# Patient Record
Sex: MALE | Race: WHITE | NOT HISPANIC OR LATINO | Employment: OTHER | URBAN - METROPOLITAN AREA
[De-identification: names, ages, dates, MRNs, and addresses within clinical notes are randomized per-mention and may not be internally consistent; named-entity substitution may affect disease eponyms.]

---

## 2018-05-17 ENCOUNTER — PATIENT OUTREACH (OUTPATIENT)
Dept: ADMINISTRATIVE | Facility: HOSPITAL | Age: 73
End: 2018-05-17

## 2018-05-17 NOTE — LETTER
May 17, 2018    Jose Charles  [address]  [place] [address]             [facility]  [address]  [facility] [address]  Phone: [phone] May 17, 2018      Dear Ms. Soto,    I see you have an upcoming appointment with Angela Tejada MD .      Your Primary Care Provider will review the need for any lab testing and obtain or schedule during your appointment.  If you have completed any labs prior to this appointment, please bring a copy with you or let me know where I may request the records.    Your chart is indicating you may be due for the following:   Health Maintenance Due   Topic    Hepatitis C Screening     Lipid Panel     Colonoscopy     Zoster Vaccine      Were any of these test/procedures performed outside of [facility]?  If so, please let me know when and where so I may request those records and update your chart.      If you would like me to coordinate scheduling any of the recommended test or procedures around the time of your appointment, please let me know.     Thank you for choosing [facility] for your healthcare needs,  Pily MARIE LPN, Care Coordination Department  [facility]

## 2018-05-31 ENCOUNTER — OFFICE VISIT (OUTPATIENT)
Dept: FAMILY MEDICINE | Facility: CLINIC | Age: 73
End: 2018-05-31
Payer: MEDICARE

## 2018-05-31 VITALS
HEIGHT: 70 IN | RESPIRATION RATE: 18 BRPM | DIASTOLIC BLOOD PRESSURE: 70 MMHG | OXYGEN SATURATION: 95 % | WEIGHT: 159.63 LBS | SYSTOLIC BLOOD PRESSURE: 124 MMHG | BODY MASS INDEX: 22.85 KG/M2 | TEMPERATURE: 98 F | HEART RATE: 54 BPM

## 2018-05-31 DIAGNOSIS — R32 URINARY INCONTINENCE, UNSPECIFIED TYPE: ICD-10-CM

## 2018-05-31 DIAGNOSIS — I77.9 CAROTID ARTERY DISEASE, UNSPECIFIED LATERALITY: ICD-10-CM

## 2018-05-31 DIAGNOSIS — G31.84 MILD COGNITIVE IMPAIRMENT: ICD-10-CM

## 2018-05-31 DIAGNOSIS — E78.5 HYPERLIPIDEMIA, UNSPECIFIED HYPERLIPIDEMIA TYPE: ICD-10-CM

## 2018-05-31 PROCEDURE — 99204 OFFICE O/P NEW MOD 45 MIN: CPT | Mod: S$GLB,,, | Performed by: FAMILY MEDICINE

## 2018-05-31 PROCEDURE — 99999 PR PBB SHADOW E&M-EST. PATIENT-LVL III: CPT | Mod: PBBFAC,,, | Performed by: FAMILY MEDICINE

## 2018-05-31 RX ORDER — ROSUVASTATIN CALCIUM 5 MG/1
TABLET, COATED ORAL DAILY
COMMUNITY
Start: 2018-04-13 | End: 2018-10-09 | Stop reason: SDUPTHER

## 2018-05-31 RX ORDER — CEFDINIR 300 MG/1
CAPSULE ORAL
COMMUNITY
Start: 2018-05-30 | End: 2019-06-06 | Stop reason: ALTCHOICE

## 2018-05-31 RX ORDER — EZETIMIBE 10 MG/1
TABLET ORAL DAILY
COMMUNITY
Start: 2018-04-13 | End: 2019-06-06 | Stop reason: ALTCHOICE

## 2018-05-31 NOTE — PROGRESS NOTES
CHIEF COMPLAINT: This is a 72-year-old male here for first visit to establish care and for follow-up lab work.    SUBJECTIVE: The patient's PCP retired and he is now seeking a new primary care doctor.  He has a history of hyperlipidemia for which he takes Zetia 10 mg daily.  Crestor was added in February 2018.  Patient is being followed by urologist for urinary leakage after urination and ED.  He is to have urological procedure in the next 1-2 weeks.  Patient reports mild memory loss.  He has tinnitus of both ears, especially at night and a past history of shingles.  The patient exercises regularly by running every 2-3 days.  He has participated in 2 half marathons this year.  Patient is a vegetarian but still eats animal products such as eggs and cheese.  Patient drives, cooks and does his own finances.  He lives with his ailing girlfriend whom he cares for.      Medical records indicate a history of arrhythmia and mild carotid artery disease.    Eye exam May 2018.  Colonoscopy September 2012.  Tdap December 2017.  Patient refuses immunizations.      Past Medical History:   Diagnosis Date    Carotid artery disease     Mild    Dyshidrotic eczema     ED (erectile dysfunction)     History of cardiac arrhythmia     Hyperlipidemia     Mild cognitive impairment     Shingles     Tinnitus of both ears     Urinary incontinence        Past Surgical History:   Procedure Laterality Date    CATARACT EXTRACTION W/  INTRAOCULAR LENS IMPLANT Bilateral 08/2017    EYE SURGERY Bilateral 05/2018    Laser treatment     INGUINAL HERNIA REPAIR      TONSILLECTOMY         Social History     Social History    Marital status: Single     Spouse name: N/A    Number of children: N/A    Years of education: N/A     Social History Main Topics    Smoking status: Never Smoker    Smokeless tobacco: Never Used    Alcohol use 3.6 - 4.8 oz/week     6 - 8 Glasses of wine per week    Drug use: No    Sexual activity: Not Asked      Other Topics Concern    None     Social History Narrative    The patient is  and is now living with his girlfriend who is in ill health.  He has 3 adult children.  He is a retired CPA.        Family History   Problem Relation Age of Onset    Colon cancer Mother     Heart disease Father     No Known Problems Sister     No Known Problems Sister          ROS:  GENERAL: Patient denies fever, chills, night sweats.  Patient denies weight gain or loss. Patient denies anorexia, fatigue, weakness or swollen glands.  SKIN: Patient denies rash or hair loss.  HEENT: Patient denies sore throat, ear pain, hearing loss, nasal congestion, or runny nose. Patient denies visual disturbance, eye irritation or discharge.  Positive tinnitus.  LUNGS: Patient denies cough, wheeze or hemoptysis.  CARDIOVASCULAR: Patient denies chest pain, shortness of breath, palpitations, syncope or lower extremity edema.  GI: Patient denies abdominal pain, nausea, vomiting, diarrhea, constipation, blood in stool or melena.  GENITOURINARY: Patient denies irregular vaginal bleeding.  Patient denies dysuria, frequency, hematuria, nocturia, or urgency.  Positive for incontinence and erectile dysfunction.  MUSCULOSKELETAL: Patient denies joint pain, swelling, redness or warmth.  NEUROLOGIC: Patient denies headache, vertigo, paresthesias, weakness in limb, dysarthria, dysphagia or abnormality of gait.  PSYCHIATRIC: Patient denies anxiety, depression, or memory loss.    OBJECTIVE:   GENERAL:  Well-developed well-nourished pleasant light male alert and oriented x3, in no acute distress.  Mild cognitive impairment.   SKIN: Clear without rash.  Normal color and tone.  HEENT: Eyes: Clear conjunctivae.  No scleral icterus.  Pupils equal reactive to light and accommodation.  Ears: Clear canals. Clear TMs.  Nose: Without congestion.  Pharynx: Without injection or exudates.  NECK: Supple, normal range of motion.  No masses, nodes or enlarged thyroid.   No JVD.  Carotids 2+ and equal.  No bruits.  LUNGS: Clear to auscultation.  Normal respiratory effort.  CARDIOVASCULAR: Regular rhythm, normal S1, S2 without murmur, gallop or rub.  BACK: No CVA or spinal tenderness.  ABDOMEN: Normal appearance.  Active bowel sounds.  Soft, nontender without mass or organomegaly.  No rebound or guarding.  EXTREMITIES: Without cyanosis, clubbing or edema.  Distal pulses 2+ and equal.  Normal range of motion in all extremities.  No joint effusion, erythema or warmth.  NEUROLOGIC:   Cranial nerves II through XII without deficit.  Motor strength equal bilaterally.  Sensation normal to touch.  Deep tendon reflexes 2+ and equal.  Gait without abnormality.  No tremor.  Negative cerebellar signs.      ASSESSMENT:  1. Hyperlipidemia, unspecified hyperlipidemia type    2. Urinary incontinence, unspecified type    3. Mild cognitive impairment    4. Carotid artery disease, unspecified laterality      PLAN:   1.  Exercise regularly.  2.  Age-appropriate counseling.  3.  Check lipid panel, SGOT and SGPT.  4.  Refill medications after results reviewed.  5.  Return to clinic for preventive health exam in February 2019.  6.  Patient refuses immunizations.    This visit was 45 minutes, greater than 50% of which involved counseling.

## 2018-06-01 ENCOUNTER — LAB VISIT (OUTPATIENT)
Dept: LAB | Facility: HOSPITAL | Age: 73
End: 2018-06-01
Attending: FAMILY MEDICINE
Payer: MEDICARE

## 2018-06-01 DIAGNOSIS — E78.5 HYPERLIPIDEMIA, UNSPECIFIED HYPERLIPIDEMIA TYPE: ICD-10-CM

## 2018-06-01 LAB
ALT SERPL W/O P-5'-P-CCNC: 30 U/L
AST SERPL-CCNC: 28 U/L
CHOLEST SERPL-MCNC: 164 MG/DL
CHOLEST/HDLC SERPL: 2 {RATIO}
HDLC SERPL-MCNC: 83 MG/DL
HDLC SERPL: 50.6 %
LDLC SERPL CALC-MCNC: 73.4 MG/DL
NONHDLC SERPL-MCNC: 81 MG/DL
TRIGL SERPL-MCNC: 38 MG/DL

## 2018-06-01 PROCEDURE — 80061 LIPID PANEL: CPT

## 2018-06-01 PROCEDURE — 36415 COLL VENOUS BLD VENIPUNCTURE: CPT | Mod: PO

## 2018-06-01 PROCEDURE — 84460 ALANINE AMINO (ALT) (SGPT): CPT

## 2018-06-01 PROCEDURE — 84450 TRANSFERASE (AST) (SGOT): CPT

## 2018-06-21 RX ORDER — ACETAMINOPHEN 500 MG
1 TABLET ORAL DAILY
COMMUNITY

## 2018-06-22 RX ORDER — LANOLIN ALCOHOL/MO/W.PET/CERES
100 CREAM (GRAM) TOPICAL DAILY
COMMUNITY

## 2018-06-22 RX ORDER — MAGNESIUM 250 MG
1 TABLET ORAL DAILY
COMMUNITY
End: 2019-06-06 | Stop reason: ALTCHOICE

## 2018-06-22 RX ORDER — GUAIFENESIN AND PHENYLEPHRINE HCL 400; 10 MG/1; MG/1
1 TABLET ORAL DAILY
COMMUNITY
End: 2019-06-06 | Stop reason: ALTCHOICE

## 2018-10-09 RX ORDER — ROSUVASTATIN CALCIUM 5 MG/1
5 TABLET, COATED ORAL DAILY
Qty: 90 TABLET | Refills: 1 | Status: SHIPPED | OUTPATIENT
Start: 2018-10-09 | End: 2019-06-13 | Stop reason: SDUPTHER

## 2019-06-06 ENCOUNTER — OFFICE VISIT (OUTPATIENT)
Dept: FAMILY MEDICINE | Facility: CLINIC | Age: 74
End: 2019-06-06
Payer: MEDICARE

## 2019-06-06 VITALS
WEIGHT: 174.63 LBS | BODY MASS INDEX: 25 KG/M2 | HEIGHT: 70 IN | TEMPERATURE: 99 F | SYSTOLIC BLOOD PRESSURE: 118 MMHG | OXYGEN SATURATION: 98 % | DIASTOLIC BLOOD PRESSURE: 86 MMHG | HEART RATE: 56 BPM

## 2019-06-06 DIAGNOSIS — E78.5 HYPERLIPIDEMIA, UNSPECIFIED HYPERLIPIDEMIA TYPE: ICD-10-CM

## 2019-06-06 DIAGNOSIS — Z12.5 PROSTATE CANCER SCREENING: ICD-10-CM

## 2019-06-06 DIAGNOSIS — Z00.00 PREVENTATIVE HEALTH CARE: Primary | ICD-10-CM

## 2019-06-06 DIAGNOSIS — Z23 NEED FOR PNEUMOCOCCAL VACCINATION: ICD-10-CM

## 2019-06-06 DIAGNOSIS — I77.9 CAROTID ARTERY DISEASE, UNSPECIFIED LATERALITY, UNSPECIFIED TYPE: ICD-10-CM

## 2019-06-06 PROCEDURE — 99397 PER PM REEVAL EST PAT 65+ YR: CPT | Mod: 25,S$GLB,, | Performed by: FAMILY MEDICINE

## 2019-06-06 PROCEDURE — 99397 PR PREVENTIVE VISIT,EST,65 & OVER: ICD-10-PCS | Mod: 25,S$GLB,, | Performed by: FAMILY MEDICINE

## 2019-06-06 PROCEDURE — 90670 PCV13 VACCINE IM: CPT | Mod: S$GLB,,, | Performed by: FAMILY MEDICINE

## 2019-06-06 PROCEDURE — 99999 PR PBB SHADOW E&M-EST. PATIENT-LVL III: CPT | Mod: PBBFAC,,, | Performed by: FAMILY MEDICINE

## 2019-06-06 PROCEDURE — 99999 PR PBB SHADOW E&M-EST. PATIENT-LVL III: ICD-10-PCS | Mod: PBBFAC,,, | Performed by: FAMILY MEDICINE

## 2019-06-06 PROCEDURE — G0009 ADMIN PNEUMOCOCCAL VACCINE: HCPCS | Mod: S$GLB,,, | Performed by: FAMILY MEDICINE

## 2019-06-06 PROCEDURE — G0009 PNEUMOCOCCAL CONJUGATE VACCINE 13-VALENT LESS THAN 5YO & GREATER THAN: ICD-10-PCS | Mod: S$GLB,,, | Performed by: FAMILY MEDICINE

## 2019-06-06 PROCEDURE — 90670 PNEUMOCOCCAL CONJUGATE VACCINE 13-VALENT LESS THAN 5YO & GREATER THAN: ICD-10-PCS | Mod: S$GLB,,, | Performed by: FAMILY MEDICINE

## 2019-06-06 RX ORDER — ASCORBIC ACID 500 MG
500 TABLET ORAL DAILY
COMMUNITY

## 2019-06-06 NOTE — PROGRESS NOTES
CHIEF COMPLAINT:  This is a 73-year-old male here for preventive health exam.      SUBJECTIVE:  Patient is doing well without complaints except for fractured 3rd metatarsal on right foot.  He is being followed by Orthopedics.  He has a history of hyperlipidemia for which he was taking Crestor 5 mg daily up until 1 month ago.  Patient is concerned about side effects from statins.  He has a history of mild carotid artery disease and arrhythmia.  Patient has a history of mild memory loss.  He has tinnitus of both ears, especially at night and a past history of shingles.  The patient exercises by walking since he fractured his foot.  He usually runs for exercise.  Patient is a vegetarian but still eats animal products such as eggs and cheese.  Patient drives, cooks and does his own finances.  He lives with his ailing girlfriend whom he cares for.      ROS:  GENERAL: Patient denies fever, chills, night sweats.  Patient denies weight gain or loss. Patient denies anorexia, fatigue, weakness or swollen glands.  SKIN: Patient denies rash or hair loss.  HEENT: Patient denies sore throat, ear pain, hearing loss, nasal congestion, or runny nose. Patient denies visual disturbance, eye irritation or discharge.  Positive tinnitus.  LUNGS: Patient denies cough, wheeze or hemoptysis.  CARDIOVASCULAR: Patient denies chest pain, shortness of breath, palpitations, syncope or lower extremity edema.  GI: Patient denies abdominal pain, nausea, vomiting, diarrhea, constipation, blood in stool or melena.  GENITOURINARY: Patient denies irregular vaginal bleeding.  Patient denies dysuria, frequency, hematuria, nocturia, or urgency.  Positive for incontinence and erectile dysfunction.  MUSCULOSKELETAL: Patient denies joint pain, swelling, redness or warmth.  NEUROLOGIC: Patient denies headache, vertigo, paresthesias, weakness in limb, dysarthria, dysphagia or abnormality of gait.  PSYCHIATRIC: Patient denies anxiety, depression, or memory  loss.     OBJECTIVE:   GENERAL:  Well-developed well-nourished pleasant white male alert and oriented x3, in no acute distress.  Mild cognitive impairment.  Weight gain of 15 lb in the last year.  SKIN: Clear without rash.  Normal color and tone.  HEENT: Eyes: Clear conjunctivae.  No scleral icterus.  Pupils equal reactive to light and accommodation.  Ears: Clear canals. Clear TMs.  Nose: Without congestion.  Pharynx: Without injection or exudates.  NECK: Supple, normal range of motion.  No masses, nodes or enlarged thyroid.  No JVD.  Carotids 2+ and equal.  No bruits.  LUNGS: Clear to auscultation.  Normal respiratory effort.  CARDIOVASCULAR: Regular rhythm, normal S1, S2 without murmur, gallop or rub.  BACK: No CVA or spinal tenderness.  ABDOMEN: Normal appearance.  Active bowel sounds.  Soft, nontender without mass or organomegaly.  No rebound or guarding.  EXTREMITIES: Without cyanosis, clubbing or edema.  Distal pulses 2+ and equal.  Normal range of motion in all extremities.  No joint effusion, erythema or warmth.  NEUROLOGIC:   Cranial nerves II through XII without deficit.  Motor strength equal bilaterally.  Sensation normal to touch.  Deep tendon reflexes 2+ and equal.  Gait without abnormality.  No tremor.  Negative cerebellar signs.  tejal masses, tenderness or swelling.  Negative hernia.  TAMERA: Normal prostate, smooth, nontender.  No masses.  Anorectal exam: No masses, nontender. Heme negative stool x2.    ASSESSMENT:  1. Preventative health care    2. Hyperlipidemia, unspecified hyperlipidemia type    3. Carotid artery disease, unspecified laterality, unspecified type    4. Need for pneumococcal vaccination    5. Prostate cancer screening      PLAN:   1.  Exercise regularly.  2.  Age-appropriate counseling.  3.  Fasting lab.  4.  Consider restarting Crestor after results reviewed.  5.  Return to clinic annually.  6.  Pneumovax.    This note is generated with speech recognition software and is subject to  transcription error and sound alike phrases that may be missed by proofreading.

## 2019-06-06 NOTE — PROGRESS NOTES
Patient tolerated injection well. Waited in the clinic for 15 minutes. No reactions reported./VLW   communication

## 2019-06-07 ENCOUNTER — LAB VISIT (OUTPATIENT)
Dept: LAB | Facility: HOSPITAL | Age: 74
End: 2019-06-07
Attending: FAMILY MEDICINE
Payer: MEDICARE

## 2019-06-07 DIAGNOSIS — Z00.00 PREVENTATIVE HEALTH CARE: ICD-10-CM

## 2019-06-07 DIAGNOSIS — E78.5 HYPERLIPIDEMIA, UNSPECIFIED HYPERLIPIDEMIA TYPE: ICD-10-CM

## 2019-06-07 DIAGNOSIS — Z12.5 PROSTATE CANCER SCREENING: ICD-10-CM

## 2019-06-07 LAB
BASOPHILS # BLD AUTO: 0.03 K/UL (ref 0–0.2)
BASOPHILS NFR BLD: 0.5 % (ref 0–1.9)
DIFFERENTIAL METHOD: ABNORMAL
EOSINOPHIL # BLD AUTO: 0.5 K/UL (ref 0–0.5)
EOSINOPHIL NFR BLD: 7.7 % (ref 0–8)
ERYTHROCYTE [DISTWIDTH] IN BLOOD BY AUTOMATED COUNT: 14.7 % (ref 11.5–14.5)
HCT VFR BLD AUTO: 50.2 % (ref 40–54)
HGB BLD-MCNC: 15.8 G/DL (ref 14–18)
IMM GRANULOCYTES # BLD AUTO: 0.03 K/UL (ref 0–0.04)
IMM GRANULOCYTES NFR BLD AUTO: 0.5 % (ref 0–0.5)
LYMPHOCYTES # BLD AUTO: 1.6 K/UL (ref 1–4.8)
LYMPHOCYTES NFR BLD: 25.3 % (ref 18–48)
MCH RBC QN AUTO: 28.7 PG (ref 27–31)
MCHC RBC AUTO-ENTMCNC: 31.5 G/DL (ref 32–36)
MCV RBC AUTO: 91 FL (ref 82–98)
MONOCYTES # BLD AUTO: 0.8 K/UL (ref 0.3–1)
MONOCYTES NFR BLD: 11.9 % (ref 4–15)
NEUTROPHILS # BLD AUTO: 3.5 K/UL (ref 1.8–7.7)
NEUTROPHILS NFR BLD: 54.1 % (ref 38–73)
NRBC BLD-RTO: 0 /100 WBC
PLATELET # BLD AUTO: 219 K/UL (ref 150–350)
PMV BLD AUTO: 10.5 FL (ref 9.2–12.9)
RBC # BLD AUTO: 5.51 M/UL (ref 4.6–6.2)
WBC # BLD AUTO: 6.37 K/UL (ref 3.9–12.7)

## 2019-06-07 PROCEDURE — 84153 ASSAY OF PSA TOTAL: CPT

## 2019-06-07 PROCEDURE — 85025 COMPLETE CBC W/AUTO DIFF WBC: CPT

## 2019-06-07 PROCEDURE — 36415 COLL VENOUS BLD VENIPUNCTURE: CPT | Mod: PO

## 2019-06-07 PROCEDURE — 80053 COMPREHEN METABOLIC PANEL: CPT

## 2019-06-07 PROCEDURE — 80061 LIPID PANEL: CPT

## 2019-06-07 PROCEDURE — 84443 ASSAY THYROID STIM HORMONE: CPT

## 2019-06-07 PROCEDURE — 86803 HEPATITIS C AB TEST: CPT

## 2019-06-08 LAB
ALBUMIN SERPL BCP-MCNC: 4 G/DL (ref 3.5–5.2)
ALP SERPL-CCNC: 62 U/L (ref 55–135)
ALT SERPL W/O P-5'-P-CCNC: 20 U/L (ref 10–44)
ANION GAP SERPL CALC-SCNC: 8 MMOL/L (ref 8–16)
AST SERPL-CCNC: 24 U/L (ref 10–40)
BILIRUB SERPL-MCNC: 1 MG/DL (ref 0.1–1)
BUN SERPL-MCNC: 9 MG/DL (ref 8–23)
CALCIUM SERPL-MCNC: 10.2 MG/DL (ref 8.7–10.5)
CHLORIDE SERPL-SCNC: 104 MMOL/L (ref 95–110)
CHOLEST SERPL-MCNC: 275 MG/DL (ref 120–199)
CHOLEST/HDLC SERPL: 3.6 {RATIO} (ref 2–5)
CO2 SERPL-SCNC: 28 MMOL/L (ref 23–29)
COMPLEXED PSA SERPL-MCNC: 2 NG/ML (ref 0–4)
CREAT SERPL-MCNC: 1 MG/DL (ref 0.5–1.4)
EST. GFR  (AFRICAN AMERICAN): >60 ML/MIN/1.73 M^2
EST. GFR  (NON AFRICAN AMERICAN): >60 ML/MIN/1.73 M^2
GLUCOSE SERPL-MCNC: 85 MG/DL (ref 70–110)
HDLC SERPL-MCNC: 77 MG/DL (ref 40–75)
HDLC SERPL: 28 % (ref 20–50)
LDLC SERPL CALC-MCNC: 182 MG/DL (ref 63–159)
NONHDLC SERPL-MCNC: 198 MG/DL
POTASSIUM SERPL-SCNC: 4.4 MMOL/L (ref 3.5–5.1)
PROT SERPL-MCNC: 7.1 G/DL (ref 6–8.4)
SODIUM SERPL-SCNC: 140 MMOL/L (ref 136–145)
TRIGL SERPL-MCNC: 80 MG/DL (ref 30–150)
TSH SERPL DL<=0.005 MIU/L-ACNC: 1.58 UIU/ML (ref 0.4–4)

## 2019-06-10 LAB — HCV AB SERPL QL IA: NEGATIVE

## 2019-06-13 RX ORDER — ROSUVASTATIN CALCIUM 5 MG/1
TABLET, COATED ORAL
Qty: 90 TABLET | Refills: 3 | Status: SHIPPED | OUTPATIENT
Start: 2019-06-13 | End: 2020-04-19

## 2019-06-14 ENCOUNTER — TELEPHONE (OUTPATIENT)
Dept: FAMILY MEDICINE | Facility: CLINIC | Age: 74
End: 2019-06-14

## 2019-06-14 NOTE — TELEPHONE ENCOUNTER
----- Message from Surekha Richey sent at 6/14/2019 11:59 AM CDT -----  Contact: self  needs call back regarding status of crestor script, [facility] was supposed to have sent...[phone] (home)

## 2020-04-19 RX ORDER — ROSUVASTATIN CALCIUM 5 MG/1
TABLET, COATED ORAL
Qty: 90 TABLET | Refills: 0 | Status: SHIPPED | OUTPATIENT
Start: 2020-04-19 | End: 2020-08-04 | Stop reason: SDUPTHER

## 2020-08-04 ENCOUNTER — OFFICE VISIT (OUTPATIENT)
Dept: FAMILY MEDICINE | Facility: CLINIC | Age: 75
End: 2020-08-04
Payer: MEDICARE

## 2020-08-04 VITALS
WEIGHT: 173.06 LBS | HEART RATE: 63 BPM | DIASTOLIC BLOOD PRESSURE: 74 MMHG | OXYGEN SATURATION: 96 % | HEIGHT: 70 IN | BODY MASS INDEX: 24.78 KG/M2 | SYSTOLIC BLOOD PRESSURE: 118 MMHG | TEMPERATURE: 99 F

## 2020-08-04 DIAGNOSIS — E78.5 HYPERLIPIDEMIA, UNSPECIFIED HYPERLIPIDEMIA TYPE: ICD-10-CM

## 2020-08-04 DIAGNOSIS — N52.9 ERECTILE DYSFUNCTION, UNSPECIFIED ERECTILE DYSFUNCTION TYPE: ICD-10-CM

## 2020-08-04 DIAGNOSIS — Z23 NEED FOR VACCINATION: ICD-10-CM

## 2020-08-04 DIAGNOSIS — Z00.00 PREVENTATIVE HEALTH CARE: Primary | ICD-10-CM

## 2020-08-04 DIAGNOSIS — I77.9 CAROTID ARTERY DISEASE, UNSPECIFIED LATERALITY, UNSPECIFIED TYPE: ICD-10-CM

## 2020-08-04 PROCEDURE — 99397 PR PREVENTIVE VISIT,EST,65 & OVER: ICD-10-PCS | Mod: 25,S$GLB,, | Performed by: FAMILY MEDICINE

## 2020-08-04 PROCEDURE — 99999 PR PBB SHADOW E&M-EST. PATIENT-LVL IV: ICD-10-PCS | Mod: PBBFAC,,, | Performed by: FAMILY MEDICINE

## 2020-08-04 PROCEDURE — 90732 PNEUMOCOCCAL POLYSACCHARIDE VACCINE 23-VALENT =>2YO SQ IM: ICD-10-PCS | Mod: S$GLB,,, | Performed by: FAMILY MEDICINE

## 2020-08-04 PROCEDURE — 90732 PPSV23 VACC 2 YRS+ SUBQ/IM: CPT | Mod: S$GLB,,, | Performed by: FAMILY MEDICINE

## 2020-08-04 PROCEDURE — G0009 PNEUMOCOCCAL POLYSACCHARIDE VACCINE 23-VALENT =>2YO SQ IM: ICD-10-PCS | Mod: S$GLB,,, | Performed by: FAMILY MEDICINE

## 2020-08-04 PROCEDURE — 99397 PER PM REEVAL EST PAT 65+ YR: CPT | Mod: 25,S$GLB,, | Performed by: FAMILY MEDICINE

## 2020-08-04 PROCEDURE — G0009 ADMIN PNEUMOCOCCAL VACCINE: HCPCS | Mod: S$GLB,,, | Performed by: FAMILY MEDICINE

## 2020-08-04 PROCEDURE — 99999 PR PBB SHADOW E&M-EST. PATIENT-LVL IV: CPT | Mod: PBBFAC,,, | Performed by: FAMILY MEDICINE

## 2020-08-04 RX ORDER — ROSUVASTATIN CALCIUM 5 MG/1
5 TABLET, COATED ORAL DAILY
Qty: 90 TABLET | Refills: 3 | Status: SHIPPED | OUTPATIENT
Start: 2020-08-04 | End: 2021-08-18

## 2020-08-04 RX ORDER — TADALAFIL 5 MG/1
5 TABLET ORAL DAILY PRN
COMMUNITY
End: 2022-04-14

## 2020-08-04 RX ORDER — ASPIRIN 81 MG/1
81 TABLET ORAL
COMMUNITY

## 2020-08-04 NOTE — PROGRESS NOTES
CHIEF COMPLAINT:  This is a 74-year-old male here for preventive health exam.       SUBJECTIVE:  Patient is doing well without complaints.  He has a history of hyperlipidemia for which he was taking Crestor 5 mg daily up until 1 month ago.  Patient is concerned about side effects from statins.  He has a history of mild carotid artery disease and arrhythmia. The patient sees his urologist twice yearly and recently received a prescription for Cialis 5 mg daily for ED.  He has tinnitus of both ears, especially at night and a past history of shingles.  The patient exercises by running every 2-3 days and doing yoga.  Patient is a vegetarian but still eats animal products such as eggs and cheese.  Patient drives, cooks and does his own finances.  He lives with his ailing girlfriend whom he cares for.      Eye exam 2020.  Colonoscopy September 2012 due in September 2022.  Tdap December 2017.  Prevnar June 2019.     ROS:  GENERAL: Patient denies fever, chills, night sweats.  Patient denies weight gain or loss. Patient denies anorexia, fatigue, weakness or swollen glands.  SKIN: Patient denies rash or hair loss.  HEENT: Patient denies sore throat, ear pain, hearing loss, nasal congestion, or runny nose. Patient denies visual disturbance, eye irritation or discharge.  Positive tinnitus.  LUNGS: Patient denies cough, wheeze or hemoptysis.  CARDIOVASCULAR: Patient denies chest pain, shortness of breath, palpitations, syncope or lower extremity edema.  GI: Patient denies abdominal pain, nausea, vomiting, diarrhea, constipation, blood in stool or melena.  GENITOURINARY: Patient denies irregular vaginal bleeding.  Patient denies dysuria, frequency, hematuria, nocturia, or urgency.  Positive for incontinence and erectile dysfunction.  MUSCULOSKELETAL: Patient denies joint pain, swelling, redness or warmth.  NEUROLOGIC: Patient denies headache, vertigo, paresthesias, weakness in limb, dysarthria, dysphagia or abnormality of  gait.  PSYCHIATRIC: Patient denies anxiety, depression, or memory loss.     OBJECTIVE:   GENERAL:  Well-developed well-nourished pleasant white male alert and oriented x3, in no acute distress.  Mild cognitive impairment.  Weight gain of 15 lb in the last year.  SKIN: Clear without rash.  Normal color and tone.  HEENT: Eyes: Clear conjunctivae.  No scleral icterus.  Pupils equal reactive to light and accommodation.  Ears: Clear canals. Clear TMs.  Nose: Without congestion.  Pharynx: Without injection or exudates.  NECK: Supple, normal range of motion.  No masses, nodes or enlarged thyroid.  No JVD.  Carotids 2+ and equal.  No bruits.  LUNGS: Clear to auscultation.  Normal respiratory effort.  CARDIOVASCULAR: Regular rhythm, normal S1, S2 without murmur, gallop or rub.  BACK: No CVA or spinal tenderness.  ABDOMEN: Normal appearance.  Active bowel sounds.  Soft, nontender without mass or organomegaly.  No rebound or guarding.  EXTREMITIES: Without cyanosis, clubbing or edema.  Distal pulses 2+ and equal.  Normal range of motion in all extremities.  No joint effusion, erythema or warmth.  NEUROLOGIC:   Cranial nerves II through XII without deficit.  Motor strength equal bilaterally.  Sensation normal to touch.  Deep tendon reflexes 2+ and equal.  Gait without abnormality.  No tremor.  Negative cerebellar signs.  tejal masses, tenderness or swelling.  Negative hernia.  TAMERA:  Deferred to urologist.     ASSESSMENT:  1. Preventative health care    2. Hyperlipidemia, unspecified hyperlipidemia type    3. Carotid artery disease, unspecified laterality, unspecified type    4. Erectile dysfunction, unspecified erectile dysfunction type    5. Need for vaccination        PLAN:  1.  Weight reduction. Exercise regularly.  2.  Age-appropriate counseling.  3.  Fasting lab.  4.  Refill Crestor 5 mg daily.  5.  Pneumovax.  6.  Follow up annually.    This note is generated with speech recognition software and is subject to transcription  error and sound alike phrases that may be missed by proofreading.

## 2020-08-05 ENCOUNTER — LAB VISIT (OUTPATIENT)
Dept: LAB | Facility: HOSPITAL | Age: 75
End: 2020-08-05
Attending: FAMILY MEDICINE
Payer: MEDICARE

## 2020-08-05 DIAGNOSIS — E78.5 HYPERLIPIDEMIA, UNSPECIFIED HYPERLIPIDEMIA TYPE: ICD-10-CM

## 2020-08-05 LAB
ALBUMIN SERPL BCP-MCNC: 3.9 G/DL (ref 3.5–5.2)
ALP SERPL-CCNC: 65 U/L (ref 55–135)
ALT SERPL W/O P-5'-P-CCNC: 27 U/L (ref 10–44)
ANION GAP SERPL CALC-SCNC: 8 MMOL/L (ref 8–16)
AST SERPL-CCNC: 30 U/L (ref 10–40)
BILIRUB SERPL-MCNC: 0.8 MG/DL (ref 0.1–1)
BUN SERPL-MCNC: 9 MG/DL (ref 8–23)
CALCIUM SERPL-MCNC: 9.4 MG/DL (ref 8.7–10.5)
CHLORIDE SERPL-SCNC: 104 MMOL/L (ref 95–110)
CHOLEST SERPL-MCNC: 182 MG/DL (ref 120–199)
CHOLEST/HDLC SERPL: 2.6 {RATIO} (ref 2–5)
CO2 SERPL-SCNC: 28 MMOL/L (ref 23–29)
CREAT SERPL-MCNC: 1 MG/DL (ref 0.5–1.4)
EST. GFR  (AFRICAN AMERICAN): >60 ML/MIN/1.73 M^2
EST. GFR  (NON AFRICAN AMERICAN): >60 ML/MIN/1.73 M^2
GLUCOSE SERPL-MCNC: 78 MG/DL (ref 70–110)
HDLC SERPL-MCNC: 69 MG/DL (ref 40–75)
HDLC SERPL: 37.9 % (ref 20–50)
LDLC SERPL CALC-MCNC: 99 MG/DL (ref 63–159)
NONHDLC SERPL-MCNC: 113 MG/DL
POTASSIUM SERPL-SCNC: 4.8 MMOL/L (ref 3.5–5.1)
PROT SERPL-MCNC: 7.3 G/DL (ref 6–8.4)
SODIUM SERPL-SCNC: 140 MMOL/L (ref 136–145)
TRIGL SERPL-MCNC: 70 MG/DL (ref 30–150)
TSH SERPL DL<=0.005 MIU/L-ACNC: 1.1 UIU/ML (ref 0.4–4)

## 2020-08-05 PROCEDURE — 85025 COMPLETE CBC W/AUTO DIFF WBC: CPT

## 2020-08-05 PROCEDURE — 80053 COMPREHEN METABOLIC PANEL: CPT

## 2020-08-05 PROCEDURE — 36415 COLL VENOUS BLD VENIPUNCTURE: CPT | Mod: PO

## 2020-08-05 PROCEDURE — 80061 LIPID PANEL: CPT

## 2020-08-05 PROCEDURE — 84443 ASSAY THYROID STIM HORMONE: CPT

## 2020-08-06 LAB
BASOPHILS # BLD AUTO: 0.03 K/UL (ref 0–0.2)
BASOPHILS NFR BLD: 0.4 % (ref 0–1.9)
DIFFERENTIAL METHOD: ABNORMAL
EOSINOPHIL # BLD AUTO: 0.3 K/UL (ref 0–0.5)
EOSINOPHIL NFR BLD: 3.8 % (ref 0–8)
ERYTHROCYTE [DISTWIDTH] IN BLOOD BY AUTOMATED COUNT: 14.3 % (ref 11.5–14.5)
HCT VFR BLD AUTO: 48.6 % (ref 40–54)
HGB BLD-MCNC: 15.2 G/DL (ref 14–18)
IMM GRANULOCYTES # BLD AUTO: 0.02 K/UL (ref 0–0.04)
IMM GRANULOCYTES NFR BLD AUTO: 0.2 % (ref 0–0.5)
LYMPHOCYTES # BLD AUTO: 2 K/UL (ref 1–4.8)
LYMPHOCYTES NFR BLD: 24.3 % (ref 18–48)
MCH RBC QN AUTO: 28.8 PG (ref 27–31)
MCHC RBC AUTO-ENTMCNC: 31.3 G/DL (ref 32–36)
MCV RBC AUTO: 92 FL (ref 82–98)
MONOCYTES # BLD AUTO: 0.8 K/UL (ref 0.3–1)
MONOCYTES NFR BLD: 10.4 % (ref 4–15)
NEUTROPHILS # BLD AUTO: 4.9 K/UL (ref 1.8–7.7)
NEUTROPHILS NFR BLD: 60.9 % (ref 38–73)
NRBC BLD-RTO: 0 /100 WBC
PLATELET # BLD AUTO: 250 K/UL (ref 150–350)
PMV BLD AUTO: 10.6 FL (ref 9.2–12.9)
RBC # BLD AUTO: 5.28 M/UL (ref 4.6–6.2)
WBC # BLD AUTO: 8.08 K/UL (ref 3.9–12.7)

## 2020-08-07 ENCOUNTER — TELEPHONE (OUTPATIENT)
Dept: FAMILY MEDICINE | Facility: CLINIC | Age: 75
End: 2020-08-07

## 2020-08-07 NOTE — TELEPHONE ENCOUNTER
----- Message from Sohail Fabian sent at 8/7/2020  1:38 PM CDT -----  Regarding: self  Type: Patient Call Back    Who called: Self    What is the request in detail: please contact the patient about the clinic staff    Can the clinic reply by [facility]? no    Would the patient rather a call back or a response via [facility]? call    Best call back number: [phone]

## 2020-08-07 NOTE — TELEPHONE ENCOUNTER
Spoke with pt, let him know the staff members name as requested. Pt wanting to send a thank you letter for them.

## 2021-03-05 ENCOUNTER — TELEPHONE (OUTPATIENT)
Dept: FAMILY MEDICINE | Facility: CLINIC | Age: 76
End: 2021-03-05

## 2021-05-26 RX ORDER — TRAZODONE HYDROCHLORIDE 100 MG/1
100 TABLET ORAL NIGHTLY
Qty: 30 TABLET | Refills: 2 | Status: SHIPPED | OUTPATIENT
Start: 2021-05-26 | End: 2021-07-02

## 2021-08-18 RX ORDER — ROSUVASTATIN CALCIUM 5 MG/1
5 TABLET, COATED ORAL DAILY
Qty: 90 TABLET | Refills: 0 | Status: SHIPPED | OUTPATIENT
Start: 2021-08-18 | End: 2021-10-25

## 2021-08-27 RX ORDER — TRAZODONE HYDROCHLORIDE 100 MG/1
100 TABLET ORAL NIGHTLY
Qty: 90 TABLET | Refills: 0 | Status: SHIPPED | OUTPATIENT
Start: 2021-08-27 | End: 2021-11-05

## 2021-09-09 ENCOUNTER — OFFICE VISIT (OUTPATIENT)
Dept: FAMILY MEDICINE | Facility: CLINIC | Age: 76
End: 2021-09-09
Payer: MEDICARE

## 2021-09-09 ENCOUNTER — LAB VISIT (OUTPATIENT)
Dept: LAB | Facility: HOSPITAL | Age: 76
End: 2021-09-09
Payer: MEDICARE

## 2021-09-09 VITALS
HEIGHT: 70 IN | DIASTOLIC BLOOD PRESSURE: 76 MMHG | TEMPERATURE: 98 F | BODY MASS INDEX: 23.68 KG/M2 | RESPIRATION RATE: 18 BRPM | SYSTOLIC BLOOD PRESSURE: 139 MMHG | HEART RATE: 60 BPM | OXYGEN SATURATION: 98 % | WEIGHT: 165.38 LBS

## 2021-09-09 DIAGNOSIS — Z00.00 PREVENTATIVE HEALTH CARE: ICD-10-CM

## 2021-09-09 DIAGNOSIS — E78.5 HYPERLIPIDEMIA, UNSPECIFIED HYPERLIPIDEMIA TYPE: ICD-10-CM

## 2021-09-09 DIAGNOSIS — I77.9 CAROTID ARTERY DISEASE, UNSPECIFIED LATERALITY, UNSPECIFIED TYPE: ICD-10-CM

## 2021-09-09 DIAGNOSIS — R32 URINARY INCONTINENCE, UNSPECIFIED TYPE: ICD-10-CM

## 2021-09-09 LAB
ALBUMIN SERPL BCP-MCNC: 4 G/DL (ref 3.5–5.2)
ALP SERPL-CCNC: 67 U/L (ref 55–135)
ALT SERPL W/O P-5'-P-CCNC: 32 U/L (ref 10–44)
ANION GAP SERPL CALC-SCNC: 11 MMOL/L (ref 8–16)
AST SERPL-CCNC: 29 U/L (ref 10–40)
BASOPHILS # BLD AUTO: 0.02 K/UL (ref 0–0.2)
BASOPHILS NFR BLD: 0.3 % (ref 0–1.9)
BILIRUB SERPL-MCNC: 1.2 MG/DL (ref 0.1–1)
BUN SERPL-MCNC: 13 MG/DL (ref 8–23)
CALCIUM SERPL-MCNC: 9.7 MG/DL (ref 8.7–10.5)
CHLORIDE SERPL-SCNC: 102 MMOL/L (ref 95–110)
CHOLEST SERPL-MCNC: 198 MG/DL (ref 120–199)
CHOLEST/HDLC SERPL: 2.5 {RATIO} (ref 2–5)
CO2 SERPL-SCNC: 26 MMOL/L (ref 23–29)
CREAT SERPL-MCNC: 0.9 MG/DL (ref 0.5–1.4)
DIFFERENTIAL METHOD: ABNORMAL
EOSINOPHIL # BLD AUTO: 0.3 K/UL (ref 0–0.5)
EOSINOPHIL NFR BLD: 4.6 % (ref 0–8)
ERYTHROCYTE [DISTWIDTH] IN BLOOD BY AUTOMATED COUNT: 15.9 % (ref 11.5–14.5)
EST. GFR  (AFRICAN AMERICAN): >60 ML/MIN/1.73 M^2
EST. GFR  (NON AFRICAN AMERICAN): >60 ML/MIN/1.73 M^2
GLUCOSE SERPL-MCNC: 84 MG/DL (ref 70–110)
HCT VFR BLD AUTO: 43 % (ref 40–54)
HDLC SERPL-MCNC: 80 MG/DL (ref 40–75)
HDLC SERPL: 40.4 % (ref 20–50)
HGB BLD-MCNC: 13.4 G/DL (ref 14–18)
IMM GRANULOCYTES # BLD AUTO: 0.01 K/UL (ref 0–0.04)
IMM GRANULOCYTES NFR BLD AUTO: 0.1 % (ref 0–0.5)
LDLC SERPL CALC-MCNC: 106.6 MG/DL (ref 63–159)
LYMPHOCYTES # BLD AUTO: 2.3 K/UL (ref 1–4.8)
LYMPHOCYTES NFR BLD: 34.3 % (ref 18–48)
MCH RBC QN AUTO: 26.2 PG (ref 27–31)
MCHC RBC AUTO-ENTMCNC: 31.2 G/DL (ref 32–36)
MCV RBC AUTO: 84 FL (ref 82–98)
MONOCYTES # BLD AUTO: 0.7 K/UL (ref 0.3–1)
MONOCYTES NFR BLD: 9.9 % (ref 4–15)
NEUTROPHILS # BLD AUTO: 3.4 K/UL (ref 1.8–7.7)
NEUTROPHILS NFR BLD: 50.8 % (ref 38–73)
NONHDLC SERPL-MCNC: 118 MG/DL
NRBC BLD-RTO: 0 /100 WBC
PLATELET # BLD AUTO: 239 K/UL (ref 150–450)
PMV BLD AUTO: 10.7 FL (ref 9.2–12.9)
POTASSIUM SERPL-SCNC: 4 MMOL/L (ref 3.5–5.1)
PROT SERPL-MCNC: 7.1 G/DL (ref 6–8.4)
RBC # BLD AUTO: 5.11 M/UL (ref 4.6–6.2)
SODIUM SERPL-SCNC: 139 MMOL/L (ref 136–145)
TRIGL SERPL-MCNC: 57 MG/DL (ref 30–150)
WBC # BLD AUTO: 6.68 K/UL (ref 3.9–12.7)

## 2021-09-09 PROCEDURE — 1160F RVW MEDS BY RX/DR IN RCRD: CPT | Mod: CPTII,S$GLB,, | Performed by: FAMILY MEDICINE

## 2021-09-09 PROCEDURE — 1159F PR MEDICATION LIST DOCUMENTED IN MEDICAL RECORD: ICD-10-PCS | Mod: CPTII,S$GLB,, | Performed by: FAMILY MEDICINE

## 2021-09-09 PROCEDURE — 3288F PR FALLS RISK ASSESSMENT DOCUMENTED: ICD-10-PCS | Mod: CPTII,S$GLB,, | Performed by: FAMILY MEDICINE

## 2021-09-09 PROCEDURE — 3075F SYST BP GE 130 - 139MM HG: CPT | Mod: CPTII,S$GLB,, | Performed by: FAMILY MEDICINE

## 2021-09-09 PROCEDURE — 1126F PR PAIN SEVERITY QUANTIFIED, NO PAIN PRESENT: ICD-10-PCS | Mod: CPTII,S$GLB,, | Performed by: FAMILY MEDICINE

## 2021-09-09 PROCEDURE — 1101F PR PT FALLS ASSESS DOC 0-1 FALLS W/OUT INJ PAST YR: ICD-10-PCS | Mod: CPTII,S$GLB,, | Performed by: FAMILY MEDICINE

## 2021-09-09 PROCEDURE — 1101F PT FALLS ASSESS-DOCD LE1/YR: CPT | Mod: CPTII,S$GLB,, | Performed by: FAMILY MEDICINE

## 2021-09-09 PROCEDURE — 3078F DIAST BP <80 MM HG: CPT | Mod: CPTII,S$GLB,, | Performed by: FAMILY MEDICINE

## 2021-09-09 PROCEDURE — 85025 COMPLETE CBC W/AUTO DIFF WBC: CPT | Performed by: FAMILY MEDICINE

## 2021-09-09 PROCEDURE — 99397 PR PREVENTIVE VISIT,EST,65 & OVER: ICD-10-PCS | Mod: S$GLB,,, | Performed by: FAMILY MEDICINE

## 2021-09-09 PROCEDURE — 80053 COMPREHEN METABOLIC PANEL: CPT | Performed by: FAMILY MEDICINE

## 2021-09-09 PROCEDURE — 1126F AMNT PAIN NOTED NONE PRSNT: CPT | Mod: CPTII,S$GLB,, | Performed by: FAMILY MEDICINE

## 2021-09-09 PROCEDURE — 36415 COLL VENOUS BLD VENIPUNCTURE: CPT | Mod: PO | Performed by: FAMILY MEDICINE

## 2021-09-09 PROCEDURE — 3008F BODY MASS INDEX DOCD: CPT | Mod: CPTII,S$GLB,, | Performed by: FAMILY MEDICINE

## 2021-09-09 PROCEDURE — 99397 PER PM REEVAL EST PAT 65+ YR: CPT | Mod: S$GLB,,, | Performed by: FAMILY MEDICINE

## 2021-09-09 PROCEDURE — 3075F PR MOST RECENT SYSTOLIC BLOOD PRESS GE 130-139MM HG: ICD-10-PCS | Mod: CPTII,S$GLB,, | Performed by: FAMILY MEDICINE

## 2021-09-09 PROCEDURE — 80061 LIPID PANEL: CPT | Performed by: FAMILY MEDICINE

## 2021-09-09 PROCEDURE — 1159F MED LIST DOCD IN RCRD: CPT | Mod: CPTII,S$GLB,, | Performed by: FAMILY MEDICINE

## 2021-09-09 PROCEDURE — 99999 PR PBB SHADOW E&M-EST. PATIENT-LVL III: ICD-10-PCS | Mod: PBBFAC,,, | Performed by: FAMILY MEDICINE

## 2021-09-09 PROCEDURE — 3288F FALL RISK ASSESSMENT DOCD: CPT | Mod: CPTII,S$GLB,, | Performed by: FAMILY MEDICINE

## 2021-09-09 PROCEDURE — 99999 PR PBB SHADOW E&M-EST. PATIENT-LVL III: CPT | Mod: PBBFAC,,, | Performed by: FAMILY MEDICINE

## 2021-09-09 PROCEDURE — 3078F PR MOST RECENT DIASTOLIC BLOOD PRESSURE < 80 MM HG: ICD-10-PCS | Mod: CPTII,S$GLB,, | Performed by: FAMILY MEDICINE

## 2021-09-09 PROCEDURE — 1160F PR REVIEW ALL MEDS BY PRESCRIBER/CLIN PHARMACIST DOCUMENTED: ICD-10-PCS | Mod: CPTII,S$GLB,, | Performed by: FAMILY MEDICINE

## 2021-09-09 PROCEDURE — 84443 ASSAY THYROID STIM HORMONE: CPT | Performed by: FAMILY MEDICINE

## 2021-09-09 PROCEDURE — 3008F PR BODY MASS INDEX (BMI) DOCUMENTED: ICD-10-PCS | Mod: CPTII,S$GLB,, | Performed by: FAMILY MEDICINE

## 2021-09-10 ENCOUNTER — TELEPHONE (OUTPATIENT)
Dept: FAMILY MEDICINE | Facility: CLINIC | Age: 76
End: 2021-09-10

## 2021-09-10 LAB — TSH SERPL DL<=0.005 MIU/L-ACNC: 1.18 UIU/ML (ref 0.4–4)

## 2021-10-25 RX ORDER — ROSUVASTATIN CALCIUM 5 MG/1
TABLET, COATED ORAL
Qty: 90 TABLET | Refills: 3 | Status: SHIPPED | OUTPATIENT
Start: 2021-10-25 | End: 2022-11-15

## 2021-11-05 RX ORDER — TRAZODONE HYDROCHLORIDE 100 MG/1
100 TABLET ORAL NIGHTLY
Qty: 90 TABLET | Refills: 3 | Status: SHIPPED | OUTPATIENT
Start: 2021-11-05 | End: 2022-09-01

## 2022-03-14 ENCOUNTER — NURSE TRIAGE (OUTPATIENT)
Dept: ADMINISTRATIVE | Facility: CLINIC | Age: 77
End: 2022-03-14
Payer: MEDICARE

## 2022-03-14 NOTE — TELEPHONE ENCOUNTER
Reason for Disposition   Neck pain persists > 2 weeks    Additional Information   Negative: Shock suspected (e.g., cold/pale/clammy skin, too weak to stand, low BP, rapid pulse)   Negative: Similar pain previously and it was from 'heart attack'   Negative: Similar pain previously from 'angina' and not relieved by nitroglycerin   Negative: Difficult to awaken or acting confused (e.g., disoriented, slurred speech)   Negative: Sounds like a life-threatening emergency to the triager   Negative: Followed an injury to neck (e.g., MVA, sports, impact or collision)   Negative: Chest pain   Negative: Lymph node in the neck is swollen or painful to the touch   Negative: Sore throat is main symptom   Negative: Difficulty breathing or unusual sweating (e.g., sweating without exertion)   Negative: Chest pain lasting longer than 5 minutes   Negative: Stiff neck (can't touch chin to chest) and has headache   Negative: Stiff neck (can't touch chin to chest) and fever   Negative: Weakness of an arm or hand   Negative: Problems with bowel or bladder control   Negative: Patient sounds very sick or weak to the triager   Negative: SEVERE pain (e.g., excruciating, unable to do any normal activities)   Negative: Head is twisting to one side (or ask 'is it turning against your will?')   Negative: Fever > 103 F (39.4 C)   Negative: Fever > 100.0 F (37.8 C) and Intravenous Drug Use (IVDU)   Negative: Fever > 100.0 F (37.8 C) and has diabetes mellitus or a weak immune system (e.g., HIV positive, cancer chemotherapy, organ transplant, splenectomy, chronic steroids)   Negative: Numbness in an arm or hand (i.e., loss of sensation)   Negative: Painful rash with multiple small blisters grouped together (i.e., dermatomal distribution or 'band' or 'stripe')   Negative: High-risk adult (e.g., history of cancer, HIV, or IV Drug Use)   Negative: Patient wants to be seen   Negative: Tenderness in front of neck over  windpipe   Negative: MODERATE neck pain (e.g., interferes with normal activities like work or school)   Negative: Pain shoots (radiates) into arm or hand    Protocols used: NECK PAIN OR [id number]    Pt stated his BP is 136/72. Stated he's been having discomfort in his left neck for a few weeks. Denies any other symptoms and stated he runs for an hour three times a week.  Advised to see a MD within 3 days. Unable to schedule with PCP in triage timeframe. Warm transferred to [place] in scheduling. Advised to call [facility] back for worsening symptoms. Pt verbalized understanding.

## 2022-03-15 ENCOUNTER — OFFICE VISIT (OUTPATIENT)
Dept: INTERNAL MEDICINE | Facility: CLINIC | Age: 77
End: 2022-03-15
Payer: MEDICARE

## 2022-03-15 VITALS
HEIGHT: 70 IN | SYSTOLIC BLOOD PRESSURE: 130 MMHG | DIASTOLIC BLOOD PRESSURE: 78 MMHG | WEIGHT: 170.19 LBS | OXYGEN SATURATION: 98 % | HEART RATE: 60 BPM | RESPIRATION RATE: 18 BRPM | TEMPERATURE: 98 F | BODY MASS INDEX: 24.37 KG/M2

## 2022-03-15 DIAGNOSIS — I77.9 CAROTID ARTERY DISEASE, UNSPECIFIED LATERALITY, UNSPECIFIED TYPE: ICD-10-CM

## 2022-03-15 DIAGNOSIS — M54.2 ANTERIOR NECK PAIN: Primary | ICD-10-CM

## 2022-03-15 PROBLEM — R41.3 MEMORY CHANGES: Status: ACTIVE | Noted: 2021-04-12

## 2022-03-15 PROCEDURE — 1160F PR REVIEW ALL MEDS BY PRESCRIBER/CLIN PHARMACIST DOCUMENTED: ICD-10-PCS | Mod: CPTII,S$GLB,, | Performed by: PHYSICIAN ASSISTANT

## 2022-03-15 PROCEDURE — 3288F FALL RISK ASSESSMENT DOCD: CPT | Mod: CPTII,S$GLB,, | Performed by: PHYSICIAN ASSISTANT

## 2022-03-15 PROCEDURE — 99999 PR PBB SHADOW E&M-EST. PATIENT-LVL V: CPT | Mod: PBBFAC,,, | Performed by: PHYSICIAN ASSISTANT

## 2022-03-15 PROCEDURE — 1126F PR PAIN SEVERITY QUANTIFIED, NO PAIN PRESENT: ICD-10-PCS | Mod: CPTII,S$GLB,, | Performed by: PHYSICIAN ASSISTANT

## 2022-03-15 PROCEDURE — 3075F PR MOST RECENT SYSTOLIC BLOOD PRESS GE 130-139MM HG: ICD-10-PCS | Mod: CPTII,S$GLB,, | Performed by: PHYSICIAN ASSISTANT

## 2022-03-15 PROCEDURE — 1160F RVW MEDS BY RX/DR IN RCRD: CPT | Mod: CPTII,S$GLB,, | Performed by: PHYSICIAN ASSISTANT

## 2022-03-15 PROCEDURE — 3288F PR FALLS RISK ASSESSMENT DOCUMENTED: ICD-10-PCS | Mod: CPTII,S$GLB,, | Performed by: PHYSICIAN ASSISTANT

## 2022-03-15 PROCEDURE — 99999 PR PBB SHADOW E&M-EST. PATIENT-LVL V: ICD-10-PCS | Mod: PBBFAC,,, | Performed by: PHYSICIAN ASSISTANT

## 2022-03-15 PROCEDURE — 3075F SYST BP GE 130 - 139MM HG: CPT | Mod: CPTII,S$GLB,, | Performed by: PHYSICIAN ASSISTANT

## 2022-03-15 PROCEDURE — 1159F PR MEDICATION LIST DOCUMENTED IN MEDICAL RECORD: ICD-10-PCS | Mod: CPTII,S$GLB,, | Performed by: PHYSICIAN ASSISTANT

## 2022-03-15 PROCEDURE — 1101F PT FALLS ASSESS-DOCD LE1/YR: CPT | Mod: CPTII,S$GLB,, | Performed by: PHYSICIAN ASSISTANT

## 2022-03-15 PROCEDURE — 3078F DIAST BP <80 MM HG: CPT | Mod: CPTII,S$GLB,, | Performed by: PHYSICIAN ASSISTANT

## 2022-03-15 PROCEDURE — 99499 UNLISTED E&M SERVICE: CPT | Mod: S$GLB,,, | Performed by: PHYSICIAN ASSISTANT

## 2022-03-15 PROCEDURE — 1126F AMNT PAIN NOTED NONE PRSNT: CPT | Mod: CPTII,S$GLB,, | Performed by: PHYSICIAN ASSISTANT

## 2022-03-15 PROCEDURE — 99499 RISK ADDL DX/OHS AUDIT: ICD-10-PCS | Mod: S$GLB,,, | Performed by: PHYSICIAN ASSISTANT

## 2022-03-15 PROCEDURE — 1101F PR PT FALLS ASSESS DOC 0-1 FALLS W/OUT INJ PAST YR: ICD-10-PCS | Mod: CPTII,S$GLB,, | Performed by: PHYSICIAN ASSISTANT

## 2022-03-15 PROCEDURE — 99213 OFFICE O/P EST LOW 20 MIN: CPT | Mod: S$GLB,,, | Performed by: PHYSICIAN ASSISTANT

## 2022-03-15 PROCEDURE — 99213 PR OFFICE/OUTPT VISIT, EST, LEVL III, 20-29 MIN: ICD-10-PCS | Mod: S$GLB,,, | Performed by: PHYSICIAN ASSISTANT

## 2022-03-15 PROCEDURE — 3078F PR MOST RECENT DIASTOLIC BLOOD PRESSURE < 80 MM HG: ICD-10-PCS | Mod: CPTII,S$GLB,, | Performed by: PHYSICIAN ASSISTANT

## 2022-03-15 PROCEDURE — 1159F MED LIST DOCD IN RCRD: CPT | Mod: CPTII,S$GLB,, | Performed by: PHYSICIAN ASSISTANT

## 2022-03-15 NOTE — PROGRESS NOTES
Subjective:       Patient ID: Jose Soto Jr. is a 76 y.o. male.    Chief Complaint: Follow-up    Patient Care Team:  Angela Tejada MD as PCP - General (Family Medicine)  Vikram Cerda MD as Consulting Physician (Urology)  Bryan Valdez MD as Consulting Physician (Ophthalmology)  Allan Sanchez MD as Consulting Physician (Neurology)  Callum Ewing LPN as Care Coordinator (Internal Medicine)    PT new to me  Jose Soto Jr. is a 76 y.o. male who presents today with complaints of left anterolateral neck discomfort that he feels most when exercising for over the last year. He denies any swelling to area, CP, difficulty breathing or swallowing. Does report history of grinding teeth while sleep and TMJ. He denies popping of his jaw or decreased ROM. He denies any posterior neck pain or radiation of pain to arm or paresthesias.     Review of Systems   Constitutional: Negative for chills, fatigue, fever and unexpected weight change.   Respiratory: Negative for shortness of breath.    Cardiovascular: Negative for chest pain.   Musculoskeletal: Positive for myalgias. Negative for neck stiffness.   Neurological: Negative for weakness, numbness and headaches.       Objective:      Physical Exam  Vitals and nursing note reviewed.   Constitutional:       General: He is not in acute distress.     Appearance: He is well-developed.   HENT:      Head: Normocephalic and atraumatic.      Jaw: No trismus, tenderness, swelling or pain on movement.      Comments: Left TMJ popping with movements, no evidence of dislocation  Eyes:      General: Lids are normal. No scleral icterus.     Extraocular Movements: Extraocular movements intact.      Conjunctiva/sclera: Conjunctivae normal.   Neck:      Vascular: Normal carotid pulses. No carotid bruit or JVD.     Cardiovascular:      Rate and Rhythm: Normal rate and regular rhythm.      Heart sounds: No murmur heard.    No friction rub. No gallop.   Pulmonary:      Effort:  Pulmonary effort is normal.      Breath sounds: Normal breath sounds. No decreased breath sounds, wheezing, rhonchi or rales.   Neurological:      Mental Status: He is alert.      Cranial Nerves: No cranial nerve deficit.      Gait: Gait normal.   Psychiatric:         Mood and Affect: Mood and affect normal.         Assessment:       1. Anterior neck pain    2. Carotid artery disease, unspecified laterality, unspecified type        Plan:     Problem List Items Addressed This Visit        Cardiac/Vascular    Carotid artery disease    Overview     Mild             Other Visit Diagnoses     Anterior neck pain    -  Primary          Recommended wearing  during sleep for 2 weeks, TMJ precautions, Tylenol as needed and care with upper body exercises, I.e. relaxing neck with lifting

## 2022-04-14 ENCOUNTER — OFFICE VISIT (OUTPATIENT)
Dept: FAMILY MEDICINE | Facility: CLINIC | Age: 77
End: 2022-04-14
Payer: MEDICARE

## 2022-04-14 VITALS
OXYGEN SATURATION: 99 % | HEIGHT: 70 IN | WEIGHT: 174.38 LBS | TEMPERATURE: 98 F | BODY MASS INDEX: 24.97 KG/M2 | SYSTOLIC BLOOD PRESSURE: 122 MMHG | DIASTOLIC BLOOD PRESSURE: 77 MMHG | HEART RATE: 57 BPM

## 2022-04-14 DIAGNOSIS — M72.2 PLANTAR FASCIITIS OF LEFT FOOT: ICD-10-CM

## 2022-04-14 DIAGNOSIS — M77.01 MEDIAL EPICONDYLITIS OF RIGHT ELBOW: Primary | ICD-10-CM

## 2022-04-14 DIAGNOSIS — I77.9 CAROTID ARTERY DISEASE, UNSPECIFIED LATERALITY, UNSPECIFIED TYPE: ICD-10-CM

## 2022-04-14 DIAGNOSIS — E78.5 HYPERLIPIDEMIA, UNSPECIFIED HYPERLIPIDEMIA TYPE: ICD-10-CM

## 2022-04-14 PROCEDURE — 1160F PR REVIEW ALL MEDS BY PRESCRIBER/CLIN PHARMACIST DOCUMENTED: ICD-10-PCS | Mod: CPTII,S$GLB,, | Performed by: FAMILY MEDICINE

## 2022-04-14 PROCEDURE — 99214 OFFICE O/P EST MOD 30 MIN: CPT | Mod: S$GLB,,, | Performed by: FAMILY MEDICINE

## 2022-04-14 PROCEDURE — 3078F PR MOST RECENT DIASTOLIC BLOOD PRESSURE < 80 MM HG: ICD-10-PCS | Mod: CPTII,S$GLB,, | Performed by: FAMILY MEDICINE

## 2022-04-14 PROCEDURE — 1101F PR PT FALLS ASSESS DOC 0-1 FALLS W/OUT INJ PAST YR: ICD-10-PCS | Mod: CPTII,S$GLB,, | Performed by: FAMILY MEDICINE

## 2022-04-14 PROCEDURE — 3078F DIAST BP <80 MM HG: CPT | Mod: CPTII,S$GLB,, | Performed by: FAMILY MEDICINE

## 2022-04-14 PROCEDURE — 3288F PR FALLS RISK ASSESSMENT DOCUMENTED: ICD-10-PCS | Mod: CPTII,S$GLB,, | Performed by: FAMILY MEDICINE

## 2022-04-14 PROCEDURE — 99999 PR PBB SHADOW E&M-EST. PATIENT-LVL III: CPT | Mod: PBBFAC,,, | Performed by: FAMILY MEDICINE

## 2022-04-14 PROCEDURE — 1126F AMNT PAIN NOTED NONE PRSNT: CPT | Mod: CPTII,S$GLB,, | Performed by: FAMILY MEDICINE

## 2022-04-14 PROCEDURE — 3288F FALL RISK ASSESSMENT DOCD: CPT | Mod: CPTII,S$GLB,, | Performed by: FAMILY MEDICINE

## 2022-04-14 PROCEDURE — 1101F PT FALLS ASSESS-DOCD LE1/YR: CPT | Mod: CPTII,S$GLB,, | Performed by: FAMILY MEDICINE

## 2022-04-14 PROCEDURE — 3074F SYST BP LT 130 MM HG: CPT | Mod: CPTII,S$GLB,, | Performed by: FAMILY MEDICINE

## 2022-04-14 PROCEDURE — 1159F MED LIST DOCD IN RCRD: CPT | Mod: CPTII,S$GLB,, | Performed by: FAMILY MEDICINE

## 2022-04-14 PROCEDURE — 99214 PR OFFICE/OUTPT VISIT, EST, LEVL IV, 30-39 MIN: ICD-10-PCS | Mod: S$GLB,,, | Performed by: FAMILY MEDICINE

## 2022-04-14 PROCEDURE — 1160F RVW MEDS BY RX/DR IN RCRD: CPT | Mod: CPTII,S$GLB,, | Performed by: FAMILY MEDICINE

## 2022-04-14 PROCEDURE — 1159F PR MEDICATION LIST DOCUMENTED IN MEDICAL RECORD: ICD-10-PCS | Mod: CPTII,S$GLB,, | Performed by: FAMILY MEDICINE

## 2022-04-14 PROCEDURE — 99999 PR PBB SHADOW E&M-EST. PATIENT-LVL III: ICD-10-PCS | Mod: PBBFAC,,, | Performed by: FAMILY MEDICINE

## 2022-04-14 PROCEDURE — 3074F PR MOST RECENT SYSTOLIC BLOOD PRESSURE < 130 MM HG: ICD-10-PCS | Mod: CPTII,S$GLB,, | Performed by: FAMILY MEDICINE

## 2022-04-14 PROCEDURE — 1126F PR PAIN SEVERITY QUANTIFIED, NO PAIN PRESENT: ICD-10-PCS | Mod: CPTII,S$GLB,, | Performed by: FAMILY MEDICINE

## 2022-04-14 RX ORDER — MELOXICAM 15 MG/1
15 TABLET ORAL DAILY
Qty: 30 TABLET | Refills: 2 | Status: SHIPPED | OUTPATIENT
Start: 2022-04-14 | End: 2023-04-27

## 2022-04-14 NOTE — PROGRESS NOTES
CHIEF COMPLAINT:  This is a 76-year-old male complaining of right upper extremity and left lower extremity pain.    SUBJECTIVE:  Patient complains of onset of pain in right elbow and left foot for several weeks.  He works out regularly in the gym.  He does arm curls with weights.  He has been having pain in his right medial elbow which is worse with certain movements.  He has had some improvement with rest.  He denies swelling, redness, warmth, numbness, tingling or weakness in limb.  He has not taken medication or applied ice or heat.  Patient complains of pain of plantar surface of left mid foot radiating to the heel.  Patient particularly has pain when he lifts the heel of his foot off the ground.  He runs for exercise and reports wearing good supportive shoes.  He denies history of injury or change in exercise routine.    The patient has a history of hyperlipidemia for which he takes takes Crestor 5 mg daily.  He has a history of mild carotid artery disease and arrhythmia. The patient sees his urologist twice yearly and takes Cialis 5 mg daily for ED infrequently. He takes trazodone 50 mg nightly for insomnia.  Patient has normal kidney function.    ROS:  GENERAL: Patient denies fever, chills, night sweats.  Patient denies weight gain or loss. Patient denies anorexia, fatigue, weakness or swollen glands.  SKIN: Patient denies rash.  HEENT: Patient denies sore throat, ear pain, hearing loss, nasal congestion, or runny nose. Patient denies visual disturbance, eye irritation or discharge.  LUNGS: Patient denies cough, wheeze or hemoptysis.  CARDIOVASCULAR: Patient denies chest pain, shortness of breath, palpitations, syncope or lower extremity edema.  GI: Patient denies abdominal pain, nausea, vomiting, diarrhea, constipation, blood in stool or melena.  GENITOURINARY: Patient denies dysuria, frequency, hematuria, nocturia, urgency or incontinence.  MUSCULOSKELETAL: Patient denies joint swelling, redness or warmth.   Positive for joint pain.  NEUROLOGIC: Patient denies headache, vertigo, paresthesias, weakness in limb, or abnormality of gait.    OBJECTIVE:   GENERAL:  Well-developed well-nourished pleasant white male alert and oriented x3, in no acute distress.    SKIN: Clear without rash.  Normal color and tone.  HEENT: Eyes: Clear conjunctivae.  No scleral icterus.    NECK: Supple, normal range of motion.    LUNGS: Clear to auscultation.  Normal respiratory effort.  CARDIOVASCULAR: Regular rhythm, normal S1, S2 without murmur, gallop or rub.  EXTREMITIES: Without cyanosis, clubbing or edema.  Distal pulses 2+ and equal.  Normal range of motion in all extremities.  No joint effusion, erythema or warmth.  Tenderness medial humeral epicondyle.  Tenderness insertion of left plantar fascia on medial heel.  NEUROLOGIC:  Motor strength equal bilaterally.  Sensation normal to touch.  Deep tendon reflexes 2+ and equal.  Gait without abnormality.  No tremor.      ASSESSMENT:  1. Medial epicondylitis of right elbow    2. Plantar fasciitis of left foot    3. Hyperlipidemia, unspecified hyperlipidemia type    4. Carotid artery disease, unspecified laterality, unspecified type      PLAN:   1. Apply ice or cold compresses q.i.d. for 15-20 minutes to affected areas.  2. Meloxicam 15 mg daily with food.  3. Exercises for golfer's elbow and plantar fasciitis given.  4. Limit activity according to pain greater than 3/10 on the pain scale.  5. Follow-up if no improvement or worsening symptoms.      30 minutes of total time spent on the encounter, which includes face to face time and non-face to face time preparing to see the patient (eg, review of tests), Obtaining and/or reviewing separately obtained history, Documenting clinical information in the electronic or other health record, Independently interpreting results (not separately reported) and communicating results to the patient/family/caregiver, or Care coordination (not separately  reported).     This note is generated with speech recognition software and is subject to transcription error and sound alike phrases that may be missed by proofreading.

## 2022-11-29 ENCOUNTER — PATIENT MESSAGE (OUTPATIENT)
Dept: RESEARCH | Facility: HOSPITAL | Age: 77
End: 2022-11-29
Payer: MEDICARE

## 2023-02-07 DIAGNOSIS — Z00.00 ENCOUNTER FOR MEDICARE ANNUAL WELLNESS EXAM: ICD-10-CM

## 2023-02-09 DIAGNOSIS — Z00.00 ENCOUNTER FOR MEDICARE ANNUAL WELLNESS EXAM: ICD-10-CM

## 2023-02-28 ENCOUNTER — LAB VISIT (OUTPATIENT)
Dept: LAB | Facility: HOSPITAL | Age: 78
End: 2023-02-28
Attending: INTERNAL MEDICINE
Payer: MEDICARE

## 2023-02-28 ENCOUNTER — OFFICE VISIT (OUTPATIENT)
Dept: FAMILY MEDICINE | Facility: CLINIC | Age: 78
End: 2023-02-28
Payer: MEDICARE

## 2023-02-28 VITALS
RESPIRATION RATE: 16 BRPM | DIASTOLIC BLOOD PRESSURE: 80 MMHG | OXYGEN SATURATION: 98 % | SYSTOLIC BLOOD PRESSURE: 130 MMHG | TEMPERATURE: 98 F | BODY MASS INDEX: 25.69 KG/M2 | WEIGHT: 179 LBS | HEART RATE: 51 BPM

## 2023-02-28 DIAGNOSIS — E78.5 HYPERLIPIDEMIA, UNSPECIFIED HYPERLIPIDEMIA TYPE: ICD-10-CM

## 2023-02-28 DIAGNOSIS — Z12.5 ENCOUNTER FOR PROSTATE CANCER SCREENING: ICD-10-CM

## 2023-02-28 DIAGNOSIS — Z12.11 COLON CANCER SCREENING: ICD-10-CM

## 2023-02-28 DIAGNOSIS — E78.5 HYPERLIPIDEMIA, UNSPECIFIED HYPERLIPIDEMIA TYPE: Primary | ICD-10-CM

## 2023-02-28 DIAGNOSIS — K40.90 RIGHT GROIN HERNIA: ICD-10-CM

## 2023-02-28 DIAGNOSIS — L98.9 SKIN LESIONS: ICD-10-CM

## 2023-02-28 LAB
ALBUMIN SERPL BCP-MCNC: 4 G/DL (ref 3.5–5.2)
ALP SERPL-CCNC: 56 U/L (ref 55–135)
ALT SERPL W/O P-5'-P-CCNC: 16 U/L (ref 10–44)
ANION GAP SERPL CALC-SCNC: 7 MMOL/L (ref 8–16)
AST SERPL-CCNC: 26 U/L (ref 10–40)
BASOPHILS # BLD AUTO: 0.03 K/UL (ref 0–0.2)
BASOPHILS NFR BLD: 0.5 % (ref 0–1.9)
BILIRUB SERPL-MCNC: 0.7 MG/DL (ref 0.1–1)
BUN SERPL-MCNC: 10 MG/DL (ref 8–23)
CALCIUM SERPL-MCNC: 9.7 MG/DL (ref 8.7–10.5)
CHLORIDE SERPL-SCNC: 103 MMOL/L (ref 95–110)
CHOLEST SERPL-MCNC: 185 MG/DL (ref 120–199)
CHOLEST/HDLC SERPL: 2.3 {RATIO} (ref 2–5)
CO2 SERPL-SCNC: 28 MMOL/L (ref 23–29)
CREAT SERPL-MCNC: 1 MG/DL (ref 0.5–1.4)
DIFFERENTIAL METHOD: ABNORMAL
EOSINOPHIL # BLD AUTO: 0.2 K/UL (ref 0–0.5)
EOSINOPHIL NFR BLD: 3.6 % (ref 0–8)
ERYTHROCYTE [DISTWIDTH] IN BLOOD BY AUTOMATED COUNT: 15.8 % (ref 11.5–14.5)
EST. GFR  (NO RACE VARIABLE): >60 ML/MIN/1.73 M^2
GLUCOSE SERPL-MCNC: 88 MG/DL (ref 70–110)
HCT VFR BLD AUTO: 48.2 % (ref 40–54)
HDLC SERPL-MCNC: 80 MG/DL (ref 40–75)
HDLC SERPL: 43.2 % (ref 20–50)
HGB BLD-MCNC: 14.9 G/DL (ref 14–18)
IMM GRANULOCYTES # BLD AUTO: 0.05 K/UL (ref 0–0.04)
IMM GRANULOCYTES NFR BLD AUTO: 0.8 % (ref 0–0.5)
LDLC SERPL CALC-MCNC: 92.6 MG/DL (ref 63–159)
LYMPHOCYTES # BLD AUTO: 1.7 K/UL (ref 1–4.8)
LYMPHOCYTES NFR BLD: 26.5 % (ref 18–48)
MCH RBC QN AUTO: 28.2 PG (ref 27–31)
MCHC RBC AUTO-ENTMCNC: 30.9 G/DL (ref 32–36)
MCV RBC AUTO: 91 FL (ref 82–98)
MONOCYTES # BLD AUTO: 0.7 K/UL (ref 0.3–1)
MONOCYTES NFR BLD: 11.4 % (ref 4–15)
NEUTROPHILS # BLD AUTO: 3.7 K/UL (ref 1.8–7.7)
NEUTROPHILS NFR BLD: 57.2 % (ref 38–73)
NONHDLC SERPL-MCNC: 105 MG/DL
NRBC BLD-RTO: 0 /100 WBC
PLATELET # BLD AUTO: 237 K/UL (ref 150–450)
PMV BLD AUTO: 10.4 FL (ref 9.2–12.9)
POTASSIUM SERPL-SCNC: 4.6 MMOL/L (ref 3.5–5.1)
PROT SERPL-MCNC: 6.8 G/DL (ref 6–8.4)
RBC # BLD AUTO: 5.28 M/UL (ref 4.6–6.2)
SODIUM SERPL-SCNC: 138 MMOL/L (ref 136–145)
TRIGL SERPL-MCNC: 62 MG/DL (ref 30–150)
WBC # BLD AUTO: 6.41 K/UL (ref 3.9–12.7)

## 2023-02-28 PROCEDURE — 85025 COMPLETE CBC W/AUTO DIFF WBC: CPT | Mod: HCNC | Performed by: INTERNAL MEDICINE

## 2023-02-28 PROCEDURE — 1101F PR PT FALLS ASSESS DOC 0-1 FALLS W/OUT INJ PAST YR: ICD-10-PCS | Mod: HCNC,CPTII,S$GLB, | Performed by: INTERNAL MEDICINE

## 2023-02-28 PROCEDURE — 99999 PR PBB SHADOW E&M-EST. PATIENT-LVL V: ICD-10-PCS | Mod: PBBFAC,HCNC,, | Performed by: INTERNAL MEDICINE

## 2023-02-28 PROCEDURE — 3288F FALL RISK ASSESSMENT DOCD: CPT | Mod: HCNC,CPTII,S$GLB, | Performed by: INTERNAL MEDICINE

## 2023-02-28 PROCEDURE — 80061 LIPID PANEL: CPT | Mod: HCNC | Performed by: INTERNAL MEDICINE

## 2023-02-28 PROCEDURE — 3075F SYST BP GE 130 - 139MM HG: CPT | Mod: HCNC,CPTII,S$GLB, | Performed by: INTERNAL MEDICINE

## 2023-02-28 PROCEDURE — 3288F PR FALLS RISK ASSESSMENT DOCUMENTED: ICD-10-PCS | Mod: HCNC,CPTII,S$GLB, | Performed by: INTERNAL MEDICINE

## 2023-02-28 PROCEDURE — 3079F DIAST BP 80-89 MM HG: CPT | Mod: HCNC,CPTII,S$GLB, | Performed by: INTERNAL MEDICINE

## 2023-02-28 PROCEDURE — 1126F PR PAIN SEVERITY QUANTIFIED, NO PAIN PRESENT: ICD-10-PCS | Mod: HCNC,CPTII,S$GLB, | Performed by: INTERNAL MEDICINE

## 2023-02-28 PROCEDURE — 99214 PR OFFICE/OUTPT VISIT, EST, LEVL IV, 30-39 MIN: ICD-10-PCS | Mod: HCNC,S$GLB,, | Performed by: INTERNAL MEDICINE

## 2023-02-28 PROCEDURE — 1159F PR MEDICATION LIST DOCUMENTED IN MEDICAL RECORD: ICD-10-PCS | Mod: HCNC,CPTII,S$GLB, | Performed by: INTERNAL MEDICINE

## 2023-02-28 PROCEDURE — 1101F PT FALLS ASSESS-DOCD LE1/YR: CPT | Mod: HCNC,CPTII,S$GLB, | Performed by: INTERNAL MEDICINE

## 2023-02-28 PROCEDURE — 84443 ASSAY THYROID STIM HORMONE: CPT | Mod: HCNC | Performed by: INTERNAL MEDICINE

## 2023-02-28 PROCEDURE — 99214 OFFICE O/P EST MOD 30 MIN: CPT | Mod: HCNC,S$GLB,, | Performed by: INTERNAL MEDICINE

## 2023-02-28 PROCEDURE — 36415 COLL VENOUS BLD VENIPUNCTURE: CPT | Mod: HCNC,PO | Performed by: INTERNAL MEDICINE

## 2023-02-28 PROCEDURE — 99999 PR PBB SHADOW E&M-EST. PATIENT-LVL V: CPT | Mod: PBBFAC,HCNC,, | Performed by: INTERNAL MEDICINE

## 2023-02-28 PROCEDURE — 84153 ASSAY OF PSA TOTAL: CPT | Mod: HCNC | Performed by: INTERNAL MEDICINE

## 2023-02-28 PROCEDURE — 1159F MED LIST DOCD IN RCRD: CPT | Mod: HCNC,CPTII,S$GLB, | Performed by: INTERNAL MEDICINE

## 2023-02-28 PROCEDURE — 3079F PR MOST RECENT DIASTOLIC BLOOD PRESSURE 80-89 MM HG: ICD-10-PCS | Mod: HCNC,CPTII,S$GLB, | Performed by: INTERNAL MEDICINE

## 2023-02-28 PROCEDURE — 80053 COMPREHEN METABOLIC PANEL: CPT | Mod: HCNC | Performed by: INTERNAL MEDICINE

## 2023-02-28 PROCEDURE — 1126F AMNT PAIN NOTED NONE PRSNT: CPT | Mod: HCNC,CPTII,S$GLB, | Performed by: INTERNAL MEDICINE

## 2023-02-28 PROCEDURE — 3075F PR MOST RECENT SYSTOLIC BLOOD PRESS GE 130-139MM HG: ICD-10-PCS | Mod: HCNC,CPTII,S$GLB, | Performed by: INTERNAL MEDICINE

## 2023-02-28 NOTE — PROGRESS NOTES
Subjective:       Patient ID: Jose Soto Jr. is a 77 y.o. male.    Chief Complaint: Annual Exam and Hyperlipidemia    Physical-------    Hyperlipidemia  Pertinent negatives include no chest pain, myalgias or shortness of breath.   Past Medical History:   Diagnosis Date    Carotid artery disease     Mild    Dyshidrotic eczema     ED (erectile dysfunction)     History of cardiac arrhythmia     Hyperlipidemia     Mild cognitive impairment     Pancreatic cyst     Shingles     Tinnitus of both ears     Urinary incontinence      Past Surgical History:   Procedure Laterality Date    CATARACT EXTRACTION W/  INTRAOCULAR LENS IMPLANT Bilateral 08/2017    EYE SURGERY Bilateral 05/2018    Laser treatment     INGUINAL HERNIA REPAIR      TONSILLECTOMY       Family History   Problem Relation Age of Onset    Colon cancer Mother     Heart disease Father     No Known Problems Sister     No Known Problems Sister      Social History     Socioeconomic History    Marital status: Single   Tobacco Use    Smoking status: Never    Smokeless tobacco: Never   Substance and Sexual Activity    Alcohol use: Yes     Alcohol/week: 6.0 - 8.0 standard drinks     Types: 6 - 8 Glasses of wine per week    Drug use: No   Social History Narrative    The patient is  and is now living with his girlfriend who is in ill health.  He has 3 adult children.  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     Review of Systems   Constitutional:  Negative for activity change, appetite change, chills, diaphoresis, fatigue, fever and unexpected weight change.   HENT:  Negative for drooling, ear discharge, ear pain, facial swelling, hearing loss, mouth sores, nosebleeds, postnasal drip, rhinorrhea, sinus pressure, sneezing, sore throat, tinnitus, trouble swallowing and voice change.    Eyes:  Negative for photophobia, redness and visual disturbance.   Respiratory:  Negative for apnea, cough, choking, chest tightness, shortness of breath and  wheezing.    Cardiovascular:  Negative for chest pain, palpitations and leg swelling.   Gastrointestinal:  Negative for abdominal distention, abdominal pain, anal bleeding, blood in stool, constipation, diarrhea, nausea and vomiting.   Endocrine: Negative for cold intolerance, heat intolerance, polydipsia, polyphagia and polyuria.   Genitourinary:  Negative for difficulty urinating, dysuria, enuresis, flank pain, frequency, genital sores, hematuria and urgency.        Protrusion right groin   Musculoskeletal:  Negative for arthralgias, back pain, gait problem, joint swelling, myalgias, neck pain and neck stiffness.   Skin:  Negative for color change, pallor, rash and wound.        Skin lesions   Allergic/Immunologic: Negative for food allergies and immunocompromised state.   Neurological:  Negative for dizziness, tremors, seizures, syncope, facial asymmetry, speech difficulty, weakness, light-headedness, numbness and headaches.   Hematological:  Negative for adenopathy. Does not bruise/bleed easily.   Psychiatric/Behavioral:  Negative for agitation, behavioral problems, confusion, decreased concentration, dysphoric mood, hallucinations, self-injury, sleep disturbance and suicidal ideas. The patient is not nervous/anxious and is not hyperactive.      Objective:      Physical Exam  Vitals and nursing note reviewed.   Constitutional:       General: He is not in acute distress.     Appearance: Normal appearance. He is well-developed. He is not diaphoretic.   HENT:      Head: Normocephalic and atraumatic.      Mouth/Throat:      Pharynx: No oropharyngeal exudate.   Eyes:      General: No scleral icterus.     Pupils: Pupils are equal, round, and reactive to light.   Neck:      Thyroid: No thyromegaly.      Vascular: No carotid bruit or JVD.      Trachea: No tracheal deviation.   Cardiovascular:      Rate and Rhythm: Normal rate and regular rhythm.      Heart sounds: Normal heart sounds.   Pulmonary:      Effort: Pulmonary  effort is normal. No respiratory distress.      Breath sounds: Normal breath sounds. No wheezing or rales.   Chest:      Chest wall: No tenderness.   Abdominal:      General: Bowel sounds are normal. There is no distension.      Palpations: Abdomen is soft.      Tenderness: There is no abdominal tenderness. There is no guarding or rebound.      Comments: Reducible hernia right groin   Musculoskeletal:         General: No tenderness. Normal range of motion.      Cervical back: Normal range of motion and neck supple.   Lymphadenopathy:      Cervical: No cervical adenopathy.   Skin:     General: Skin is warm and dry.      Coloration: Skin is not pale.      Findings: No erythema or rash.   Neurological:      Mental Status: He is alert and oriented to person, place, and time.   Psychiatric:         Behavior: Behavior normal.         Thought Content: Thought content normal.         Judgment: Judgment normal.       CMP  Sodium   Date Value Ref Range Status   09/09/2021 139 136 - 145 mmol/L Final     Potassium   Date Value Ref Range Status   09/09/2021 4.0 3.5 - 5.1 mmol/L Final     Chloride   Date Value Ref Range Status   09/09/2021 102 95 - 110 mmol/L Final     CO2   Date Value Ref Range Status   09/09/2021 26 23 - 29 mmol/L Final     Glucose   Date Value Ref Range Status   09/09/2021 84 70 - 110 mg/dL Final     BUN   Date Value Ref Range Status   09/09/2021 13 8 - 23 mg/dL Final     Creatinine   Date Value Ref Range Status   09/09/2021 0.9 0.5 - 1.4 mg/dL Final     Calcium   Date Value Ref Range Status   09/09/2021 9.7 8.7 - 10.5 mg/dL Final     Total Protein   Date Value Ref Range Status   09/09/2021 7.1 6.0 - 8.4 g/dL Final     Albumin   Date Value Ref Range Status   09/09/2021 4.0 3.5 - 5.2 g/dL Final     Total Bilirubin   Date Value Ref Range Status   09/09/2021 1.2 (H) 0.1 - 1.0 mg/dL Final     Comment:     For infants and newborns, interpretation of results should be based  on gestational age, weight and in  agreement with clinical  observations.    Premature Infant recommended reference ranges:  Up to 24 hours.............<8.0 mg/dL  Up to 48 hours............<12.0 mg/dL  3-5 days..................<15.0 mg/dL  6-29 days.................<15.0 mg/dL       Alkaline Phosphatase   Date Value Ref Range Status   09/09/2021 67 55 - 135 U/L Final     AST   Date Value Ref Range Status   09/09/2021 29 10 - 40 U/L Final     ALT   Date Value Ref Range Status   09/09/2021 32 10 - 44 U/L Final     Anion Gap   Date Value Ref Range Status   09/09/2021 11 8 - 16 mmol/L Final     eGFR if    Date Value Ref Range Status   09/09/2021 >60.0 >60 mL/min/1.73 m^2 Final     eGFR if non    Date Value Ref Range Status   09/09/2021 >60.0 >60 mL/min/1.73 m^2 Final     Comment:     Calculation used to obtain the estimated glomerular filtration  rate (eGFR) is the CKD-EPI equation.        Lab Results   Component Value Date    WBC 6.68 09/09/2021    HGB 13.4 (L) 09/09/2021    HCT 43.0 09/09/2021    MCV 84 09/09/2021     09/09/2021     Lab Results   Component Value Date    CHOL 198 09/09/2021     Lab Results   Component Value Date    HDL 80 (H) 09/09/2021     Lab Results   Component Value Date    LDLCALC 106.6 09/09/2021     Lab Results   Component Value Date    TRIG 57 09/09/2021     Lab Results   Component Value Date    CHOLHDL 40.4 09/09/2021     Lab Results   Component Value Date    TSH 1.180 09/09/2021     No results found for: LABA1C, HGBA1C  Assessment:       1. Hyperlipidemia, unspecified hyperlipidemia type    2. Right groin hernia    3. Skin lesions    4. Encounter for prostate cancer screening    5. Colon cancer screening          Plan:   Hyperlipidemia, unspecified hyperlipidemia type  -     Comprehensive Metabolic Panel; Future; Expected date: 02/28/2023  -     CBC Auto Differential; Future; Expected date: 02/28/2023  -     Lipid Panel; Future; Expected date: 02/28/2023  -     TSH; Future; Expected  date: 02/28/2023    Right groin hernia  -     Ambulatory referral/consult to General Surgery; Future; Expected date: 03/07/2023    Skin lesions  -     Ambulatory referral/consult to Dermatology; Future; Expected date: 03/07/2023    Encounter for prostate cancer screening-------sees urology dr wynn-  -     PSA, Screening; Future; Expected date: 02/28/2023    Colon cancer screening  -     Ambulatory referral/consult to Endo Procedure ; Future; Expected date: 03/01/2023    Stable--------------------------continue meds--------------------------as above---------------------f/u PCP------------

## 2023-03-01 ENCOUNTER — TELEPHONE (OUTPATIENT)
Dept: FAMILY MEDICINE | Facility: CLINIC | Age: 78
End: 2023-03-01
Payer: MEDICARE

## 2023-03-01 LAB
COMPLEXED PSA SERPL-MCNC: 2.1 NG/ML (ref 0–4)
TSH SERPL DL<=0.005 MIU/L-ACNC: 1.44 UIU/ML (ref 0.4–4)

## 2023-03-01 NOTE — TELEPHONE ENCOUNTER
----- Message from Musa Murillo sent at 3/1/2023  8:41 AM CST -----  Who Called: patient          Name of Test (Lab/Mammo/Etc): cholesterol lab          Date of Test: 2/28/23         Ordering Provider: Cassandra did blood test with selena pt states         Where the test was performed: [facility] lab         Best Call Back Number: [phone]            Additional Information:

## 2023-03-07 ENCOUNTER — OFFICE VISIT (OUTPATIENT)
Dept: SURGERY | Facility: CLINIC | Age: 78
End: 2023-03-07
Payer: MEDICARE

## 2023-03-07 VITALS
WEIGHT: 178.13 LBS | HEART RATE: 57 BPM | SYSTOLIC BLOOD PRESSURE: 127 MMHG | DIASTOLIC BLOOD PRESSURE: 77 MMHG | BODY MASS INDEX: 25.56 KG/M2

## 2023-03-07 DIAGNOSIS — Z01.818 PREOPERATIVE TESTING: ICD-10-CM

## 2023-03-07 DIAGNOSIS — K40.90 INGUINAL HERNIA OF RIGHT SIDE WITHOUT OBSTRUCTION OR GANGRENE: Primary | ICD-10-CM

## 2023-03-07 DIAGNOSIS — K40.90 RIGHT GROIN HERNIA: ICD-10-CM

## 2023-03-07 PROCEDURE — 1159F MED LIST DOCD IN RCRD: CPT | Mod: HCNC,CPTII,S$GLB, | Performed by: SURGERY

## 2023-03-07 PROCEDURE — 3078F PR MOST RECENT DIASTOLIC BLOOD PRESSURE < 80 MM HG: ICD-10-PCS | Mod: HCNC,CPTII,S$GLB, | Performed by: SURGERY

## 2023-03-07 PROCEDURE — 99204 PR OFFICE/OUTPT VISIT, NEW, LEVL IV, 45-59 MIN: ICD-10-PCS | Mod: HCNC,S$GLB,, | Performed by: SURGERY

## 2023-03-07 PROCEDURE — 99204 OFFICE O/P NEW MOD 45 MIN: CPT | Mod: HCNC,S$GLB,, | Performed by: SURGERY

## 2023-03-07 PROCEDURE — 99999 PR PBB SHADOW E&M-EST. PATIENT-LVL IV: ICD-10-PCS | Mod: PBBFAC,HCNC,, | Performed by: SURGERY

## 2023-03-07 PROCEDURE — 3074F SYST BP LT 130 MM HG: CPT | Mod: HCNC,CPTII,S$GLB, | Performed by: SURGERY

## 2023-03-07 PROCEDURE — 3078F DIAST BP <80 MM HG: CPT | Mod: HCNC,CPTII,S$GLB, | Performed by: SURGERY

## 2023-03-07 PROCEDURE — 1126F PR PAIN SEVERITY QUANTIFIED, NO PAIN PRESENT: ICD-10-PCS | Mod: HCNC,CPTII,S$GLB, | Performed by: SURGERY

## 2023-03-07 PROCEDURE — 1159F PR MEDICATION LIST DOCUMENTED IN MEDICAL RECORD: ICD-10-PCS | Mod: HCNC,CPTII,S$GLB, | Performed by: SURGERY

## 2023-03-07 PROCEDURE — 99999 PR PBB SHADOW E&M-EST. PATIENT-LVL IV: CPT | Mod: PBBFAC,HCNC,, | Performed by: SURGERY

## 2023-03-07 PROCEDURE — 1126F AMNT PAIN NOTED NONE PRSNT: CPT | Mod: HCNC,CPTII,S$GLB, | Performed by: SURGERY

## 2023-03-07 PROCEDURE — 3074F PR MOST RECENT SYSTOLIC BLOOD PRESSURE < 130 MM HG: ICD-10-PCS | Mod: HCNC,CPTII,S$GLB, | Performed by: SURGERY

## 2023-03-07 NOTE — PROGRESS NOTES
[facility] -   General Surgery History & Physical    SUBJECTIVE:     History of Present Illness:  Patient is a 77 y.o. male presents with a right inguinal hernia which he first noted about a month ago. He states the hernia bulges out and he has to push it back in. It does not cause him severe pain,  but it is uncomfortable. He is quite active and enjoys running and exercising--he worries that this will start to interfere with that.  Thinks he might have had a left inguinal hernia repair in the past, but he is unsure.      Review of patient's allergies indicates:  No Known Allergies    Current Outpatient Medications   Medication Sig Dispense Refill    ascorbic acid, vitamin C, (VITAMIN C) 500 MG tablet Take 500 mg by mouth once daily.      aspirin (ECOTRIN) 81 MG EC tablet Take 81 mg by mouth.      cholecalciferol, vitamin D3, 2,000 unit Cap Take 1 capsule by mouth once daily.      cyanocobalamin (VITAMIN B-12) 1000 MCG tablet Take 100 mcg by mouth once daily.      meloxicam (MOBIC) 15 MG tablet Take 1 tablet (15 mg total) by mouth once daily. 30 tablet 2    multivitamin capsule Take 2 capsules by mouth once daily.      rosuvastatin (CRESTOR) 5 MG tablet TAKE 1 TABLET EVERY DAY 90 tablet 0    traZODone (DESYREL) 100 MG tablet TAKE 1 TABLET EVERY EVENING 90 tablet 0    vitamin E acetate (VITAMIN E ORAL) Take 180 Units by mouth once daily.        No current facility-administered medications for this visit.       Past Medical History:   Diagnosis Date    Carotid artery disease     Mild    Dyshidrotic eczema     ED (erectile dysfunction)     History of cardiac arrhythmia     Hyperlipidemia     Mild cognitive impairment     Pancreatic cyst     Shingles     Tinnitus of both ears     Urinary incontinence      Past Surgical History:   Procedure Laterality Date    CATARACT EXTRACTION W/  INTRAOCULAR LENS IMPLANT Bilateral 08/2017    EYE SURGERY Bilateral 05/2018    Laser treatment     INGUINAL HERNIA REPAIR       TONSILLECTOMY       Family History   Problem Relation Age of Onset    Colon cancer Mother     Heart disease Father     No Known Problems Sister     No Known Problems Sister      Social History     Tobacco Use    Smoking status: Never    Smokeless tobacco: Never   Substance Use Topics    Alcohol use: Yes     Alcohol/week: 6.0 - 8.0 standard drinks     Types: 6 - 8 Glasses of wine per week    Drug use: No        Review of Systems:  Review of Systems   Constitutional:  Negative for fever.   Gastrointestinal:  Negative for abdominal pain and constipation.     OBJECTIVE:     Vital Signs (Most Recent)  Pulse: (!) 57 (03/07/23 1421)  BP: 127/77 (03/07/23 1421)     80.8 kg (178 lb 2.1 oz)     Physical Exam:  Physical Exam  Vitals and nursing note reviewed.   Constitutional:       General: He is not in acute distress.     Appearance: He is not ill-appearing, toxic-appearing or diaphoretic.   HENT:      Head: Normocephalic.      Nose: Nose normal.      Mouth/Throat:      Mouth: Mucous membranes are moist.   Eyes:      General: No scleral icterus.        Right eye: No discharge.         Left eye: No discharge.      Extraocular Movements: Extraocular movements intact.   Cardiovascular:      Rate and Rhythm: Normal rate and regular rhythm.   Pulmonary:      Effort: No respiratory distress.      Breath sounds: No stridor.   Abdominal:      General: There is no distension.      Palpations: Abdomen is soft.      Tenderness: There is no abdominal tenderness.   Genitourinary:     Comments: Soft, reducible right inguinal hernia. No overlying skin changes. Possible small left inguinal hernia with valsalva.  Musculoskeletal:      Cervical back: No rigidity.   Skin:     General: Skin is warm and dry.      Coloration: Skin is not jaundiced or pale.   Neurological:      Mental Status: He is alert and oriented to person, place, and time.   Psychiatric:         Mood and Affect: Mood normal.         Behavior: Behavior normal.        Laboratory  WBC   Date Value Ref Range Status   02/28/2023 6.41 3.90 - 12.70 K/uL Final     Hemoglobin   Date Value Ref Range Status   02/28/2023 14.9 14.0 - 18.0 g/dL Final     Hematocrit   Date Value Ref Range Status   02/28/2023 48.2 40.0 - 54.0 % Final     Platelets   Date Value Ref Range Status   02/28/2023 237 150 - 450 K/uL Final     Sodium   Date Value Ref Range Status   02/28/2023 138 136 - 145 mmol/L Final     Potassium   Date Value Ref Range Status   02/28/2023 4.6 3.5 - 5.1 mmol/L Final     Creatinine   Date Value Ref Range Status   02/28/2023 1.0 0.5 - 1.4 mg/dL Final     Alkaline Phosphatase   Date Value Ref Range Status   02/28/2023 56 55 - 135 U/L Final     AST   Date Value Ref Range Status   02/28/2023 26 10 - 40 U/L Final     ALT   Date Value Ref Range Status   02/28/2023 16 10 - 44 U/L Final            ASSESSMENT/PLAN:     Jose was seen today for consult.    Diagnoses and all orders for this visit:    Inguinal hernia of right side without obstruction or gangrene  -     Case Request Operating Room:  ROBOTIC REPAIR, HERNIA, INGUINAL  -     CBC Auto Differential; Future  -     COMPREHENSIVE METABOLIC PANEL; Future  -     EKG 12-lead; Future    Right groin hernia  -     Ambulatory referral/consult to General Surgery    Preoperative testing  -     CBC Auto Differential; Future  -     COMPREHENSIVE METABOLIC PANEL; Future  -     EKG 12-lead; Future         Will plan for robotic-assisted laparoscopic right inguinal hernia repair with mesh, possible bilateral. 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  The postoperative restrictions were discussed including no heavy lifting >10 lbs, no straining/pushing/pulling, excessive bending or twisting, for at least 6 weeks otherwise there is a higher risk of recurrence.    After explaining the risks, benefits, and alternatives, patient verbalized understanding and would like to proceed with surgery. All questions were  answered to their satisfaction.      Patient expressed understanding and is in agreement.      Maria Luisa Brown, DO  General Surgery  [facility]  3/7/2023    I spent a total of 45 minutes on the day of the visit.  This includes face to face time and non-face to face time preparing to see the patient (eg, review of tests), obtaining and/or reviewing separately obtained history, documenting clinical information in the electronic or other health record, independently interpreting results and communicating results to the patient/family/caregiver, or care coordinator.

## 2023-03-09 ENCOUNTER — HOSPITAL ENCOUNTER (OUTPATIENT)
Dept: PREADMISSION TESTING | Facility: HOSPITAL | Age: 78
Discharge: HOME | End: 2023-03-09
Attending: SURGERY
Payer: MEDICARE

## 2023-03-09 DIAGNOSIS — Z12.11 COLON CANCER SCREENING: Primary | ICD-10-CM

## 2023-03-09 RX ORDER — POLYETHYLENE GLYCOL 3350, SODIUM SULFATE ANHYDROUS, SODIUM BICARBONATE, SODIUM CHLORIDE, POTASSIUM CHLORIDE 236; 22.74; 6.74; 5.86; 2.97 G/4L; G/4L; G/4L; G/4L; G/4L
4 POWDER, FOR SOLUTION ORAL ONCE
Qty: 4000 ML | Refills: 0 | Status: SHIPPED | OUTPATIENT
Start: 2023-03-09 | End: 2023-03-09

## 2023-04-18 ENCOUNTER — HOSPITAL ENCOUNTER (OUTPATIENT)
Dept: CARDIOLOGY | Facility: HOSPITAL | Age: 78
Discharge: HOME | End: 2023-04-18
Attending: SURGERY
Payer: MEDICARE

## 2023-04-18 DIAGNOSIS — Z01.818 PREOPERATIVE TESTING: ICD-10-CM

## 2023-04-18 DIAGNOSIS — K40.90 INGUINAL HERNIA OF RIGHT SIDE WITHOUT OBSTRUCTION OR GANGRENE: ICD-10-CM

## 2023-04-18 PROCEDURE — 93005 ELECTROCARDIOGRAM TRACING: CPT | Mod: HCNC

## 2023-04-18 PROCEDURE — 93010 EKG 12-LEAD: ICD-10-PCS | Mod: HCNC,,, | Performed by: INTERNAL MEDICINE

## 2023-04-18 PROCEDURE — 93010 ELECTROCARDIOGRAM REPORT: CPT | Mod: HCNC,,, | Performed by: INTERNAL MEDICINE

## 2023-04-21 DIAGNOSIS — Z12.11 ENCOUNTER FOR SCREENING COLONOSCOPY: Primary | ICD-10-CM

## 2023-04-21 RX ORDER — SODIUM, POTASSIUM,MAG SULFATES 17.5-3.13G
1 SOLUTION, RECONSTITUTED, ORAL ORAL DAILY
Qty: 1 KIT | Refills: 0 | Status: SHIPPED | OUTPATIENT
Start: 2023-04-21 | End: 2023-04-23

## 2023-04-24 RX ORDER — POLYETHYLENE GLYCOL 3350, SODIUM SULFATE ANHYDROUS, SODIUM BICARBONATE, SODIUM CHLORIDE, POTASSIUM CHLORIDE 236; 22.74; 6.74; 5.86; 2.97 G/4L; G/4L; G/4L; G/4L; G/4L
4 POWDER, FOR SOLUTION ORAL ONCE
Qty: 4000 ML | Refills: 0 | Status: SHIPPED | OUTPATIENT
Start: 2023-04-24 | End: 2023-04-24

## 2023-04-27 ENCOUNTER — OFFICE VISIT (OUTPATIENT)
Dept: INTERNAL MEDICINE | Facility: CLINIC | Age: 78
End: 2023-04-27
Payer: MEDICARE

## 2023-04-27 VITALS
OXYGEN SATURATION: 96 % | SYSTOLIC BLOOD PRESSURE: 135 MMHG | HEART RATE: 53 BPM | DIASTOLIC BLOOD PRESSURE: 82 MMHG | TEMPERATURE: 98 F | RESPIRATION RATE: 16 BRPM

## 2023-04-27 DIAGNOSIS — Z01.818 PREOP EXAMINATION: Primary | ICD-10-CM

## 2023-04-27 DIAGNOSIS — Z01.818 PREOP EXAMINATION: ICD-10-CM

## 2023-04-27 DIAGNOSIS — R00.1 BRADYCARDIA: ICD-10-CM

## 2023-04-27 DIAGNOSIS — K40.90 RIGHT INGUINAL HERNIA: ICD-10-CM

## 2023-04-27 DIAGNOSIS — Z01.818 PRE-OP EVALUATION: ICD-10-CM

## 2023-04-27 DIAGNOSIS — I77.9 CAROTID ARTERY DISEASE, UNSPECIFIED LATERALITY, UNSPECIFIED TYPE: ICD-10-CM

## 2023-04-27 PROCEDURE — 3075F PR MOST RECENT SYSTOLIC BLOOD PRESS GE 130-139MM HG: ICD-10-PCS | Mod: HCNC,CPTII,S$GLB, | Performed by: SPECIALIST

## 2023-04-27 PROCEDURE — 99204 OFFICE O/P NEW MOD 45 MIN: CPT | Mod: HCNC,S$GLB,, | Performed by: SPECIALIST

## 2023-04-27 PROCEDURE — 3079F PR MOST RECENT DIASTOLIC BLOOD PRESSURE 80-89 MM HG: ICD-10-PCS | Mod: HCNC,CPTII,S$GLB, | Performed by: SPECIALIST

## 2023-04-27 PROCEDURE — 99204 PR OFFICE/OUTPT VISIT, NEW, LEVL IV, 45-59 MIN: ICD-10-PCS | Mod: HCNC,S$GLB,, | Performed by: SPECIALIST

## 2023-04-27 PROCEDURE — 3075F SYST BP GE 130 - 139MM HG: CPT | Mod: HCNC,CPTII,S$GLB, | Performed by: SPECIALIST

## 2023-04-27 PROCEDURE — 99999 PR PBB SHADOW E&M-EST. PATIENT-LVL III: ICD-10-PCS | Mod: PBBFAC,HCNC,,

## 2023-04-27 PROCEDURE — 99999 PR PBB SHADOW E&M-EST. PATIENT-LVL III: CPT | Mod: PBBFAC,HCNC,,

## 2023-04-27 PROCEDURE — 3079F DIAST BP 80-89 MM HG: CPT | Mod: HCNC,CPTII,S$GLB, | Performed by: SPECIALIST

## 2023-04-27 NOTE — PROGRESS NOTES
Preoperative History and Physical                                                                     Chief Complaint: Preoperative evaluation     History of Present Illness:      Jose Soto Jr. is a 77 y.o. male with a history of CAD, HLD who presents to the office today for a preoperative consultation at the request of Dr. Brown who plans on performing lap hernia repair on May 4.     Functional Status:      The patient is able to climb a flight of stairs. The patient is able to ambulate without difficulty. The patient's functional status is not affected by the surgical problem. The patient's functional status is not affected by shortness of breath, chest pain, dyspnea on exertion and fatigue.    MET score greater than 4    Past Medical History:      Past Medical History:   Diagnosis Date    Carotid artery disease     Mild    Dyshidrotic eczema     ED (erectile dysfunction)     History of cardiac arrhythmia     Hyperlipidemia     Mild cognitive impairment     Pancreatic cyst     Shingles     Tinnitus of both ears     Urinary incontinence         Past Surgical History:      Past Surgical History:   Procedure Laterality Date    CATARACT EXTRACTION W/  INTRAOCULAR LENS IMPLANT Bilateral 08/2017    EYE SURGERY Bilateral 05/2018    Laser treatment     INGUINAL HERNIA REPAIR      TONSILLECTOMY          Social History:      Social History     Socioeconomic History    Marital status: Single   Tobacco Use    Smoking status: Never    Smokeless tobacco: Never   Substance and Sexual Activity    Alcohol use: Yes     Alcohol/week: 6.0 - 8.0 standard drinks     Types: 6 - 8 Glasses of wine per week    Drug use: No   Social History Narrative    The patient is  and is now living with his girlfriend who is in ill health.  He has 3 adult children.  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        Family History:      Family History   Problem Relation  Age of Onset    Colon cancer Mother     Heart disease Father     No Known Problems Sister     No Known Problems Sister        Allergies:      Review of patient's allergies indicates:  No Known Allergies    Medications:      Current Outpatient Medications   Medication Sig    ascorbic acid, vitamin C, (VITAMIN C) 500 MG tablet Take 500 mg by mouth once daily.    aspirin (ECOTRIN) 81 MG EC tablet Take 81 mg by mouth.    cholecalciferol, vitamin D3, 2,000 unit Cap Take 1 capsule by mouth once daily.    cyanocobalamin (VITAMIN B-12) 1000 MCG tablet Take 100 mcg by mouth once daily.    multivitamin capsule Take 2 capsules by mouth once daily.    polyethylene glycol (COLYTE) 240-22.72-6.72 -5.84 gram SolR Take 4,000 mLs by mouth once. for 1 dose    rosuvastatin (CRESTOR) 5 MG tablet TAKE 1 TABLET EVERY DAY    traZODone (DESYREL) 100 MG tablet TAKE 1 TABLET EVERY EVENING    vitamin E acetate (VITAMIN E ORAL) Take 180 Units by mouth once daily.      No current facility-administered medications for this visit.       Vitals:      Vitals:    04/27/23 1107   BP: 135/82   Pulse: (!) 53   Resp: 16   Temp: 97.5 °F (36.4 °C)       Review of Systems:        Constitutional: Negative for fever, chills, weight loss, malaise/fatigue and diaphoresis.   HENT: Negative for hearing loss, ear pain, nosebleeds, congestion, sore throat, neck pain, tinnitus and ear discharge.    Eyes: Negative for blurred vision, double vision, photophobia, pain, discharge and redness.   Respiratory: Negative for cough, hemoptysis, sputum production, shortness of breath, wheezing and stridor.    Cardiovascular: Negative for chest pain, palpitations, orthopnea, claudication, leg swelling and PND.   Gastrointestinal: Negative for heartburn, nausea, vomiting, abdominal pain, diarrhea, constipation, blood in stool and melena.   Genitourinary: Negative for dysuria, urgency, frequency, hematuria and flank pain.   Musculoskeletal: Negative for myalgias, back pain,  joint pain and falls.   Skin: Negative for itching and rash.   Neurological: Negative for dizziness, tingling, tremors, sensory change, speech change, focal weakness, seizures, loss of consciousness, weakness and headaches.   Endo/Heme/Allergies: Negative for environmental allergies and polydipsia. Does not bruise/bleed easily.   Psychiatric/Behavioral: Negative for depression, suicidal ideas, hallucinations, memory loss and substance abuse. The patient is not nervous/anxious and does not have insomnia.    All 14 systems reviewed and negative except as noted above.    Physical Exam:      Constitutional: Appears well-developed, well-nourished and in no acute distress.  Patient is oriented to person, place, and time.   Head: Normocephalic and atraumatic. Mucous membranes moist.  Neck: Neck supple no mass.   Cardiovascular: Normal rate and regular rhythm.  S1 S2 appreciated by ascultation.  Pulmonary/Chest: Effort normal and clear to auscultation bilaterally. No respiratory distress.   Abdomen: Soft. Non-tender and non-distended. Bowel sounds are normal.   Neurological: Patient is alert and oriented to person, place and time. Moves all extremities.  Skin: Warm and dry. No lesions.  Extremities: No clubbing, cyanosis or edema.    Laboratory data:      Reviewed and noted in plan where applicable. Please see chart for full laboratory data.    @[id number](cpk,cpkmb,troponini,mb)@ @[id number](poctglucose)@     No results found for: INR, PROTIME    Lab Results   Component Value Date    WBC 6.08 04/18/2023    HGB 15.0 04/18/2023    HCT 46.5 04/18/2023    MCV 88 04/18/2023     04/18/2023       @[id number](GLU,NA,K,Cl,CO2,BUN,Creatinine,Calcium,MG)@        Sodium   Date Value Ref Range Status   04/18/2023 137 136 - 145 mmol/L Final     Chloride   Date Value Ref Range Status   04/18/2023 101 95 - 110 mmol/L Final     CO2   Date Value Ref Range Status   04/18/2023 28 23 - 29 mmol/L Final     Glucose   Date Value Ref  Range Status   04/18/2023 94 70 - 110 mg/dL Final     BUN   Date Value Ref Range Status   04/18/2023 12 8 - 23 mg/dL Final     Creatinine   Date Value Ref Range Status   04/18/2023 1.0 0.5 - 1.4 mg/dL Final     Calcium   Date Value Ref Range Status   04/18/2023 9.6 8.7 - 10.5 mg/dL Final     Total Protein   Date Value Ref Range Status   04/18/2023 6.7 6.0 - 8.4 g/dL Final     Albumin   Date Value Ref Range Status   04/18/2023 4.1 3.5 - 5.2 g/dL Final     Total Bilirubin   Date Value Ref Range Status   04/18/2023 0.9 0.1 - 1.0 mg/dL Final     Comment:     For infants and newborns, interpretation of results should be based  on gestational age, weight and in agreement with clinical  observations.    Premature Infant recommended reference ranges:  Up to 24 hours.............<8.0 mg/dL  Up to 48 hours............<12.0 mg/dL  3-5 days..................<15.0 mg/dL  6-29 days.................<15.0 mg/dL       Alkaline Phosphatase   Date Value Ref Range Status   04/18/2023 65 55 - 135 U/L Final     AST   Date Value Ref Range Status   04/18/2023 28 10 - 40 U/L Final     ALT   Date Value Ref Range Status   04/18/2023 22 10 - 44 U/L Final     Anion Gap   Date Value Ref Range Status   04/18/2023 8 8 - 16 mmol/L Final     eGFR   Date Value Ref Range Status   04/18/2023 >60.0 >60 mL/min/1.73 m^2 Final           Predictors of intubation difficulty:       Morbid obesity? no   Anatomically abnormal facies? no   Prominent incisors? no   Receding mandible? no   Short, thick neck? no   Neck range of motion: normal   Dentition: No chipped, loose, or missing teeth.  Based on the Modified Mallampati, patient is a mallampati score: I (soft palate, uvula, fauces, and tonsillar pillars visible)    Cardiographics:      ECG: Sinus bradycardia  Echocardiogram: not indicated    Imaging:      Chest x-ray: not indicated    Assessment and Plan:      Pre-op evaluation  Known risk factors for perioperative complications: age, HLD, CAD  Difficulty with  intubation is not anticipated.    Cardiac Risk Estimation: Based on the Revised Cardiac Risk index, patient is a Class I risk with a 3.9 % risk of a major cardiac event in a low risk procedure.    1.) Preoperative workup as follows: ECG, hemoglobin, hematocrit, electrolytes, creatinine, glucose.  2.) Change in medication regimen before surgery: discontinue ASA 6 days before surgery, discontinue NSAIDs 5 days before surgery, hold Metformin 24 hours prior to surgery. Hold OTC vitamins, supplements for 7 days prior to sx  3.) Prophylaxis for cardiac events with perioperative beta-blockers: not indicated.  4.) Invasive hemodynamic monitoring perioperatively: at the discretion of anesthesiologist.  5.) Deep vein thrombosis prophylaxis postoperatively: regimen to be chosen by surgical team.  6.) Surveillance for postoperative MI with ECG immediately postoperatively and on postoperati ve days 1 and 2 AND troponin levels 24 hours postoperatively and on day 4 or hospital discharge (whichever comes first): not indicated.  7.) Current medications which may produce withdrawal symptoms if withheld perioperatively: none  8.) Other measures: Postoperative incentive spirometry to prevent pneumonia.    Carotid artery disease  - on statin and aspirin as an outpatient, holding aspirin 7 days prior to procedure  - denies any chest pain shortness or breath palpitation    Bradycardia  -heart rate ranges in the 50s   -patient is a runner, continues to run for an hour daily     Right inguinal hernia  - planning on hernia repair with dr. Brown 5/4        Electronically signed by Krista Mirza MSN, FNP-C on 4/27/2023 at 4:13 PM.

## 2023-04-27 NOTE — DISCHARGE INSTRUCTIONS
Pre op instructions reviewed with patient during Clinic Visit with Provider. Patient verbalized understanding.    To confirm, Surgery is scheduled on 5/4/23. We will call you late afternoon the business day prior to surgery with your arrival time.    *Please report to the [facility] (1st Floor) located off of [facility] (2nd Entrance/Building on the left, in front of the flag pole).  Address: [address] [place] [address] [place]. [address]      INSTRUCTIONS IMPORTANT!!!  Do Not Eat, Drink, or Smoke after 12 midnight unless instructed otherwise by your Surgeon. OK to brush teeth, no gum, candy or mints!      *Take Only these medications with a small sip of water Morning of Surgery as instructed by Krista Mirza, NP:  None     ____  HOLD all vitamins, herbal supplements, aspirin products & NSAIDS 7 days prior to surgery, as these can thin the blood.  ____  NO Acrylic/fake nails or nail polish worn day of surgery (specifically hand/arm & foot surgeries).  ____  NO powder, lotions, deodorants, oils or cream on body.  ____  Remove all jewelry & piercings prior to surgery.  ____  Remove Dentures, Hearing Aids & Contact Lens prior to surgery.  ____  Bring photo ID and insurance information to hospital (Leave Valuables at Home).  ____  If going home the same day, arrange for a ride home. You will not be able to drive for 24 hrs if Anesthesia was used.   ____  Females (ages 11-60): may need to give a urine sample the morning of surgery; please see Pre op Nurse prior to using the restroom.  ____  Males: Stop ED medications (Viagra, Cialis) 24 hrs prior to surgery.  ____  Wear clean, loose fitting clothing to allow for dressings/ bandages.            Diabetic Patients: If you take diabetic medication, do NOT take morning of surgery unless instructed by Doctor. Metformin to be stopped 24 hrs prior to surgery time. DO NOT take long-acting insulin the evening before surgery. Blood sugars will be checked in pre-op  by Nurse.    Bathing Instructions:    -Shower with anti-bacterial Soap (Hibiclens or Dial) the night before surgery and the morning of.   -Do not use Hibiclens on your face or genitals.   -Apply clean clothes after shower.  -Do not shave your face or body 2 days prior to surgery unless instructed otherwise by your Surgeon.  -Do not shave pubic hair 7 days prior to surgery (gyn pt's).    [facility] Visitor/Ride Policy:  Only 2 adults allowed (over the age of 18) to accompany you to the Hospital. You Must have a ride home from a responsible adult that you know and trust. Medical Transport, Uber or Lyft can only be used if patient has a responsible adult to accompany them during ride home.    Discharge Instructions: You will receive Post-op/Discharge instructions by your Discharge Nurse prior to going home. Please call your Surgeon's office with any post-surgery questions/concerns @ [phone].    *If you are running late or have questions the morning of surgery, please call the Surgery Dept @ [phone]  *Insurance/ Financial Questions, please call [phone]    Thank you,  -[facility] Pre Admit Testing Nurse  [phone] or [phone]  M-F 7:30 am-4:00 pm (Closed Major Holidays)

## 2023-04-27 NOTE — PRE-PROCEDURE INSTRUCTIONS
Pre op instructions reviewed with patient during Clinic Visit with Provider. Patient verbalized understanding.    To confirm, Surgery is scheduled on 5/4/23. We will call you late afternoon the business day prior to surgery with your arrival time.    *Please report to the [facility] (1st Floor) located off of [facility] (2nd Entrance/Building on the left, in front of the flag pole).  Address: [address] [place] [address] [place]. [address]      INSTRUCTIONS IMPORTANT!!!  Do Not Eat, Drink, or Smoke after 12 midnight unless instructed otherwise by your Surgeon. OK to brush teeth, no gum, candy or mints!      *Take Only these medications with a small sip of water Morning of Surgery as instructed by Krista Mirza, NP:  None     ____  HOLD all vitamins, herbal supplements, aspirin products & NSAIDS 7 days prior to surgery, as these can thin the blood.  ____  NO Acrylic/fake nails or nail polish worn day of surgery (specifically hand/arm & foot surgeries).  ____  NO powder, lotions, deodorants, oils or cream on body.  ____  Remove all jewelry & piercings prior to surgery.  ____  Remove Dentures, Hearing Aids & Contact Lens prior to surgery.  ____  Bring photo ID and insurance information to hospital (Leave Valuables at Home).  ____  If going home the same day, arrange for a ride home. You will not be able to drive for 24 hrs if Anesthesia was used.   ____  Females (ages 11-60): may need to give a urine sample the morning of surgery; please see Pre op Nurse prior to using the restroom.  ____  Males: Stop ED medications (Viagra, Cialis) 24 hrs prior to surgery.  ____  Wear clean, loose fitting clothing to allow for dressings/ bandages.            Diabetic Patients: If you take diabetic medication, do NOT take morning of surgery unless instructed by Doctor. Metformin to be stopped 24 hrs prior to surgery time. DO NOT take long-acting insulin the evening before surgery. Blood sugars will be checked in pre-op  by Nurse.    Bathing Instructions:    -Shower with anti-bacterial Soap (Hibiclens or Dial) the night before surgery and the morning of.   -Do not use Hibiclens on your face or genitals.   -Apply clean clothes after shower.  -Do not shave your face or body 2 days prior to surgery unless instructed otherwise by your Surgeon.  -Do not shave pubic hair 7 days prior to surgery (gyn pt's).    [facility]itor/Ride Policy:  Only 2 adults allowed (over the age of 18) to accompany you to the Hospital. You Must have a ride home from a responsible adult that you know and trust. Medical Transport, Uber or Lyft can only be used if patient has a responsible adult to accompany them during ride home.    Discharge Instructions: You will receive Post-op/Discharge instructions by your Discharge Nurse prior to going home. Please call your Surgeon's office with any post-surgery questions/concerns @ [phone].    *If you are running late or have questions the morning of surgery, please call the Surgery Dept @ [phone]  *Insurance/ Financial Questions, please call [phone]    Thank you,  -[facility] Pre [facility] Testing Nurse  [phone] or [phone]  M-F 7:30 am-4:00 pm (Closed Major Holidays)

## 2023-05-03 ENCOUNTER — ANESTHESIA (OUTPATIENT)
Dept: ENDOSCOPY | Facility: HOSPITAL | Age: 78
End: 2023-05-03
Payer: MEDICARE

## 2023-05-03 ENCOUNTER — HOSPITAL ENCOUNTER (OUTPATIENT)
Facility: HOSPITAL | Age: 78
Discharge: HOME | End: 2023-05-03
Attending: SURGERY | Admitting: SURGERY
Payer: MEDICARE

## 2023-05-03 ENCOUNTER — TELEPHONE (OUTPATIENT)
Dept: PREADMISSION TESTING | Facility: HOSPITAL | Age: 78
End: 2023-05-03
Payer: MEDICARE

## 2023-05-03 ENCOUNTER — ANESTHESIA EVENT (OUTPATIENT)
Dept: ENDOSCOPY | Facility: HOSPITAL | Age: 78
End: 2023-05-03
Payer: MEDICARE

## 2023-05-03 ENCOUNTER — ANESTHESIA EVENT (OUTPATIENT)
Dept: SURGERY | Facility: HOSPITAL | Age: 78
End: 2023-05-03
Payer: MEDICARE

## 2023-05-03 DIAGNOSIS — Z12.11 SCREENING FOR COLON CANCER: ICD-10-CM

## 2023-05-03 PROCEDURE — 45385 COLONOSCOPY W/LESION REMOVAL: CPT | Mod: PT,,, | Performed by: SURGERY

## 2023-05-03 PROCEDURE — 37000008 HC ANESTHESIA 1ST 15 MINUTES: Mod: HCNC | Performed by: SURGERY

## 2023-05-03 PROCEDURE — 45380 COLONOSCOPY AND BIOPSY: CPT | Mod: PT,59,, | Performed by: SURGERY

## 2023-05-03 PROCEDURE — 45380 COLONOSCOPY AND BIOPSY: CPT | Mod: PT,59 | Performed by: SURGERY

## 2023-05-03 PROCEDURE — 45385 PR COLONOSCOPY,REMV LESN,SNARE: ICD-10-PCS | Mod: PT,,, | Performed by: SURGERY

## 2023-05-03 PROCEDURE — 88305 TISSUE EXAM BY PATHOLOGIST: CPT | Mod: 59 | Performed by: PATHOLOGY

## 2023-05-03 PROCEDURE — 27201012 HC FORCEPS, HOT/COLD, DISP: Mod: HCNC | Performed by: SURGERY

## 2023-05-03 PROCEDURE — 63600175 PHARM REV CODE 636 W HCPCS: Mod: HCNC | Performed by: NURSE ANESTHETIST, CERTIFIED REGISTERED

## 2023-05-03 PROCEDURE — 45380 PR COLONOSCOPY,BIOPSY: ICD-10-PCS | Mod: PT,59,, | Performed by: SURGERY

## 2023-05-03 PROCEDURE — 45385 COLONOSCOPY W/LESION REMOVAL: CPT | Mod: PT | Performed by: SURGERY

## 2023-05-03 PROCEDURE — 88305 TISSUE EXAM BY PATHOLOGIST: CPT | Mod: 26,,, | Performed by: PATHOLOGY

## 2023-05-03 PROCEDURE — 37000009 HC ANESTHESIA EA ADD 15 MINS: Mod: HCNC | Performed by: SURGERY

## 2023-05-03 PROCEDURE — 27201089 HC SNARE, DISP (ANY): Mod: HCNC | Performed by: SURGERY

## 2023-05-03 PROCEDURE — 25000003 PHARM REV CODE 250: Mod: HCNC | Performed by: NURSE ANESTHETIST, CERTIFIED REGISTERED

## 2023-05-03 PROCEDURE — 88305 TISSUE EXAM BY PATHOLOGIST: ICD-10-PCS | Mod: 26,,, | Performed by: PATHOLOGY

## 2023-05-03 RX ORDER — LIDOCAINE HYDROCHLORIDE 10 MG/ML
INJECTION, SOLUTION EPIDURAL; INFILTRATION; INTRACAUDAL; PERINEURAL
Status: DISCONTINUED | OUTPATIENT
Start: 2023-05-03 | End: 2023-05-03

## 2023-05-03 RX ORDER — PROPOFOL 10 MG/ML
VIAL (ML) INTRAVENOUS
Status: DISCONTINUED | OUTPATIENT
Start: 2023-05-03 | End: 2023-05-03

## 2023-05-03 RX ADMIN — PROPOFOL 50 MG: 10 INJECTION, EMULSION INTRAVENOUS at 10:05

## 2023-05-03 RX ADMIN — PROPOFOL 50 MG: 10 INJECTION, EMULSION INTRAVENOUS at 11:05

## 2023-05-03 RX ADMIN — LIDOCAINE HYDROCHLORIDE 50 MG: 10 INJECTION, SOLUTION EPIDURAL; INFILTRATION; INTRACAUDAL; PERINEURAL at 10:05

## 2023-05-03 RX ADMIN — PROPOFOL 100 MG: 10 INJECTION, EMULSION INTRAVENOUS at 10:05

## 2023-05-03 RX ADMIN — SODIUM CHLORIDE, POTASSIUM CHLORIDE, SODIUM LACTATE AND CALCIUM CHLORIDE: 600; 310; 30; 20 INJECTION, SOLUTION INTRAVENOUS at 10:05

## 2023-05-03 NOTE — ANESTHESIA POSTPROCEDURE EVALUATION
Anesthesia Post Evaluation    Patient: Jose Soto     Procedure(s) Performed: Procedure(s) (LRB):  COLONOSCOPY (N/A)    Final Anesthesia Type: MAC      Patient location during evaluation: GI PACU  Patient participation: Yes- Able to Participate  Level of consciousness: awake and alert and oriented  Post-procedure vital signs: reviewed and stable  Pain management: adequate    PONV status at discharge: No PONV  Anesthetic complications: no      Cardiovascular status: stable, hemodynamically stable and blood pressure returned to baseline  Respiratory status: room air, spontaneous ventilation and unassisted  Hydration status: euvolemic  Follow-up not needed.          Vitals Value Taken Time   /69 05/03/23 1125   Temp 36.7 °C (98.1 °F) 05/03/23 1115   Pulse 47 05/03/23 1125   Resp 16 05/03/23 1125   SpO2 98 % 05/03/23 1115         No case tracking events are documented in the log.      Pain/Joss Score: Joss Score: 9 (5/3/2023 11:25 AM)

## 2023-05-03 NOTE — DISCHARGE SUMMARY
[facility])  Discharge Note  Short Stay    Procedure(s) (LRB):  COLONOSCOPY (N/A)      OUTCOME: Patient tolerated treatment/procedure well without complication and is now ready for discharge.    DISPOSITION: Home or Self Care    FINAL DIAGNOSIS:  <principal problem not specified>    FOLLOWUP: With primary care provider    DISCHARGE INSTRUCTIONS:  No discharge procedures on file.     TIME SPENT ON DISCHARGE: 10 minutes

## 2023-05-03 NOTE — TRANSFER OF CARE
"Anesthesia Transfer of Care Note    Patient: Jose Soto Jr.    Procedure(s) Performed: Procedure(s) (LRB):  COLONOSCOPY (N/A)    Patient location: GI    Anesthesia Type: MAC    Transport from OR: Transported from OR on room air with adequate spontaneous ventilation    Post pain: adequate analgesia    Post assessment: no apparent anesthetic complications and tolerated procedure well    Post vital signs: stable    Level of consciousness: awake, alert and oriented    Nausea/Vomiting: no nausea/vomiting    Complications: none    Transfer of care protocol was followed      Last vitals:   Visit Vitals  BP (!) 143/69   Pulse (!) 47   Temp 36.7 °C (98.1 °F) (Temporal)   Resp 16   Ht 5' 10" (1.778 m)   Wt 81.2 kg (179 lb)   SpO2 98%   BMI 25.68 kg/m²     "

## 2023-05-03 NOTE — ANESTHESIA PREPROCEDURE EVALUATION
05/03/2023  Jose Soto Jr. is a 77 y.o., male.      Pre-op Assessment    I have reviewed the Patient Summary Reports.     I have reviewed the Nursing Notes. I have reviewed the NPO Status.   I have reviewed the Medications.     Review of Systems  Anesthesia Hx:  No previous Anesthesia  History of prior surgery of interest to airway management or planning: Previous anesthesia: General   Social:  Social Alcohol Use    Cardiovascular:   CAD  Dysrhythmias  hyperlipidemia    Renal/:   Urinary incontinence.  ED   Hepatic/GI:   Pancreatic cyst   Neurological:  Dementia        Physical Exam  General: Well nourished, Cooperative, Alert and Oriented    Airway:  Mallampati: II / II  Mouth Opening: Normal  TM Distance: Normal  Tongue: Normal  Neck ROM: Normal ROM    Dental:  Intact        Anesthesia Plan  Type of Anesthesia, risks & benefits discussed:    Anesthesia Type: MAC  Intra-op Monitoring Plan: Standard ASA Monitors  Post Op Pain Control Plan: multimodal analgesia  Induction:  IV  Informed Consent: Informed consent signed with the Patient and all parties understand the risks and agree with anesthesia plan.  All questions answered.   ASA Score: 3  Day of Surgery Review of History & Physical: H&P Update referred to the surgeon/provider.    Ready For Surgery From Anesthesia Perspective.     .

## 2023-05-03 NOTE — ANESTHESIA RELEASE NOTE
"Anesthesia Release from PACU Note    Patient: Jose Soto     Procedure(s) Performed: Procedure(s) (LRB):  COLONOSCOPY (N/A)    Anesthesia type: MAC    Post pain: Adequate analgesia    Post assessment: no apparent anesthetic complications, tolerated procedure well and no evidence of recall    Last Vitals:   Visit Vitals  BP (!) 143/69   Pulse (!) 47   Temp 36.7 °C (98.1 °F) (Temporal)   Resp 16   Ht 5' 10" (1.778 m)   Wt 81.2 kg (179 lb)   SpO2 98%   BMI 25.68 kg/m²       Post vital signs: stable    Level of consciousness: awake, alert  and oriented    Nausea/Vomiting: no nausea/no vomiting    Complications: none    Airway Patency: patent    Respiratory: unassisted, spontaneous ventilation, room air    Cardiovascular: stable and blood pressure at baseline    Hydration: euvolemic  "

## 2023-05-03 NOTE — PROVATION PATIENT INSTRUCTIONS
Discharge Summary/Instructions after an Endoscopic Procedure  Patient Name: Jose Soto  Patient MRN: [id number]  Patient YOB: 1945  Wednesday, May 3, 2023 Maria Luisa Brown DO  Dear patient,  As a result of recent federal legislation (The Federal Cures Act), you may   receive lab or pathology results from your procedure in your [facility]   account before your physician is able to contact you. Your physician or   their representative will relay the results to you with their   recommendations at their soonest availability.  Thank you,  RESTRICTIONS:  During your procedure today, you received medications for sedation.  These   medications may affect your judgment, balance and coordination.  Therefore,   for 24 hours, you have the following restrictions:   - DO NOT drive a car, operate machinery, make legal/financial decisions,   sign important papers or drink alcohol.    ACTIVITY:  Today: no heavy lifting, straining or running due to procedural   sedation/anesthesia.  The following day: return to full activity including work.  DIET:  Eat and drink normally unless instructed otherwise.     TREATMENT FOR COMMON SIDE EFFECTS:  - Mild abdominal pain, nausea, belching, bloating or excessive gas:  rest,   eat lightly and use a heating pad.  - Sore Throat: treat with throat lozenges and/or gargle with warm salt   water.  - Because air was used during the procedure, expelling large amounts of air   from your rectum or belching is normal.  - If a bowel prep was taken, you may not have a bowel movement for 1-3 days.    This is normal.  SYMPTOMS TO WATCH FOR AND REPORT TO YOUR PHYSICIAN:  1. Abdominal pain or bloating, other than gas cramps.  2. Chest pain.  3. Back pain.  4. Signs of infection such as: chills or fever occurring within 24 hours   after the procedure.  5. Rectal bleeding, which would show as bright red, maroon, or black stools.   (A tablespoon of blood from the rectum is not serious, especially  if   hemorrhoids are present.)  6. Vomiting.  7. Weakness or dizziness.  GO DIRECTLY TO THE NEAREST EMERGENCY ROOM IF YOU HAVE ANY OF THE FOLLOWING:      Difficulty breathing              Chills and/or fever over 101 F   Persistent vomiting and/or vomiting blood   Severe abdominal pain   Severe chest pain   Black, tarry stools   Bleeding- more than one tablespoon   Any other symptom or condition that you feel may need urgent attention  Your doctor recommends these additional instructions:  If any biopsies were taken, your doctors clinic will contact you in 1 to 2   weeks with any results.  - Discharge patient to home (ambulatory).   - Patient has a contact number available for emergencies.  The signs and   symptoms of potential delayed complications were discussed with the   patient.  Return to normal activities tomorrow.  Written discharge   instructions were provided to the patient.   - Resume previous diet.   - Continue present medications.   - Return to primary care physician as previously scheduled.   - Repeat colonoscopy in 5 years for surveillance.   - Return to primary care physician.  For questions, problems or results please call your physician Maria Luisa Brown, DO at Work:  [phone]  If you have any questions about the above instructions, call the GI   department at [phone] or call the endoscopy unit at [phone]   from 7am until 3 pm.  [facility], EMERGENCY ROOM PHONE NUMBER:   [phone]  IF A COMPLICATION OR EMERGENCY SITUATION ARISES AND YOU ARE UNABLE TO REACH   YOUR PHYSICIAN - GO DIRECTLY TO THE EMERGENCY ROOM.  I have read or have had read to me these discharge instructions for my   procedure and have received a written copy.  I understand these   instructions and will follow-up with my physician if I have any questions.     __________________________________       _____________________________________  Nurse Signature                                           Patient/Designated   Responsible Party Signature  Maria Luisa Brown DO  5/3/2023 11:17:47 AM  This report has been verified and signed electronically.  Dear patient,  As a result of recent federal legislation (The Federal Cures Act), you may   receive lab or pathology results from your procedure in your [facility]   account before your physician is able to contact you. Your physician or   their representative will relay the results to you with their   recommendations at their soonest availability.  Thank you,  PROVATION

## 2023-05-03 NOTE — TELEPHONE ENCOUNTER
Called and spoke with Pt about the following:     Please arrive to [facility] ([facility] at 5:30 am on 5/04/23 for your scheduled procedure.  Address: [address] [place] [address] [place] [address] (2nd Building on left, 1st Floor Lobby)  >>>NO eating or drinking after midnight unless instructed otherwise by your Surgeon<<<

## 2023-05-03 NOTE — H&P
PRE PROCEDURE H&P    Patient Name: Jose Soto Jr.  MRN: [id number]  : 1945  Date of Procedure:  5/3/2023  Referring Physician: Lois Contreras MD  Primary Physician: Angela Tejada MD  Procedure Physician: Maria Luisa Brown DO      Planned Procedure: Colonoscopy  Diagnosis: family history of colon cancer  Chief Complaint: Same as above    HPI: Patient is an 77 y.o. male is here for the above.     Last colonoscopy:   Family history: Mother diagnosed with colon cancer in her 80s  Anticoagulation: Aspirin    Past Medical History:   Past Medical History:   Diagnosis Date    Carotid artery disease     Mild    Dyshidrotic eczema     ED (erectile dysfunction)     History of cardiac arrhythmia     Hyperlipidemia     Mild cognitive impairment     Pancreatic cyst     Shingles     Tinnitus of both ears     Urinary incontinence         Past Surgical History:  Past Surgical History:   Procedure Laterality Date    CATARACT EXTRACTION W/  INTRAOCULAR LENS IMPLANT Bilateral 2017    EYE SURGERY Bilateral 2018    Laser treatment     INGUINAL HERNIA REPAIR      TONSILLECTOMY          Home Medications:  Prior to Admission medications    Medication Sig Start Date End Date Taking? Authorizing Provider   ascorbic acid, vitamin C, (VITAMIN C) 500 MG tablet Take 500 mg by mouth once daily.   Yes Historical Provider   aspirin (ECOTRIN) 81 MG EC tablet Take 81 mg by mouth.   Yes Historical Provider   cholecalciferol, vitamin D3, 2,000 unit Cap Take 1 capsule by mouth once daily.   Yes Historical Provider   cyanocobalamin (VITAMIN B-12) 1000 MCG tablet Take 100 mcg by mouth once daily.   Yes Historical Provider   multivitamin capsule Take 2 capsules by mouth once daily.   Yes Historical Provider   rosuvastatin (CRESTOR) 5 MG tablet TAKE 1 TABLET EVERY DAY 11/15/22  Yes Angela Tejada MD   traZODone (DESYREL) 100 MG tablet TAKE 1 TABLET EVERY EVENING 22  Yes Angela Tejada MD   vitamin E acetate  "(VITAMIN E ORAL) Take 180 Units by mouth once daily.    Yes Historical Provider        Allergies:  Review of patient's allergies indicates:  No Known Allergies     Social History:   Social History     Socioeconomic History    Marital status: Single   Tobacco Use    Smoking status: Never    Smokeless tobacco: Never   Substance and Sexual Activity    Alcohol use: Yes     Alcohol/week: 6.0 - 8.0 standard drinks     Types: 6 - 8 Glasses of wine per week    Drug use: No   Social History Narrative    The patient is  and is now living with his girlfriend who is in ill health.  He has 3 adult children.  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       Family History:  Family History   Problem Relation Age of Onset    Colon cancer Mother     Heart disease Father     No Known Problems Sister     No Known Problems Sister        ROS: No acute cardiac events, no acute respiratory complaints.     Physical Exam (all patients):    BP (!) 172/76   Pulse (!) 51   Temp 97.7 °F (36.5 °C) (Temporal)   Resp 15   Ht 5' 10" (1.778 m)   Wt 81.2 kg (179 lb)   SpO2 99%   BMI 25.68 kg/m²   Lungs: Clear to auscultation bilaterally, respirations unlabored  Heart: Regular rate and rhythm, S1 and S2 normal, no obvious murmurs  Abdomen:         Soft, non-tender, bowel sounds normal, no masses, no organomegaly    Lab Results   Component Value Date    WBC 6.08 04/18/2023    MCV 88 04/18/2023    RDW 14.5 04/18/2023     04/18/2023    GLU 94 04/18/2023    BUN 12 04/18/2023     04/18/2023    K 4.8 04/18/2023     04/18/2023        SEDATION PLAN: per anesthesia      History reviewed, vital signs satisfactory, cardiopulmonary status satisfactory, sedation options, risks and plans have been discussed with the patient  All their questions were answered and the patient agrees to the sedation procedures as planned and the patient is deemed an appropriate candidate for the sedation as planned.    Procedure " explained to patient, informed consent obtained and placed in chart.    Maria Luisa Brown, DO  General Surgery  [facility]  5/3/2023

## 2023-05-03 NOTE — ANESTHESIA PREPROCEDURE EVALUATION
05/03/2023  Jose Soto Jr. is a 77 y.o., male.    Patient Active Problem List   Diagnosis    Urinary incontinence    Hyperlipidemia    Carotid artery disease    Memory changes    Pre-op evaluation    Bradycardia    Right inguinal hernia       Pre-op Assessment    I have reviewed the Patient Summary Reports.    I have reviewed the NPO Status.   I have reviewed the Medications.     Review of Systems  Anesthesia Hx:  No problems with previous Anesthesia    Social:  Non-Smoker    Hematology/Oncology:  Hematology Normal        Cardiovascular:   hyperlipidemia Carotid artery disease    Bradycardia   Pulmonary:  Pulmonary Normal    Renal/:  Renal/ Normal     Hepatic/GI:  Hepatic/GI Normal    Neurological:  Neurology Normal    Endocrine:  Endocrine Normal        Physical Exam  General: Well nourished    Airway:  Mallampati: III   Mouth Opening: Small, but > 3cm  TM Distance: Normal  Neck ROM: Normal ROM    Dental:  Intact        Anesthesia Plan  Type of Anesthesia, risks & benefits discussed:    Anesthesia Type: Gen ETT  Intra-op Monitoring Plan: Standard ASA Monitors  Post Op Pain Control Plan: multimodal analgesia  Induction:  IV  Airway Plan: Video, Post-Induction  Informed Consent: Informed consent signed with the Patient and all parties understand the risks and agree with anesthesia plan.  All questions answered.   ASA Score: 2  Anesthesia Plan Notes: Video laryngoscope    Ready For Surgery From Anesthesia Perspective.     .      Chemistry        Component Value Date/Time     04/18/2023 1022    K 4.8 04/18/2023 1022     04/18/2023 1022    CO2 28 04/18/2023 1022    BUN 12 04/18/2023 1022    CREATININE 1.0 04/18/2023 1022    GLU 94 04/18/2023 1022        Component Value Date/Time    CALCIUM 9.6 04/18/2023 1022    ALKPHOS 65 04/18/2023 1022    AST 28 04/18/2023 1022    ALT 22 04/18/2023  1022    BILITOT 0.9 04/18/2023 1022    ESTGFRAFRICA >60.0 09/09/2021 1648    EGFRNONAA >60.0 09/09/2021 1648        Lab Results   Component Value Date    WBC 6.08 04/18/2023    HGB 15.0 04/18/2023    HCT 46.5 04/18/2023    MCV 88 04/18/2023     04/18/2023     Sinus bradycardia with 1st degree A-V block   Abnormal ECG   No previous ECGs available   Confirmed by LUIS ZAMUDIO MD ([id number]) on 4/18/2023 4:23:00 PM

## 2023-05-04 ENCOUNTER — TELEPHONE (OUTPATIENT)
Dept: SURGERY | Facility: CLINIC | Age: 78
End: 2023-05-04
Payer: MEDICARE

## 2023-05-04 ENCOUNTER — ANESTHESIA (OUTPATIENT)
Dept: SURGERY | Facility: HOSPITAL | Age: 78
End: 2023-05-04
Payer: MEDICARE

## 2023-05-04 ENCOUNTER — HOSPITAL ENCOUNTER (OUTPATIENT)
Facility: HOSPITAL | Age: 78
Discharge: HOME | End: 2023-05-04
Attending: SURGERY | Admitting: SURGERY
Payer: MEDICARE

## 2023-05-04 VITALS
DIASTOLIC BLOOD PRESSURE: 72 MMHG | BODY MASS INDEX: 25.62 KG/M2 | HEIGHT: 70 IN | RESPIRATION RATE: 17 BRPM | SYSTOLIC BLOOD PRESSURE: 150 MMHG | OXYGEN SATURATION: 99 % | WEIGHT: 179 LBS | HEART RATE: 48 BPM | TEMPERATURE: 98 F

## 2023-05-04 DIAGNOSIS — N50.89 TESTICULAR MASS: Primary | ICD-10-CM

## 2023-05-04 DIAGNOSIS — K40.90 RIGHT INGUINAL HERNIA: ICD-10-CM

## 2023-05-04 PROCEDURE — 63600175 PHARM REV CODE 636 W HCPCS: Mod: HCNC | Performed by: ANESTHESIOLOGY

## 2023-05-04 PROCEDURE — 37000009 HC ANESTHESIA EA ADD 15 MINS: Performed by: SURGERY

## 2023-05-04 PROCEDURE — 25000003 PHARM REV CODE 250: Mod: HCNC | Performed by: NURSE ANESTHETIST, CERTIFIED REGISTERED

## 2023-05-04 PROCEDURE — 37000008 HC ANESTHESIA 1ST 15 MINUTES: Performed by: SURGERY

## 2023-05-04 PROCEDURE — 49651 PR LAP,INGUINAL HERNIA REPR,RECUR: ICD-10-PCS | Mod: RT,,, | Performed by: SURGERY

## 2023-05-04 PROCEDURE — 71000016 HC POSTOP RECOV ADDL HR: Performed by: SURGERY

## 2023-05-04 PROCEDURE — 63600175 PHARM REV CODE 636 W HCPCS: Mod: HCNC | Performed by: SURGERY

## 2023-05-04 PROCEDURE — 36000710: Performed by: SURGERY

## 2023-05-04 PROCEDURE — 36000711: Performed by: SURGERY

## 2023-05-04 PROCEDURE — 49651 LAP ING HERNIA REPAIR RECUR: CPT | Mod: AS,RT,,

## 2023-05-04 PROCEDURE — C1781 MESH (IMPLANTABLE): HCPCS | Performed by: SURGERY

## 2023-05-04 PROCEDURE — 71000033 HC RECOVERY, INTIAL HOUR: Performed by: SURGERY

## 2023-05-04 PROCEDURE — 49651 PR LAP,INGUINAL HERNIA REPR,RECUR: ICD-10-PCS | Mod: AS,RT,,

## 2023-05-04 PROCEDURE — 49651 LAP ING HERNIA REPAIR RECUR: CPT | Mod: RT,,, | Performed by: SURGERY

## 2023-05-04 PROCEDURE — 27201423 OPTIME MED/SURG SUP & DEVICES STERILE SUPPLY: Performed by: SURGERY

## 2023-05-04 PROCEDURE — 25000003 PHARM REV CODE 250: Mod: HCNC | Performed by: SURGERY

## 2023-05-04 PROCEDURE — 71000015 HC POSTOP RECOV 1ST HR: Performed by: SURGERY

## 2023-05-04 PROCEDURE — 63600175 PHARM REV CODE 636 W HCPCS: Mod: HCNC | Performed by: NURSE ANESTHETIST, CERTIFIED REGISTERED

## 2023-05-04 PROCEDURE — 25000003 PHARM REV CODE 250: Mod: HCNC | Performed by: ANESTHESIOLOGY

## 2023-05-04 DEVICE — MESH PROGRIP LAP 12X16CM RIGHT: Type: IMPLANTABLE DEVICE | Site: ABDOMEN | Status: FUNCTIONAL

## 2023-05-04 RX ORDER — HYDROMORPHONE HYDROCHLORIDE 2 MG/ML
0.2 INJECTION, SOLUTION INTRAMUSCULAR; INTRAVENOUS; SUBCUTANEOUS EVERY 5 MIN PRN
Status: DISCONTINUED | OUTPATIENT
Start: 2023-05-04 | End: 2023-05-04 | Stop reason: HOSPADM

## 2023-05-04 RX ORDER — ONDANSETRON 2 MG/ML
INJECTION INTRAMUSCULAR; INTRAVENOUS
Status: DISCONTINUED | OUTPATIENT
Start: 2023-05-04 | End: 2023-05-04

## 2023-05-04 RX ORDER — ROCURONIUM BROMIDE 10 MG/ML
INJECTION, SOLUTION INTRAVENOUS
Status: DISCONTINUED | OUTPATIENT
Start: 2023-05-04 | End: 2023-05-04

## 2023-05-04 RX ORDER — IBUPROFEN 600 MG/1
600 TABLET ORAL EVERY 6 HOURS PRN
Qty: 25 TABLET | Refills: 0 | Status: SHIPPED | OUTPATIENT
Start: 2023-05-04 | End: 2023-05-22 | Stop reason: ALTCHOICE

## 2023-05-04 RX ORDER — KETOROLAC TROMETHAMINE 30 MG/ML
15 INJECTION, SOLUTION INTRAMUSCULAR; INTRAVENOUS EVERY 8 HOURS PRN
Status: DISCONTINUED | OUTPATIENT
Start: 2023-05-04 | End: 2023-05-04 | Stop reason: HOSPADM

## 2023-05-04 RX ORDER — BUPIVACAINE HYDROCHLORIDE 2.5 MG/ML
INJECTION, SOLUTION EPIDURAL; INFILTRATION; INTRACAUDAL
Status: DISCONTINUED | OUTPATIENT
Start: 2023-05-04 | End: 2023-05-04 | Stop reason: HOSPADM

## 2023-05-04 RX ORDER — HYDROCODONE BITARTRATE AND ACETAMINOPHEN 7.5; 325 MG/1; MG/1
1 TABLET ORAL
Qty: 25 TABLET | Refills: 0 | Status: SHIPPED | OUTPATIENT
Start: 2023-05-04 | End: 2023-05-22 | Stop reason: ALTCHOICE

## 2023-05-04 RX ORDER — ONDANSETRON 2 MG/ML
4 INJECTION INTRAMUSCULAR; INTRAVENOUS DAILY PRN
Status: DISCONTINUED | OUTPATIENT
Start: 2023-05-04 | End: 2023-05-04 | Stop reason: HOSPADM

## 2023-05-04 RX ORDER — PROPOFOL 10 MG/ML
VIAL (ML) INTRAVENOUS
Status: DISCONTINUED | OUTPATIENT
Start: 2023-05-04 | End: 2023-05-04

## 2023-05-04 RX ORDER — LIDOCAINE HYDROCHLORIDE 20 MG/ML
INJECTION INTRAVENOUS
Status: DISCONTINUED | OUTPATIENT
Start: 2023-05-04 | End: 2023-05-04

## 2023-05-04 RX ORDER — OXYCODONE AND ACETAMINOPHEN 5; 325 MG/1; MG/1
1 TABLET ORAL
Status: DISCONTINUED | OUTPATIENT
Start: 2023-05-04 | End: 2023-05-04 | Stop reason: HOSPADM

## 2023-05-04 RX ORDER — FENTANYL CITRATE 50 UG/ML
INJECTION, SOLUTION INTRAMUSCULAR; INTRAVENOUS
Status: DISCONTINUED | OUTPATIENT
Start: 2023-05-04 | End: 2023-05-04

## 2023-05-04 RX ORDER — DOCUSATE SODIUM 100 MG/1
100 CAPSULE, LIQUID FILLED ORAL 2 TIMES DAILY
Qty: 60 CAPSULE | Refills: 1 | Status: SHIPPED | OUTPATIENT
Start: 2023-05-04 | End: 2023-09-21

## 2023-05-04 RX ORDER — DEXAMETHASONE SODIUM PHOSPHATE 4 MG/ML
INJECTION, SOLUTION INTRA-ARTICULAR; INTRALESIONAL; INTRAMUSCULAR; INTRAVENOUS; SOFT TISSUE
Status: DISCONTINUED | OUTPATIENT
Start: 2023-05-04 | End: 2023-05-04

## 2023-05-04 RX ORDER — METHOCARBAMOL 500 MG/1
1000 TABLET, FILM COATED ORAL 4 TIMES DAILY PRN
Qty: 35 TABLET | Refills: 0 | Status: SHIPPED | OUTPATIENT
Start: 2023-05-04 | End: 2023-05-22 | Stop reason: ALTCHOICE

## 2023-05-04 RX ORDER — HYDRALAZINE HYDROCHLORIDE 20 MG/ML
10 INJECTION INTRAMUSCULAR; INTRAVENOUS ONCE AS NEEDED
Status: DISCONTINUED | OUTPATIENT
Start: 2023-05-04 | End: 2023-05-04 | Stop reason: HOSPADM

## 2023-05-04 RX ORDER — SUCCINYLCHOLINE CHLORIDE 20 MG/ML
INJECTION INTRAMUSCULAR; INTRAVENOUS
Status: DISCONTINUED | OUTPATIENT
Start: 2023-05-04 | End: 2023-05-04

## 2023-05-04 RX ORDER — CEFAZOLIN SODIUM 2 G/50ML
2 SOLUTION INTRAVENOUS ONCE
Status: COMPLETED | OUTPATIENT
Start: 2023-05-04 | End: 2023-05-04

## 2023-05-04 RX ORDER — NEOSTIGMINE METHYLSULFATE 1 MG/ML
INJECTION, SOLUTION INTRAVENOUS
Status: DISCONTINUED | OUTPATIENT
Start: 2023-05-04 | End: 2023-05-04

## 2023-05-04 RX ADMIN — FENTANYL CITRATE 50 MCG: 50 INJECTION, SOLUTION INTRAMUSCULAR; INTRAVENOUS at 10:05

## 2023-05-04 RX ADMIN — PROPOFOL 150 MG: 10 INJECTION, EMULSION INTRAVENOUS at 07:05

## 2023-05-04 RX ADMIN — GLYCOPYRROLATE 0.8 MG: 0.2 INJECTION, SOLUTION INTRAMUSCULAR; INTRAVENOUS at 09:05

## 2023-05-04 RX ADMIN — ONDANSETRON 4 MG: 2 INJECTION INTRAMUSCULAR; INTRAVENOUS at 09:05

## 2023-05-04 RX ADMIN — SODIUM CHLORIDE, POTASSIUM CHLORIDE, SODIUM LACTATE AND CALCIUM CHLORIDE: 600; 310; 30; 20 INJECTION, SOLUTION INTRAVENOUS at 06:05

## 2023-05-04 RX ADMIN — CEFAZOLIN SODIUM 2 G: 2 SOLUTION INTRAVENOUS at 07:05

## 2023-05-04 RX ADMIN — SUCCINYLCHOLINE CHLORIDE 140 MG: 20 INJECTION, SOLUTION INTRAMUSCULAR; INTRAVENOUS; PARENTERAL at 07:05

## 2023-05-04 RX ADMIN — OXYCODONE HYDROCHLORIDE AND ACETAMINOPHEN 1 TABLET: 5; 325 TABLET ORAL at 11:05

## 2023-05-04 RX ADMIN — FENTANYL CITRATE 50 MCG: 50 INJECTION, SOLUTION INTRAMUSCULAR; INTRAVENOUS at 09:05

## 2023-05-04 RX ADMIN — GLYCOPYRROLATE 0.2 MG: 0.2 INJECTION, SOLUTION INTRAMUSCULAR; INTRAVENOUS at 07:05

## 2023-05-04 RX ADMIN — NEOSTIGMINE METHYLSULFATE 5 MG: 1 INJECTION INTRAVENOUS at 09:05

## 2023-05-04 RX ADMIN — SODIUM CHLORIDE, POTASSIUM CHLORIDE, SODIUM LACTATE AND CALCIUM CHLORIDE: 600; 310; 30; 20 INJECTION, SOLUTION INTRAVENOUS at 09:05

## 2023-05-04 RX ADMIN — FENTANYL CITRATE 50 MCG: 50 INJECTION, SOLUTION INTRAMUSCULAR; INTRAVENOUS at 07:05

## 2023-05-04 RX ADMIN — ROCURONIUM BROMIDE 30 MG: 10 SOLUTION INTRAVENOUS at 09:05

## 2023-05-04 RX ADMIN — KETOROLAC TROMETHAMINE 15 MG: 30 INJECTION, SOLUTION INTRAMUSCULAR; INTRAVENOUS at 11:05

## 2023-05-04 RX ADMIN — DEXAMETHASONE SODIUM PHOSPHATE 8 MG: 4 INJECTION, SOLUTION INTRA-ARTICULAR; INTRALESIONAL; INTRAMUSCULAR; INTRAVENOUS; SOFT TISSUE at 09:05

## 2023-05-04 RX ADMIN — ROCURONIUM BROMIDE 45 MG: 10 SOLUTION INTRAVENOUS at 07:05

## 2023-05-04 RX ADMIN — ROCURONIUM BROMIDE 5 MG: 10 SOLUTION INTRAVENOUS at 07:05

## 2023-05-04 RX ADMIN — LIDOCAINE HYDROCHLORIDE 100 MG: 20 INJECTION INTRAVENOUS at 07:05

## 2023-05-04 NOTE — PLAN OF CARE
Gave home care instructions to patient and fiance, verbalized understanding.   All questions answered to the satisfaction of both.

## 2023-05-04 NOTE — TELEPHONE ENCOUNTER
Called patient in regarding to Ultrasound appointment. No answer. Unable to leave voicemail. Will try again later.      ----- Message from Maria Luisa Brown DO sent at 5/4/2023 11:59 AM CDT -----  I ordered a non-emergent ultrasound for this patient if someone wouldn't mind scheduling it for him. Thanks!

## 2023-05-04 NOTE — TELEPHONE ENCOUNTER
Called and spoke with wife regarding Ultrasound appointment. Informed wife patient is scheduled on May 29th at 11:00 am at the [facility] off of [address]. Patient's wife verbalized understanding    ----- Message from Maria Luisa Brown DO sent at 5/4/2023  1:38 PM CDT -----  He just had surgery, so probably need to contact wife. Ultrasound isn't urgent, but would be great if can be done within the next few weeks.  ----- Message -----  From: Gayatri Rouse MA  Sent: 5/4/2023   1:12 PM CDT  To: Maria Luisa Brown DO    Tried to contact patient to schedule Ultrasound. Voicemail box is full.  When would you like patient to get the imaging done?      ----- Message -----  From: Maria Luisa Brown DO  Sent: 5/4/2023  12:00 PM CDT  To: Kwasi Vera MA, Gayatri Rouse MA    I ordered a non-emergent ultrasound for this patient if someone wouldn't mind scheduling it for him. Thanks!

## 2023-05-04 NOTE — H&P
[facility] -   General Surgery History & Physical    SUBJECTIVE:     History of Present Illness:  Patient is a 77 y.o. male presents with a right inguinal hernia which he first noted about a month ago. He states the hernia bulges out and he has to push it back in. It does not cause him severe pain,  but it is uncomfortable. He is quite active and enjoys running and exercising--he worries that this will start to interfere with that.  Thinks he might have had a left inguinal hernia repair in the past, but he is unsure.      Review of patient's allergies indicates:  No Known Allergies    Current Outpatient Medications   Medication Sig Dispense Refill    ascorbic acid, vitamin C, (VITAMIN C) 500 MG tablet Take 500 mg by mouth once daily.      aspirin (ECOTRIN) 81 MG EC tablet Take 81 mg by mouth.      cholecalciferol, vitamin D3, 2,000 unit Cap Take 1 capsule by mouth once daily.      cyanocobalamin (VITAMIN B-12) 1000 MCG tablet Take 100 mcg by mouth once daily.      meloxicam (MOBIC) 15 MG tablet Take 1 tablet (15 mg total) by mouth once daily. 30 tablet 2    multivitamin capsule Take 2 capsules by mouth once daily.      rosuvastatin (CRESTOR) 5 MG tablet TAKE 1 TABLET EVERY DAY 90 tablet 0    traZODone (DESYREL) 100 MG tablet TAKE 1 TABLET EVERY EVENING 90 tablet 0    vitamin E acetate (VITAMIN E ORAL) Take 180 Units by mouth once daily.        No current facility-administered medications for this visit.       Past Medical History:   Diagnosis Date    Carotid artery disease     Mild    Dyshidrotic eczema     ED (erectile dysfunction)     History of cardiac arrhythmia     Hyperlipidemia     Mild cognitive impairment     Pancreatic cyst     Shingles     Tinnitus of both ears     Urinary incontinence      Past Surgical History:   Procedure Laterality Date    CATARACT EXTRACTION W/  INTRAOCULAR LENS IMPLANT Bilateral 08/2017    EYE SURGERY Bilateral 05/2018    Laser treatment     INGUINAL HERNIA REPAIR       TONSILLECTOMY       Family History   Problem Relation Age of Onset    Colon cancer Mother     Heart disease Father     No Known Problems Sister     No Known Problems Sister      Social History     Tobacco Use    Smoking status: Never    Smokeless tobacco: Never   Substance Use Topics    Alcohol use: Yes     Alcohol/week: 6.0 - 8.0 standard drinks     Types: 6 - 8 Glasses of wine per week    Drug use: No        Review of Systems:  Review of Systems   Constitutional:  Negative for fever.   Gastrointestinal:  Negative for abdominal pain and constipation.     OBJECTIVE:     Vital Signs (Most Recent)  Pulse: (!) 57 (03/07/23 1421)  BP: 127/77 (03/07/23 1421)     80.8 kg (178 lb 2.1 oz)     Physical Exam:  Physical Exam  Vitals and nursing note reviewed.   Constitutional:       General: He is not in acute distress.     Appearance: He is not ill-appearing, toxic-appearing or diaphoretic.   HENT:      Head: Normocephalic.      Nose: Nose normal.      Mouth/Throat:      Mouth: Mucous membranes are moist.   Eyes:      General: No scleral icterus.        Right eye: No discharge.         Left eye: No discharge.      Extraocular Movements: Extraocular movements intact.   Cardiovascular:      Rate and Rhythm: Normal rate and regular rhythm.   Pulmonary:      Effort: No respiratory distress.      Breath sounds: No stridor.   Abdominal:      General: There is no distension.      Palpations: Abdomen is soft.      Tenderness: There is no abdominal tenderness.   Genitourinary:     Comments: Soft, reducible right inguinal hernia. No overlying skin changes. Possible small left inguinal hernia with valsalva.  Musculoskeletal:      Cervical back: No rigidity.   Skin:     General: Skin is warm and dry.      Coloration: Skin is not jaundiced or pale.   Neurological:      Mental Status: He is alert and oriented to person, place, and time.   Psychiatric:         Mood and Affect: Mood normal.         Behavior: Behavior normal.        Laboratory  WBC   Date Value Ref Range Status   02/28/2023 6.41 3.90 - 12.70 K/uL Final     Hemoglobin   Date Value Ref Range Status   02/28/2023 14.9 14.0 - 18.0 g/dL Final     Hematocrit   Date Value Ref Range Status   02/28/2023 48.2 40.0 - 54.0 % Final     Platelets   Date Value Ref Range Status   02/28/2023 237 150 - 450 K/uL Final     Sodium   Date Value Ref Range Status   02/28/2023 138 136 - 145 mmol/L Final     Potassium   Date Value Ref Range Status   02/28/2023 4.6 3.5 - 5.1 mmol/L Final     Creatinine   Date Value Ref Range Status   02/28/2023 1.0 0.5 - 1.4 mg/dL Final     Alkaline Phosphatase   Date Value Ref Range Status   02/28/2023 56 55 - 135 U/L Final     AST   Date Value Ref Range Status   02/28/2023 26 10 - 40 U/L Final     ALT   Date Value Ref Range Status   02/28/2023 16 10 - 44 U/L Final            ASSESSMENT/PLAN:     Jose was seen today for consult.    Diagnoses and all orders for this visit:    Inguinal hernia of right side without obstruction or gangrene  -     Case Request Operating Room:  ROBOTIC REPAIR, HERNIA, INGUINAL  -     CBC Auto Differential; Future  -     COMPREHENSIVE METABOLIC PANEL; Future  -     EKG 12-lead; Future    Right groin hernia  -     Ambulatory referral/consult to General Surgery    Preoperative testing  -     CBC Auto Differential; Future  -     COMPREHENSIVE METABOLIC PANEL; Future  -     EKG 12-lead; Future         Will plan for robotic-assisted laparoscopic right inguinal hernia repair with mesh, possible bilateral. 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  The postoperative restrictions were discussed including no heavy lifting >10 lbs, no straining/pushing/pulling, excessive bending or twisting, for at least 6 weeks otherwise there is a higher risk of recurrence.    After explaining the risks, benefits, and alternatives, patient verbalized understanding and would like to proceed with surgery. All questions were  answered to their satisfaction.      Patient expressed understanding and is in agreement.      Maria Luisa Brown, DO  General Surgery  [facility]

## 2023-05-04 NOTE — DISCHARGE SUMMARY
[facility] - Surgery (Hospital)  Discharge Note  Short Stay    Procedure(s) (LRB):  XI ROBOTIC REPAIR, HERNIA, INGUINAL (Right)      OUTCOME: Patient tolerated treatment/procedure well without complication and is now ready for discharge.    DISPOSITION: Home or Self Care    FINAL DIAGNOSIS:  <principal problem not specified>    FOLLOWUP: In clinic    DISCHARGE INSTRUCTIONS:  No discharge procedures on file.     TIME SPENT ON DISCHARGE: 10 minutes

## 2023-05-04 NOTE — TRANSFER OF CARE
"Anesthesia Transfer of Care Note    Patient: Jose Soto Jr.    Procedure(s) Performed: Procedure(s) (LRB):  XI ROBOTIC REPAIR, HERNIA, INGUINAL (Right)    Patient location: PACU    Anesthesia Type: general    Transport from OR: Transported from OR on room air with adequate spontaneous ventilation    Post pain: adequate analgesia    Post assessment: no apparent anesthetic complications and tolerated procedure well    Post vital signs: stable    Level of consciousness: awake    Nausea/Vomiting: no nausea/vomiting    Complications: none    Transfer of care protocol was followed      Last vitals:   Visit Vitals  BP (!) 175/79 (BP Location: Right arm, Patient Position: Sitting)   Pulse (!) 46   Temp 36.5 °C (97.7 °F) (Temporal)   Resp 18   Ht 5' 10" (1.778 m)   Wt 79.4 kg (175 lb)   SpO2 97%   BMI 25.11 kg/m²     "

## 2023-05-04 NOTE — OP NOTE
Jose Soto Jr.  : 1945, MRN: [id number]  Date of procedure: 2023        Procedure: DaVinci-assisted laparoscopic recurrent incarcerated right inguinal hernia repair with mesh    Pre-procedure diagnosis: Right inguinal hernia  Post-procedure diagnosis: Incarcerated, recurrent right inguinal hernia  Surgeon: Maria Luisa Brown DO  Assistant: Mirela Doherty PA-C  EBL: 20 mL  Implants/Drains: Progrip 16 x 12 cm laparoscopic inguinal mesh  Specimen: None  Complications: None apparent    Significant findings: Right indirect inguinal hernia containing incarcerated small bowel. Prolene sutures found from a previous repair.    Indications for procedure: See H&P. Possible bilateral repair was discussed if bilateral hernias visualized. After explaining the risks, benefits, and alternatives, the patient verbalized understanding and informed written consent was obtained.    Description of procedure: The patient was brought to the OR and placed in the supine position. After general anesthesia was induced by the Anesthesia Department, a gerardo catheter was placed and the abdomen and groin were prepped and draped in usual sterile fashion. A time out was taken to identify the correct patient, correct procedure, and correct anatomical site; all parties were in agreement.  Local anesthetic was injected into the skin. A Veress needle was inserted at Collado's point and the abdomen was insufflated to 15 mm Hg. An 8 mm supraumbilical incision was made and the 8 mm robotic trochar was inserted under direct visualization. Next, two 8 mm trochars were inserted in the right and left mid abdomen under direct visualization. The patient was placed in Trendelenburg and the [employer] robot was docked.  There was a loop of small bowel that was adhered to the right inguinal hernia via adhesions. The adhesions were carefully lysed using the robotic marco. A peritoneal flap was created by incising the peritoneum 6 cm above the defect  from the midline and extending it laterally with the electrocautery marco. The flap was dissected medially to expose the pubic tubercle. The hernia sac was meticulously dissected free from the cord structures until the entire sac was reduced. There was quite a bit of scar tissue from the hernia sac and prolene sutures were found indicating a prior repair in this area. Prolene sutures were also seen on the left. The vas and cord vessels were identified and protected. The mesh was inserted and positioned over the defect, ensuring ample coverage. The mesh was sutured medially to the pubic tubercle with a 3-0 vicryl suture and also sutured in place superiorly to the abdominal wall to secure it. Good hemostasis was observed and the indirect, direct, and femoral spaces were amply covered. The peritoneal flap was then closed with a running 2-0 Strattifix suture.  The abdomen was desufflated. The fascia of the port site incisions were closed with interrupted 2-0 vicryl. The port site incisions were closed with 4-0 monocryl in the subcuticular layer and Dermabond was applied on top of the skin. The gerardo catheter was removed. Both testicles were confirmed within the scrotum.    All sponge and instrument counts were deemed correct. The patient appeared to tolerate the procedure well and there were no apparent complications. The patient was transported to PACU in stable condition.    Maria Luisa Brown,   General Surgery  [facility]  5/4/2023

## 2023-05-04 NOTE — PATIENT INSTRUCTIONS
Discharge Instructions    Hygiene and incision care:   You may shower tomorrow but do not soak such as in a bathtub or pool. Your incisions are closed with absorbable sutures and there is surgical glue on top. The glue will eventually flake off on its own. Do not scrub your incisions, just allow warm soapy water to run over them. Do not apply neosporin, hydrogen peroxide, or alcohol on your incisions.   If you develop fevers, worsening redness and/or pus-like drainage, call the office immediately.    Pain control:   You may take Tylenol (650 mg every 4 hours) and ibuprofen (600 mg every 6 hours) as well as alternate heat and cold packs for pain and swelling. Take ibuprofen with food as it can cause stomach upset and ulcers. Do not take ibuprofen if you have an increased risk of bleeding, history of stomach ulcers, are already taking an NSAID, or have kidney issues.   If taking narcotic pain medication, such as Norco (hydrocodone-acetaminophen) or Percocet (oxycodone-acetaminophen), do not drink alcohol or drive. Each Norco and Percocet tablet contains 325 mg of Tylenol; do not take more than 4000 mg of Tylenol per day. Narcotic pain medications can be constipating so be sure to drink plenty of water and take an over the counter stool softener such as colace (100 mg twice a day) or miralax (17 g once a day).    Activity:   No heavy lifting >10 lbs or sexual activity for 6 weeks while your incisions are healing as it might result in a hernia. Avoid straining, pushing, and pulling. It is okay to walk and slowly go up and down stairs. Avoid driving for at least 3 days or longer if taking narcotic pain medicine.   Make sure to do leg and ankle exercises and take deep breaths frequently to avoid developing blood clots or pneumonia.    Diet:   You may resume your regular diet. Some people find it best to stick to soft, bland, and easily digestible foods for the first couple of days while the anesthesia is leaving their  system or if they're taking narcotic pain medicine to avoid nausea and vomiting. Be sure to eat good, nutritious foods such as vegetables and lean proteins to give your body the nutrients it needs to heal. I recommend also taking vitamin C as this can aid with wound healing.    For any questions or concerns, please do not hesitate to contact the office any time at [phone] or send me a [employer] message.

## 2023-05-04 NOTE — ANESTHESIA POSTPROCEDURE EVALUATION
Anesthesia Post Evaluation    Patient: Jose Soto     Procedure(s) Performed: Procedure(s) (LRB):  XI ROBOTIC REPAIR, HERNIA, INGUINAL (Right)    Final Anesthesia Type: general      Patient location during evaluation: PACU  Patient participation: Yes- Able to Participate  Level of consciousness: awake  Post-procedure vital signs: reviewed and stable  Pain management: adequate  Airway patency: patent    PONV status at discharge: No PONV  Anesthetic complications: no      Cardiovascular status: hemodynamically stable  Respiratory status: unassisted  Hydration status: euvolemic  Follow-up not needed.          Vitals Value Taken Time   /83 05/04/23 1100   Temp 36.2 °C (97.2 °F) 05/04/23 1018   Pulse 60 05/04/23 1103   Resp 15 05/04/23 1127   SpO2 97 % 05/04/23 1103   Vitals shown include unvalidated device data.      Event Time   Out of Recovery 11:06:02         Pain/Joss Score: Pain Rating Prior to Med Admin: 6 (5/4/2023 11:28 AM)  Joss Score: 10 (5/4/2023 11:06 AM)

## 2023-05-04 NOTE — ANESTHESIA PROCEDURE NOTES
Intubation    Date/Time: 5/4/2023 7:06 AM  Performed by: Ankit Arndt CRNA  Authorized by: Hussain Bah II, MD     Intubation:     Induction:  Intravenous    Intubated:  Postinduction    Mask Ventilation:  Easy with oral airway    Attempts:  1    Attempted By:  CRNA    Method of Intubation:  Video laryngoscopy    Blade:  Bonner 3    Laryngeal View Grade: Grade I - full view of cords      Difficult Airway Encountered?: No      Complications:  None    Airway Device:  Oral endotracheal tube    Airway Device Size:  8.0    Style/Cuff Inflation:  Cuffed (inflated to minimal occlusive pressure)    Tube secured:  21    Secured at:  The lips    Placement Verified By:  Capnometry    Complicating Factors:  Small mouth and retrognathia    Findings Post-Intubation:  BS equal bilateral and atraumatic/condition of teeth unchanged

## 2023-05-05 VITALS
HEART RATE: 60 BPM | RESPIRATION RATE: 21 BRPM | TEMPERATURE: 98 F | WEIGHT: 175 LBS | HEIGHT: 70 IN | BODY MASS INDEX: 25.05 KG/M2 | OXYGEN SATURATION: 95 % | DIASTOLIC BLOOD PRESSURE: 89 MMHG | SYSTOLIC BLOOD PRESSURE: 168 MMHG

## 2023-05-08 LAB
FINAL PATHOLOGIC DIAGNOSIS: NORMAL
Lab: NORMAL

## 2023-05-10 ENCOUNTER — OFFICE VISIT (OUTPATIENT)
Dept: DERMATOLOGY | Facility: CLINIC | Age: 78
End: 2023-05-10
Payer: MEDICARE

## 2023-05-10 DIAGNOSIS — R23.8 VENOUS LAKE: Primary | ICD-10-CM

## 2023-05-10 DIAGNOSIS — B07.9 VERRUCA VULGARIS: ICD-10-CM

## 2023-05-10 PROCEDURE — 1126F AMNT PAIN NOTED NONE PRSNT: CPT | Mod: CPTII,S$GLB,, | Performed by: STUDENT IN AN ORGANIZED HEALTH CARE EDUCATION/TRAINING PROGRAM

## 2023-05-10 PROCEDURE — 3288F FALL RISK ASSESSMENT DOCD: CPT | Mod: CPTII,S$GLB,, | Performed by: STUDENT IN AN ORGANIZED HEALTH CARE EDUCATION/TRAINING PROGRAM

## 2023-05-10 PROCEDURE — 1126F PR PAIN SEVERITY QUANTIFIED, NO PAIN PRESENT: ICD-10-PCS | Mod: CPTII,S$GLB,, | Performed by: STUDENT IN AN ORGANIZED HEALTH CARE EDUCATION/TRAINING PROGRAM

## 2023-05-10 PROCEDURE — 1101F PR PT FALLS ASSESS DOC 0-1 FALLS W/OUT INJ PAST YR: ICD-10-PCS | Mod: CPTII,S$GLB,, | Performed by: STUDENT IN AN ORGANIZED HEALTH CARE EDUCATION/TRAINING PROGRAM

## 2023-05-10 PROCEDURE — 99999 PR PBB SHADOW E&M-EST. PATIENT-LVL III: ICD-10-PCS | Mod: PBBFAC,,, | Performed by: STUDENT IN AN ORGANIZED HEALTH CARE EDUCATION/TRAINING PROGRAM

## 2023-05-10 PROCEDURE — 1160F RVW MEDS BY RX/DR IN RCRD: CPT | Mod: CPTII,S$GLB,, | Performed by: STUDENT IN AN ORGANIZED HEALTH CARE EDUCATION/TRAINING PROGRAM

## 2023-05-10 PROCEDURE — 99202 OFFICE O/P NEW SF 15 MIN: CPT | Mod: S$GLB,,, | Performed by: STUDENT IN AN ORGANIZED HEALTH CARE EDUCATION/TRAINING PROGRAM

## 2023-05-10 PROCEDURE — 1159F PR MEDICATION LIST DOCUMENTED IN MEDICAL RECORD: ICD-10-PCS | Mod: CPTII,S$GLB,, | Performed by: STUDENT IN AN ORGANIZED HEALTH CARE EDUCATION/TRAINING PROGRAM

## 2023-05-10 PROCEDURE — 1160F PR REVIEW ALL MEDS BY PRESCRIBER/CLIN PHARMACIST DOCUMENTED: ICD-10-PCS | Mod: CPTII,S$GLB,, | Performed by: STUDENT IN AN ORGANIZED HEALTH CARE EDUCATION/TRAINING PROGRAM

## 2023-05-10 PROCEDURE — 99202 PR OFFICE/OUTPT VISIT, NEW, LEVL II, 15-29 MIN: ICD-10-PCS | Mod: S$GLB,,, | Performed by: STUDENT IN AN ORGANIZED HEALTH CARE EDUCATION/TRAINING PROGRAM

## 2023-05-10 PROCEDURE — 1101F PT FALLS ASSESS-DOCD LE1/YR: CPT | Mod: CPTII,S$GLB,, | Performed by: STUDENT IN AN ORGANIZED HEALTH CARE EDUCATION/TRAINING PROGRAM

## 2023-05-10 PROCEDURE — 99999 PR PBB SHADOW E&M-EST. PATIENT-LVL III: CPT | Mod: PBBFAC,,, | Performed by: STUDENT IN AN ORGANIZED HEALTH CARE EDUCATION/TRAINING PROGRAM

## 2023-05-10 PROCEDURE — 3288F PR FALLS RISK ASSESSMENT DOCUMENTED: ICD-10-PCS | Mod: CPTII,S$GLB,, | Performed by: STUDENT IN AN ORGANIZED HEALTH CARE EDUCATION/TRAINING PROGRAM

## 2023-05-10 PROCEDURE — 1159F MED LIST DOCD IN RCRD: CPT | Mod: CPTII,S$GLB,, | Performed by: STUDENT IN AN ORGANIZED HEALTH CARE EDUCATION/TRAINING PROGRAM

## 2023-05-10 RX ORDER — ROSUVASTATIN CALCIUM 5 MG/1
TABLET, COATED ORAL
Qty: 90 TABLET | Refills: 0 | Status: SHIPPED | OUTPATIENT
Start: 2023-05-10 | End: 2023-12-17

## 2023-05-10 NOTE — PROGRESS NOTES
Patient Information  Name: Jose Soto Jr.  : 1945  MRN: [id number]     Referring Physician:  Dr. Trujillo   Primary Care Physician:  Dr. Angela Tejada MD   Date of Visit: 05/10/2023      Subjective:       Jose Soto Jr. is a 77 y.o. male who presents for   Chief Complaint   Patient presents with    skin lesion      On face and mouth. X months. Tx none.      HPI  Patient with new complaint of lesion(s)  Location: face, mouth  Duration: 6+ months  Symptoms: none  Relieving factors/Previous treatments: none    Patient was last seen:Visit date not found     Prior notes by myself reviewed.   Clinical documentation obtained by nursing staff reviewed.    Review of Systems   Skin:  Negative for itching and rash.      Objective:    Physical Exam   Constitutional: He appears well-developed and well-nourished. No distress.   Neurological: He is alert and oriented to person, place, and time. He is not disoriented.   Psychiatric: He has a normal mood and affect.   Skin:   Areas Examined (abnormalities noted in diagram):   Head / Face Inspection Performed            Diagram Legend     Erythematous scaling macule/papule c/w actinic keratosis       Vascular papule c/w angioma      Pigmented verrucoid papule/plaque c/w seborrheic keratosis      Yellow umbilicated papule c/w sebaceous hyperplasia      Irregularly shaped tan macule c/w lentigo     1-2 mm smooth white papules consistent with Milia      Movable subcutaneous cyst with punctum c/w epidermal inclusion cyst      Subcutaneous movable cyst c/w pilar cyst      Firm pink to brown papule c/w dermatofibroma      Pedunculated fleshy papule(s) c/w skin tag(s)      Evenly pigmented macule c/w junctional nevus     Mildly variegated pigmented, slightly irregular-bordered macule c/w mildly atypical nevus      Flesh colored to evenly pigmented papule c/w intradermal nevus       Pink pearly papule/plaque c/w basal cell carcinoma      Erythematous hyperkeratotic  cursted plaque c/w SCC      Surgical scar with no sign of skin cancer recurrence      Open and closed comedones      Inflammatory papules and pustules      Verrucoid papule consistent consistent with wart     Erythematous eczematous patches and plaques     Dystrophic onycholytic nail with subungual debris c/w onychomycosis     Umbilicated papule    Erythematous-base heme-crusted tan verrucoid plaque consistent with inflamed seborrheic keratosis     Erythematous Silvery Scaling Plaque c/w Psoriasis     See annotation      No images are attached to the encounter or orders placed in the encounter.    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   Reassurance given to patient. No treatment necessary.     Verruca vulgaris  -      - minor problem and chronic.   Reassurance given to patient. No treatment necessary.              LOS NUMBER AND COMPLEXITY OF PROBLEMS    COMPLEXITY OF DATA RISK TOTAL TIME (m)   31266  63100 [] 1 self-limited or minor problem [x] Minimal to none [x] No treatment recommended or patient to monitor 15-29  10-19   46879  57472 Low  [] 2 or > self limited or minor problems  [] 1 stable chronic illness  [] 1 acute, uncomplicated illness or injury Limited (2)  [] Prior external notes from each unique source  [] Review result of each unique test  [] Order each unique test []  Low  OTC medications, minor skin biopsy 30-44 20-29   61451  84297 Moderate  []  1 or > chronic illness with progression, exacerbation or SE of treatment  [x]  2 or more stable chronic illnesses  []  1 acute illness with systemic symptoms  []  1 acute complicated injury  []  1 undiagnosed new problem with uncertain prognosis Moderate (1/3 below)  []  3 or more data items        *Now includes assessment requiring independent historian  []  Independent interpretation of a test  []  Discuss management/test with  another provider Moderate  []  Prescription drug mgmt  []  Minor surgery with risk discussed  []  Mgmt limited by social determinates 45-59  30-39   21845  74187 High  []  1 or more chronic illness with severe exacerbation, progression or SE of treatment  []  1 acute or chronic illness/injury that poses a threat to life or bodily function Extensive (2/3 below)  []  3 or more data items        *Now includes assessment requiring independent historian.  []  Independent interpretation of a test  []  Discuss management/test with another provider High  []  Major surgery with risk discussed  []  Drug therapy requiring intensive monitoring for toxicity  []  Hospitalization  []  Decision for DNR 60-74  40-54      No follow-ups on file.    Celia Camp MD, FAAD  [facility]

## 2023-05-10 NOTE — PATIENT INSTRUCTIONS
Venous lake on lip   - minor problem and chronic. Benign.   Reassurance given to patient. No treatment necessary.     Verruca vulgaris (wart) on left upper eyebrow  -      - minor problem and chronic. Benign.  Reassurance given to patient. No treatment necessary.

## 2023-05-10 NOTE — TELEPHONE ENCOUNTER
Refill Decision Note   Jose Charles  is requesting a refill authorization.  Brief Assessment and Rationale for Refill:  Approve     Medication Therapy Plan:       Medication Reconciliation Completed: No   Comments:     No Care Gaps recommended.     Note composed:1:17 PM 05/10/2023

## 2023-05-10 NOTE — TELEPHONE ENCOUNTER
No care due was identified.  [facility] Embedded Care Due Messages. Reference number: [id number].   5/10/2023 10:38:24 AM CDT

## 2023-05-22 ENCOUNTER — OFFICE VISIT (OUTPATIENT)
Dept: SURGERY | Facility: CLINIC | Age: 78
End: 2023-05-22
Payer: MEDICARE

## 2023-05-22 VITALS
DIASTOLIC BLOOD PRESSURE: 77 MMHG | SYSTOLIC BLOOD PRESSURE: 139 MMHG | BODY MASS INDEX: 25.75 KG/M2 | HEART RATE: 49 BPM | WEIGHT: 179.44 LBS

## 2023-05-22 DIAGNOSIS — K40.90 RIGHT INGUINAL HERNIA: Primary | ICD-10-CM

## 2023-05-22 PROBLEM — Z01.818 PRE-OP EVALUATION: Status: RESOLVED | Noted: 2023-04-27 | Resolved: 2023-05-22

## 2023-05-22 PROCEDURE — 1126F PR PAIN SEVERITY QUANTIFIED, NO PAIN PRESENT: ICD-10-PCS | Mod: CPTII,S$GLB,,

## 2023-05-22 PROCEDURE — 99999 PR PBB SHADOW E&M-EST. PATIENT-LVL III: ICD-10-PCS | Mod: PBBFAC,,,

## 2023-05-22 PROCEDURE — 1160F PR REVIEW ALL MEDS BY PRESCRIBER/CLIN PHARMACIST DOCUMENTED: ICD-10-PCS | Mod: CPTII,S$GLB,,

## 2023-05-22 PROCEDURE — 3078F PR MOST RECENT DIASTOLIC BLOOD PRESSURE < 80 MM HG: ICD-10-PCS | Mod: CPTII,S$GLB,,

## 2023-05-22 PROCEDURE — 1160F RVW MEDS BY RX/DR IN RCRD: CPT | Mod: CPTII,S$GLB,,

## 2023-05-22 PROCEDURE — 1159F PR MEDICATION LIST DOCUMENTED IN MEDICAL RECORD: ICD-10-PCS | Mod: CPTII,S$GLB,,

## 2023-05-22 PROCEDURE — 99999 PR PBB SHADOW E&M-EST. PATIENT-LVL III: CPT | Mod: PBBFAC,,,

## 2023-05-22 PROCEDURE — 99024 PR POST-OP FOLLOW-UP VISIT: ICD-10-PCS | Mod: S$GLB,,,

## 2023-05-22 PROCEDURE — 3075F PR MOST RECENT SYSTOLIC BLOOD PRESS GE 130-139MM HG: ICD-10-PCS | Mod: CPTII,S$GLB,,

## 2023-05-22 PROCEDURE — 3078F DIAST BP <80 MM HG: CPT | Mod: CPTII,S$GLB,,

## 2023-05-22 PROCEDURE — 99213 OFFICE O/P EST LOW 20 MIN: CPT

## 2023-05-22 PROCEDURE — 1126F AMNT PAIN NOTED NONE PRSNT: CPT | Mod: CPTII,S$GLB,,

## 2023-05-22 PROCEDURE — 1159F MED LIST DOCD IN RCRD: CPT | Mod: CPTII,S$GLB,,

## 2023-05-22 PROCEDURE — 99024 POSTOP FOLLOW-UP VISIT: CPT | Mod: S$GLB,,,

## 2023-05-22 PROCEDURE — 3075F SYST BP GE 130 - 139MM HG: CPT | Mod: CPTII,S$GLB,,

## 2023-05-22 NOTE — PROGRESS NOTES
[facility]  General Surgery History & Physical    SUBJECTIVE:     History of Present Illness:  Patient is a 77 y.o. male presents with a right inguinal hernia which he first noted about a month ago. He states the hernia bulges out and he has to push it back in. It does not cause him severe pain,  but it is uncomfortable. He is quite active and enjoys running and exercising--he worries that this will start to interfere with that.  Thinks he might have had a left inguinal hernia repair in the past, but he is unsure.    Interval History:  Since the last clinic visit, the patient underwent robotic right inguinal hernia repair.  He is doing well today with no complaints.  He denies any pain in the area of surgery.  He is tolerating a regular diet and having normal bowel movements.  He denies fevers, chills, nausea, vomiting, dysuria, hematuria, scrotal edema, and testicular pain.      Review of patient's allergies indicates:  No Known Allergies    Current Outpatient Medications   Medication Sig Dispense Refill    ascorbic acid, vitamin C, (VITAMIN C) 500 MG tablet Take 500 mg by mouth once daily.      aspirin (ECOTRIN) 81 MG EC tablet Take 81 mg by mouth.      cholecalciferol, vitamin D3, 2,000 unit Cap Take 1 capsule by mouth once daily.      cyanocobalamin (VITAMIN B-12) 1000 MCG tablet Take 100 mcg by mouth once daily.      docusate sodium (COLACE) 100 MG capsule Take 1 capsule (100 mg total) by mouth 2 (two) times daily. 60 capsule 1    HYDROcodone-acetaminophen (NORCO) 7.5-325 mg per tablet Take 1 tablet by mouth every 4 to 6 hours as needed for Pain. 25 tablet 0    ibuprofen (ADVIL,MOTRIN) 600 MG tablet Take 1 tablet (600 mg total) by mouth every 6 (six) hours as needed for Pain. Take with food. 25 tablet 0    methocarbamoL (ROBAXIN) 500 MG Tab Take 2 tablets (1,000 mg total) by mouth 4 (four) times daily as needed (Muscle spasm or pain). 35 tablet 0    multivitamin capsule Take 2 capsules by mouth  once daily.      rosuvastatin (CRESTOR) 5 MG tablet TAKE 1 TABLET EVERY DAY (NEED MD APPOINTMENT) 90 tablet 0    traZODone (DESYREL) 100 MG tablet TAKE 1 TABLET EVERY EVENING 90 tablet 0    vitamin E acetate (VITAMIN E ORAL) Take 180 Units by mouth once daily.        No current facility-administered medications for this visit.       Past Medical History:   Diagnosis Date    Carotid artery disease     Mild    Dyshidrotic eczema     ED (erectile dysfunction)     History of cardiac arrhythmia     Hyperlipidemia     Mild cognitive impairment     Pancreatic cyst     Shingles     Tinnitus of both ears     Urinary incontinence      Past Surgical History:   Procedure Laterality Date    CATARACT EXTRACTION W/  INTRAOCULAR LENS IMPLANT Bilateral 08/2017    COLONOSCOPY N/A 5/3/2023    Procedure: COLONOSCOPY;  Surgeon: Maria Luisa Brown DO;  Location: [facility] ENDO;  Service: Endoscopy;  Laterality: N/A;    EYE SURGERY Bilateral 05/2018    Laser treatment     INGUINAL HERNIA REPAIR      ROBOT-ASSISTED LAPAROSCOPIC REPAIR OF INGUINAL HERNIA USING DA KEILA XI Right 5/4/2023    Procedure: XI ROBOTIC REPAIR, HERNIA, INGUINAL;  Surgeon: Maria Luisa Brown DO;  Location: [facility] OR;  Service: General;  Laterality: Right;    TONSILLECTOMY       Family History   Problem Relation Age of Onset    Colon cancer Mother     Heart disease Father     No Known Problems Sister     No Known Problems Sister      Social History     Tobacco Use    Smoking status: Never    Smokeless tobacco: Never   Substance Use Topics    Alcohol use: Yes     Alcohol/week: 6.0 - 8.0 standard drinks     Types: 6 - 8 Glasses of wine per week    Drug use: No        Review of Systems:  Review of Systems   Constitutional:  Negative for chills and fever.   Respiratory:  Negative for shortness of breath.    Cardiovascular:  Negative for chest pain.   Gastrointestinal:  Negative for abdominal pain, constipation, diarrhea, nausea and vomiting.   Genitourinary:  Negative for  dysuria, hematuria, scrotal swelling and testicular pain.   Skin:  Negative for color change, pallor, rash and wound.     OBJECTIVE:     Vital Signs (Most Recent)  Pulse: (!) 49 (05/22/23 1322)  BP: 139/77 (05/22/23 1322)     81.4 kg (179 lb 7.3 oz)     Physical Exam:  Physical Exam  Vitals reviewed.   Constitutional:       General: He is not in acute distress.     Appearance: He is not ill-appearing, toxic-appearing or diaphoretic.   HENT:      Head: Normocephalic and atraumatic.      Nose: Nose normal.      Mouth/Throat:      Mouth: Mucous membranes are moist.   Eyes:      General: No scleral icterus.        Right eye: No discharge.         Left eye: No discharge.      Extraocular Movements: Extraocular movements intact.   Cardiovascular:      Rate and Rhythm: Normal rate.   Pulmonary:      Effort: Pulmonary effort is normal. No respiratory distress.   Abdominal:      General: There is no distension.      Palpations: Abdomen is soft.      Tenderness: There is no abdominal tenderness.      Comments: Abdomen is soft, nontender, nondistended.  Incisions clean and dry, healing well without signs of infection.  No fullness or swelling in the groin or scrotum.  No testicular tenderness to palpation.   Musculoskeletal:      Cervical back: No rigidity.   Skin:     General: Skin is warm and dry.      Coloration: Skin is not jaundiced or pale.   Neurological:      Mental Status: He is alert and oriented to person, place, and time.   Psychiatric:         Mood and Affect: Mood normal.         Behavior: Behavior normal.       Laboratory  WBC   Date Value Ref Range Status   04/18/2023 6.08 3.90 - 12.70 K/uL Final     Hemoglobin   Date Value Ref Range Status   04/18/2023 15.0 14.0 - 18.0 g/dL Final     Hematocrit   Date Value Ref Range Status   04/18/2023 46.5 40.0 - 54.0 % Final     Platelets   Date Value Ref Range Status   04/18/2023 218 150 - 450 K/uL Final     Sodium   Date Value Ref Range Status   04/18/2023 137 136 - 145  mmol/L Final     Potassium   Date Value Ref Range Status   04/18/2023 4.8 3.5 - 5.1 mmol/L Final     Creatinine   Date Value Ref Range Status   04/18/2023 1.0 0.5 - 1.4 mg/dL Final     Alkaline Phosphatase   Date Value Ref Range Status   04/18/2023 65 55 - 135 U/L Final     AST   Date Value Ref Range Status   04/18/2023 28 10 - 40 U/L Final     ALT   Date Value Ref Range Status   04/18/2023 22 10 - 44 U/L Final            ASSESSMENT/PLAN:     77-year-old male with right inguinal hernia now status post robotic repair who is recovering as expected.      - continue light duty, no lifting over 10 lb, no excessive bending and twisting, and no running for the next 4 weeks, a total of 6 weeks after surgery.  - he is scheduled for a scrotal ultrasound due to a testicular mass/cyst that was found postoperatively.  Dr. Brown will call him with these results.  - return to clinic in 1 month or as needed.      Mirela Doherty PA-C  [facility]

## 2023-05-29 ENCOUNTER — HOSPITAL ENCOUNTER (OUTPATIENT)
Dept: RADIOLOGY | Facility: HOSPITAL | Age: 78
Discharge: HOME | End: 2023-05-29
Attending: SURGERY
Payer: MEDICARE

## 2023-05-29 DIAGNOSIS — N50.89 TESTICULAR MASS: ICD-10-CM

## 2023-05-29 PROCEDURE — 76870 US SCROTUM AND TESTICLES: ICD-10-PCS | Mod: 26,,, | Performed by: STUDENT IN AN ORGANIZED HEALTH CARE EDUCATION/TRAINING PROGRAM

## 2023-05-29 PROCEDURE — 76870 US EXAM SCROTUM: CPT | Mod: 26,,, | Performed by: STUDENT IN AN ORGANIZED HEALTH CARE EDUCATION/TRAINING PROGRAM

## 2023-05-29 PROCEDURE — 76870 US EXAM SCROTUM: CPT | Mod: TC

## 2023-05-30 ENCOUNTER — PATIENT MESSAGE (OUTPATIENT)
Dept: SURGERY | Facility: CLINIC | Age: 78
End: 2023-05-30
Payer: MEDICARE

## 2023-06-21 ENCOUNTER — PES CALL (OUTPATIENT)
Dept: ADMINISTRATIVE | Facility: CLINIC | Age: 78
End: 2023-06-21
Payer: MEDICARE

## 2023-06-26 ENCOUNTER — OFFICE VISIT (OUTPATIENT)
Dept: SURGERY | Facility: CLINIC | Age: 78
End: 2023-06-26
Payer: MEDICARE

## 2023-06-26 VITALS
BODY MASS INDEX: 26.57 KG/M2 | SYSTOLIC BLOOD PRESSURE: 130 MMHG | WEIGHT: 185.19 LBS | HEART RATE: 54 BPM | DIASTOLIC BLOOD PRESSURE: 83 MMHG

## 2023-06-26 DIAGNOSIS — K40.90 RIGHT INGUINAL HERNIA: Primary | ICD-10-CM

## 2023-06-26 PROCEDURE — 3075F SYST BP GE 130 - 139MM HG: CPT | Mod: CPTII,S$GLB,,

## 2023-06-26 PROCEDURE — 3075F PR MOST RECENT SYSTOLIC BLOOD PRESS GE 130-139MM HG: ICD-10-PCS | Mod: CPTII,S$GLB,,

## 2023-06-26 PROCEDURE — 3079F DIAST BP 80-89 MM HG: CPT | Mod: CPTII,S$GLB,,

## 2023-06-26 PROCEDURE — 1160F PR REVIEW ALL MEDS BY PRESCRIBER/CLIN PHARMACIST DOCUMENTED: ICD-10-PCS | Mod: CPTII,S$GLB,,

## 2023-06-26 PROCEDURE — 1159F PR MEDICATION LIST DOCUMENTED IN MEDICAL RECORD: ICD-10-PCS | Mod: CPTII,S$GLB,,

## 2023-06-26 PROCEDURE — 99999 PR PBB SHADOW E&M-EST. PATIENT-LVL III: CPT | Mod: PBBFAC,,,

## 2023-06-26 PROCEDURE — 99999 PR PBB SHADOW E&M-EST. PATIENT-LVL III: ICD-10-PCS | Mod: PBBFAC,,,

## 2023-06-26 PROCEDURE — 99024 POSTOP FOLLOW-UP VISIT: CPT | Mod: S$GLB,,,

## 2023-06-26 PROCEDURE — 1126F PR PAIN SEVERITY QUANTIFIED, NO PAIN PRESENT: ICD-10-PCS | Mod: CPTII,S$GLB,,

## 2023-06-26 PROCEDURE — 1160F RVW MEDS BY RX/DR IN RCRD: CPT | Mod: CPTII,S$GLB,,

## 2023-06-26 PROCEDURE — 99024 PR POST-OP FOLLOW-UP VISIT: ICD-10-PCS | Mod: S$GLB,,,

## 2023-06-26 PROCEDURE — 1126F AMNT PAIN NOTED NONE PRSNT: CPT | Mod: CPTII,S$GLB,,

## 2023-06-26 PROCEDURE — 1159F MED LIST DOCD IN RCRD: CPT | Mod: CPTII,S$GLB,,

## 2023-06-26 PROCEDURE — 3079F PR MOST RECENT DIASTOLIC BLOOD PRESSURE 80-89 MM HG: ICD-10-PCS | Mod: CPTII,S$GLB,,

## 2023-06-26 NOTE — PROGRESS NOTES
[facility] -   General Surgery History & Physical    SUBJECTIVE:     History of Present Illness:  Patient is a 77 y.o. male presents with a right inguinal hernia which he first noted about a month ago. He states the hernia bulges out and he has to push it back in. It does not cause him severe pain,  but it is uncomfortable. He is quite active and enjoys running and exercising--he worries that this will start to interfere with that.  Thinks he might have had a left inguinal hernia repair in the past, but he is unsure.    Interval History:  Since the last clinic visit, the patient has done well.  He he has mild burning discomfort in the groin at times.  He is otherwise doing well and has started to ease back to normal activity.  He denies fevers, chills, nausea, vomiting, dysuria, hematuria, constipation, diarrhea, testicular pain, and scrotal edema.      Review of patient's allergies indicates:  No Known Allergies    Current Outpatient Medications   Medication Sig Dispense Refill    ascorbic acid, vitamin C, (VITAMIN C) 500 MG tablet Take 500 mg by mouth once daily.      aspirin (ECOTRIN) 81 MG EC tablet Take 81 mg by mouth.      cholecalciferol, vitamin D3, 2,000 unit Cap Take 1 capsule by mouth once daily.      cyanocobalamin (VITAMIN B-12) 1000 MCG tablet Take 100 mcg by mouth once daily.      docusate sodium (COLACE) 100 MG capsule Take 1 capsule (100 mg total) by mouth 2 (two) times daily. 60 capsule 1    multivitamin capsule Take 2 capsules by mouth once daily.      rosuvastatin (CRESTOR) 5 MG tablet TAKE 1 TABLET EVERY DAY (NEED MD APPOINTMENT) 90 tablet 0    traZODone (DESYREL) 100 MG tablet TAKE 1 TABLET EVERY EVENING 90 tablet 0    vitamin E acetate (VITAMIN E ORAL) Take 180 Units by mouth once daily.        No current facility-administered medications for this visit.       Past Medical History:   Diagnosis Date    Carotid artery disease     Mild    Dyshidrotic eczema     ED (erectile  dysfunction)     History of cardiac arrhythmia     Hyperlipidemia     Mild cognitive impairment     Pancreatic cyst     Pre-op evaluation 4/27/2023    Right inguinal hernia 4/27/2023    Shingles     Tinnitus of both ears     Urinary incontinence      Past Surgical History:   Procedure Laterality Date    CATARACT EXTRACTION W/  INTRAOCULAR LENS IMPLANT Bilateral 08/2017    COLONOSCOPY N/A 5/3/2023    Procedure: COLONOSCOPY;  Surgeon: Maria Luisa Brown DO;  Location: [facility] ENDO;  Service: Endoscopy;  Laterality: N/A;    EYE SURGERY Bilateral 05/2018    Laser treatment     INGUINAL HERNIA REPAIR      ROBOT-ASSISTED LAPAROSCOPIC REPAIR OF INGUINAL HERNIA USING DA KEILA XI Right 5/4/2023    Procedure: XI ROBOTIC REPAIR, HERNIA, INGUINAL;  Surgeon: Maria Luisa Brown DO;  Location: [facility] OR;  Service: General;  Laterality: Right;    TONSILLECTOMY       Family History   Problem Relation Age of Onset    Colon cancer Mother     Heart disease Father     No Known Problems Sister     No Known Problems Sister      Social History     Tobacco Use    Smoking status: Never    Smokeless tobacco: Never   Substance Use Topics    Alcohol use: Yes     Alcohol/week: 6.0 - 8.0 standard drinks     Types: 6 - 8 Glasses of wine per week    Drug use: No        Review of Systems:  Review of Systems   Constitutional:  Negative for chills and fever.   Respiratory:  Negative for shortness of breath.    Cardiovascular:  Negative for chest pain.   Gastrointestinal:  Negative for abdominal pain, constipation, diarrhea, nausea and vomiting.   Genitourinary:  Negative for dysuria, hematuria, scrotal swelling and testicular pain.   Skin:  Negative for color change, pallor, rash and wound.     OBJECTIVE:     Vital Signs (Most Recent)  Pulse: (!) 54 (06/26/23 1315)  BP: 130/83 (06/26/23 1315)     84 kg (185 lb 3 oz)     Physical Exam:  Physical Exam  Vitals reviewed.   Constitutional:       General: He is not in acute distress.     Appearance: He is not  ill-appearing, toxic-appearing or diaphoretic.   HENT:      Head: Normocephalic and atraumatic.      Nose: Nose normal.      Mouth/Throat:      Mouth: Mucous membranes are moist.   Eyes:      General: No scleral icterus.        Right eye: No discharge.         Left eye: No discharge.      Extraocular Movements: Extraocular movements intact.   Cardiovascular:      Rate and Rhythm: Bradycardia present.   Pulmonary:      Effort: Pulmonary effort is normal. No respiratory distress.   Abdominal:      General: There is no distension.      Palpations: Abdomen is soft.      Tenderness: There is no abdominal tenderness.      Comments: Abdomen is soft, nontender, nondistended.  Incisions clean and dry, healing well without signs of infection.  No fullness or swelling in the groin or scrotum.  No testicular tenderness to palpation.   Musculoskeletal:      Cervical back: No rigidity.   Skin:     General: Skin is warm and dry.      Coloration: Skin is not jaundiced or pale.   Neurological:      Mental Status: He is alert and oriented to person, place, and time.   Psychiatric:         Mood and Affect: Mood normal.         Behavior: Behavior normal.       Laboratory  WBC   Date Value Ref Range Status   04/18/2023 6.08 3.90 - 12.70 K/uL Final     Hemoglobin   Date Value Ref Range Status   04/18/2023 15.0 14.0 - 18.0 g/dL Final     Hematocrit   Date Value Ref Range Status   04/18/2023 46.5 40.0 - 54.0 % Final     Platelets   Date Value Ref Range Status   04/18/2023 218 150 - 450 K/uL Final     Sodium   Date Value Ref Range Status   04/18/2023 137 136 - 145 mmol/L Final     Potassium   Date Value Ref Range Status   04/18/2023 4.8 3.5 - 5.1 mmol/L Final     Creatinine   Date Value Ref Range Status   04/18/2023 1.0 0.5 - 1.4 mg/dL Final     Alkaline Phosphatase   Date Value Ref Range Status   04/18/2023 65 55 - 135 U/L Final     AST   Date Value Ref Range Status   04/18/2023 28 10 - 40 U/L Final     ALT   Date Value Ref Range Status    04/18/2023 22 10 - 44 U/L Final            ASSESSMENT/PLAN:     77-year-old male with right inguinal hernia now status post robotic repair who is recovering as expected.      - no further interventions or restrictions   - regarding burning discomfort, could be related to the nerve in the area.  Discussed that this is often self-limiting but if it persists, the patient should let us know and we can trial a neuropathic pain medication versus nerve block.  He would like to proceed with conservative treatment at this time.  - return to clinic as needed or if any issues arise.    Mirela Doherty PA-C  [facility]

## 2023-07-11 ENCOUNTER — OFFICE VISIT (OUTPATIENT)
Dept: FAMILY MEDICINE | Facility: CLINIC | Age: 78
End: 2023-07-11
Payer: MEDICARE

## 2023-07-11 VITALS
TEMPERATURE: 99 F | OXYGEN SATURATION: 97 % | HEART RATE: 77 BPM | WEIGHT: 183.06 LBS | BODY MASS INDEX: 26.21 KG/M2 | SYSTOLIC BLOOD PRESSURE: 118 MMHG | DIASTOLIC BLOOD PRESSURE: 72 MMHG | HEIGHT: 70 IN

## 2023-07-11 DIAGNOSIS — E78.5 HYPERLIPIDEMIA, UNSPECIFIED HYPERLIPIDEMIA TYPE: Chronic | ICD-10-CM

## 2023-07-11 DIAGNOSIS — I77.9 CAROTID ARTERY DISEASE, UNSPECIFIED LATERALITY, UNSPECIFIED TYPE: ICD-10-CM

## 2023-07-11 DIAGNOSIS — Z00.00 PREVENTATIVE HEALTH CARE: Primary | ICD-10-CM

## 2023-07-11 PROCEDURE — 1101F PR PT FALLS ASSESS DOC 0-1 FALLS W/OUT INJ PAST YR: ICD-10-PCS | Mod: CPTII,S$GLB,, | Performed by: FAMILY MEDICINE

## 2023-07-11 PROCEDURE — 3074F SYST BP LT 130 MM HG: CPT | Mod: CPTII,S$GLB,, | Performed by: FAMILY MEDICINE

## 2023-07-11 PROCEDURE — 99999 PR PBB SHADOW E&M-EST. PATIENT-LVL IV: CPT | Mod: PBBFAC,,, | Performed by: FAMILY MEDICINE

## 2023-07-11 PROCEDURE — 3078F PR MOST RECENT DIASTOLIC BLOOD PRESSURE < 80 MM HG: ICD-10-PCS | Mod: CPTII,S$GLB,, | Performed by: FAMILY MEDICINE

## 2023-07-11 PROCEDURE — 3288F FALL RISK ASSESSMENT DOCD: CPT | Mod: CPTII,S$GLB,, | Performed by: FAMILY MEDICINE

## 2023-07-11 PROCEDURE — 99397 PER PM REEVAL EST PAT 65+ YR: CPT | Mod: S$GLB,,, | Performed by: FAMILY MEDICINE

## 2023-07-11 PROCEDURE — 1126F AMNT PAIN NOTED NONE PRSNT: CPT | Mod: CPTII,S$GLB,, | Performed by: FAMILY MEDICINE

## 2023-07-11 PROCEDURE — 3078F DIAST BP <80 MM HG: CPT | Mod: CPTII,S$GLB,, | Performed by: FAMILY MEDICINE

## 2023-07-11 PROCEDURE — 99999 PR PBB SHADOW E&M-EST. PATIENT-LVL IV: ICD-10-PCS | Mod: PBBFAC,,, | Performed by: FAMILY MEDICINE

## 2023-07-11 PROCEDURE — 3074F PR MOST RECENT SYSTOLIC BLOOD PRESSURE < 130 MM HG: ICD-10-PCS | Mod: CPTII,S$GLB,, | Performed by: FAMILY MEDICINE

## 2023-07-11 PROCEDURE — 1101F PT FALLS ASSESS-DOCD LE1/YR: CPT | Mod: CPTII,S$GLB,, | Performed by: FAMILY MEDICINE

## 2023-07-11 PROCEDURE — 1160F RVW MEDS BY RX/DR IN RCRD: CPT | Mod: CPTII,S$GLB,, | Performed by: FAMILY MEDICINE

## 2023-07-11 PROCEDURE — 1159F MED LIST DOCD IN RCRD: CPT | Mod: CPTII,S$GLB,, | Performed by: FAMILY MEDICINE

## 2023-07-11 PROCEDURE — 1126F PR PAIN SEVERITY QUANTIFIED, NO PAIN PRESENT: ICD-10-PCS | Mod: CPTII,S$GLB,, | Performed by: FAMILY MEDICINE

## 2023-07-11 PROCEDURE — 99397 PR PREVENTIVE VISIT,EST,65 & OVER: ICD-10-PCS | Mod: S$GLB,,, | Performed by: FAMILY MEDICINE

## 2023-07-11 PROCEDURE — 1159F PR MEDICATION LIST DOCUMENTED IN MEDICAL RECORD: ICD-10-PCS | Mod: CPTII,S$GLB,, | Performed by: FAMILY MEDICINE

## 2023-07-11 PROCEDURE — 1160F PR REVIEW ALL MEDS BY PRESCRIBER/CLIN PHARMACIST DOCUMENTED: ICD-10-PCS | Mod: CPTII,S$GLB,, | Performed by: FAMILY MEDICINE

## 2023-07-11 PROCEDURE — 3288F PR FALLS RISK ASSESSMENT DOCUMENTED: ICD-10-PCS | Mod: CPTII,S$GLB,, | Performed by: FAMILY MEDICINE

## 2023-07-11 NOTE — PROGRESS NOTES
CHIEF COMPLAINT:  This is a 77-year-old male here for preventive health exam.       SUBJECTIVE:  Patient is doing well without complaints.  He has hyperlipidemia for which he takes takes Crestor 5 mg daily.  He has a history of mild carotid artery disease and arrhythmia. The patient was seeing his urologist twice yearly but has not been in awhile. He takes trazodone 50 mg nightly for insomnia. He has tinnitus of both ears, especially at night and a past history of shingles.  The patient exercises by running every 2-3 days and doing yoga.  Patient is a vegetarian but still eats animal products such as eggs and cheese.  Patient drives, cooks and does his own finances.  He lives with his ailing girlfriend whom he cares for.       Eye exam 2021.  Colonoscopy May 2023 due again in May 2028.  Tdap December 2017.  Prevnar June 2019.  Flu September 2022.  COVID 19 vaccine January, February, December 2021, October 2022.     ROS:  GENERAL: Patient denies fever, chills, night sweats.  Patient denies weight gain or loss. Patient denies anorexia, fatigue, weakness or swollen glands.  SKIN: Patient denies rash or hair loss.  HEENT: Patient denies sore throat, ear pain, hearing loss, nasal congestion, or runny nose. Patient denies visual disturbance, eye irritation or discharge.  Positive tinnitus.  LUNGS: Patient denies cough, wheeze or hemoptysis.  CARDIOVASCULAR: Patient denies chest pain, shortness of breath, palpitations, syncope or lower extremity edema.  GI: Patient denies abdominal pain, nausea, vomiting, diarrhea, constipation, blood in stool or melena.  GENITOURINARY: Patient denies irregular vaginal bleeding.  Patient denies dysuria, frequency, hematuria, nocturia, or urgency.  Positive for incontinence and erectile dysfunction.  MUSCULOSKELETAL: Patient denies joint pain, swelling, redness or warmth.  NEUROLOGIC: Patient denies headache, vertigo, paresthesias, weakness in limb, dysarthria, dysphagia or abnormality of  gait.  PSYCHIATRIC: Patient denies anxiety, depression, or memory loss.     OBJECTIVE:   GENERAL:  Well-developed well-nourished pleasant slightly overweight white male alert and oriented x3, in no acute distress.  Mild cognitive impairment.    SKIN: Clear without rash.  Normal color and tone.  HEENT: Eyes: Clear conjunctivae.  No scleral icterus.  Pupils equal reactive to light and accommodation.  Ears: Clear canals. Clear TMs.  Nose: Without congestion.  Pharynx: Without injection or exudates.  NECK: Supple, normal range of motion.  No masses, nodes or enlarged thyroid.  No JVD.  Carotids 2+ and equal.  No bruits.  LUNGS: Clear to auscultation.  Normal respiratory effort.  CARDIOVASCULAR: Regular rhythm, normal S1, S2 without murmur, gallop or rub.  BACK: No CVA or spinal tenderness.  ABDOMEN: Normal appearance.  Active bowel sounds.  Soft, nontender without mass or organomegaly.  No rebound or guarding.  EXTREMITIES: Without cyanosis, clubbing or edema.  Distal pulses 2+ and equal.  Normal range of motion in all extremities.  No joint effusion, erythema or warmth.  NEUROLOGIC:   Cranial nerves II through XII without deficit.  Motor strength equal bilaterally.  Sensation normal to touch.  Deep tendon reflexes 2+ and equal.  Gait without abnormality.  No tremor.  Negative cerebellar signs.  tejal masses, tenderness or swelling.  Negative hernia.  TAMERA:  Minimally enlarged prostate.  No palpable masses.  Heme-negative stool x2.    ASSESSMENT:  1. Preventative health care    2. Hyperlipidemia, unspecified hyperlipidemia type    3. Carotid artery disease, unspecified laterality, unspecified type      PLAN:  1. Weight reduction. Exercise regularly.  2. Age-appropriate counseling.  3. Recent lab reviewed and acceptable.    4. Refill medications as needed.  5. Follow-up annually.    This note is generated with speech recognition software and is subject to transcription error and sound alike phrases that may be missed by  proofreading.

## 2023-08-09 ENCOUNTER — PES CALL (OUTPATIENT)
Dept: ADMINISTRATIVE | Facility: CLINIC | Age: 78
End: 2023-08-09
Payer: MEDICARE

## 2023-08-12 NOTE — TELEPHONE ENCOUNTER
No care due was identified.  [facility] Embedded Care Due Messages. Reference number: [id number].   8/12/2023 3:15:10 PM CDT

## 2023-08-13 RX ORDER — TRAZODONE HYDROCHLORIDE 100 MG/1
TABLET ORAL
Qty: 90 TABLET | Refills: 3 | Status: SHIPPED | OUTPATIENT
Start: 2023-08-13

## 2023-08-14 NOTE — TELEPHONE ENCOUNTER
Refill Decision Note   Jsoe Soto  is requesting a refill authorization.  Brief Assessment and Rationale for Refill:  Approve     Medication Therapy Plan:         Comments:     Note composed:9:19 PM 08/13/2023

## 2023-09-21 ENCOUNTER — OFFICE VISIT (OUTPATIENT)
Dept: HOME HEALTH SERVICES | Facility: CLINIC | Age: 78
End: 2023-09-21
Payer: MEDICARE

## 2023-09-21 VITALS
OXYGEN SATURATION: 98 % | DIASTOLIC BLOOD PRESSURE: 86 MMHG | TEMPERATURE: 98 F | BODY MASS INDEX: 26.2 KG/M2 | WEIGHT: 183 LBS | SYSTOLIC BLOOD PRESSURE: 123 MMHG | HEART RATE: 59 BPM | HEIGHT: 70 IN

## 2023-09-21 DIAGNOSIS — I77.9 CAROTID ARTERY DISEASE, UNSPECIFIED LATERALITY, UNSPECIFIED TYPE: ICD-10-CM

## 2023-09-21 DIAGNOSIS — R00.1 BRADYCARDIA: ICD-10-CM

## 2023-09-21 DIAGNOSIS — Z00.00 ENCOUNTER FOR PREVENTIVE HEALTH EXAMINATION: Primary | ICD-10-CM

## 2023-09-21 DIAGNOSIS — E78.5 HYPERLIPIDEMIA, UNSPECIFIED HYPERLIPIDEMIA TYPE: Chronic | ICD-10-CM

## 2023-09-21 DIAGNOSIS — R41.3 MEMORY CHANGES: ICD-10-CM

## 2023-09-21 DIAGNOSIS — G47.00 INSOMNIA, UNSPECIFIED TYPE: ICD-10-CM

## 2023-09-21 PROCEDURE — 3288F FALL RISK ASSESSMENT DOCD: CPT | Mod: CPTII,S$GLB,, | Performed by: NURSE PRACTITIONER

## 2023-09-21 PROCEDURE — 3074F PR MOST RECENT SYSTOLIC BLOOD PRESSURE < 130 MM HG: ICD-10-PCS | Mod: CPTII,S$GLB,, | Performed by: NURSE PRACTITIONER

## 2023-09-21 PROCEDURE — 1170F FXNL STATUS ASSESSED: CPT | Mod: CPTII,S$GLB,, | Performed by: NURSE PRACTITIONER

## 2023-09-21 PROCEDURE — 1160F PR REVIEW ALL MEDS BY PRESCRIBER/CLIN PHARMACIST DOCUMENTED: ICD-10-PCS | Mod: CPTII,S$GLB,, | Performed by: NURSE PRACTITIONER

## 2023-09-21 PROCEDURE — G0439 PPPS, SUBSEQ VISIT: HCPCS | Mod: S$GLB,,, | Performed by: NURSE PRACTITIONER

## 2023-09-21 PROCEDURE — 1159F MED LIST DOCD IN RCRD: CPT | Mod: CPTII,S$GLB,, | Performed by: NURSE PRACTITIONER

## 2023-09-21 PROCEDURE — 3079F PR MOST RECENT DIASTOLIC BLOOD PRESSURE 80-89 MM HG: ICD-10-PCS | Mod: CPTII,S$GLB,, | Performed by: NURSE PRACTITIONER

## 2023-09-21 PROCEDURE — 3074F SYST BP LT 130 MM HG: CPT | Mod: CPTII,S$GLB,, | Performed by: NURSE PRACTITIONER

## 2023-09-21 PROCEDURE — 3288F PR FALLS RISK ASSESSMENT DOCUMENTED: ICD-10-PCS | Mod: CPTII,S$GLB,, | Performed by: NURSE PRACTITIONER

## 2023-09-21 PROCEDURE — 1159F PR MEDICATION LIST DOCUMENTED IN MEDICAL RECORD: ICD-10-PCS | Mod: CPTII,S$GLB,, | Performed by: NURSE PRACTITIONER

## 2023-09-21 PROCEDURE — G0439 PR MEDICARE ANNUAL WELLNESS SUBSEQUENT VISIT: ICD-10-PCS | Mod: S$GLB,,, | Performed by: NURSE PRACTITIONER

## 2023-09-21 PROCEDURE — 1126F AMNT PAIN NOTED NONE PRSNT: CPT | Mod: CPTII,S$GLB,, | Performed by: NURSE PRACTITIONER

## 2023-09-21 PROCEDURE — 1101F PT FALLS ASSESS-DOCD LE1/YR: CPT | Mod: CPTII,S$GLB,, | Performed by: NURSE PRACTITIONER

## 2023-09-21 PROCEDURE — 1170F PR FUNCTIONAL STATUS ASSESSED: ICD-10-PCS | Mod: CPTII,S$GLB,, | Performed by: NURSE PRACTITIONER

## 2023-09-21 PROCEDURE — 3079F DIAST BP 80-89 MM HG: CPT | Mod: CPTII,S$GLB,, | Performed by: NURSE PRACTITIONER

## 2023-09-21 PROCEDURE — 1160F RVW MEDS BY RX/DR IN RCRD: CPT | Mod: CPTII,S$GLB,, | Performed by: NURSE PRACTITIONER

## 2023-09-21 PROCEDURE — 1126F PR PAIN SEVERITY QUANTIFIED, NO PAIN PRESENT: ICD-10-PCS | Mod: CPTII,S$GLB,, | Performed by: NURSE PRACTITIONER

## 2023-09-21 PROCEDURE — 1101F PR PT FALLS ASSESS DOC 0-1 FALLS W/OUT INJ PAST YR: ICD-10-PCS | Mod: CPTII,S$GLB,, | Performed by: NURSE PRACTITIONER

## 2023-09-21 RX ORDER — VITAMIN E (DL,TOCOPHERYL ACET) 90 MG
1 CAPSULE ORAL DAILY
COMMUNITY

## 2023-09-21 NOTE — PATIENT INSTRUCTIONS
Counseling and Referral of Other Preventative  (Italic type indicates deductible and co-insurance are waived)    Patient Name: Jose Soto  Today's Date: 9/21/2023    Health Maintenance       Date Due Completion Date    COVID-19 Vaccine (5 - Moderna series) 02/12/2023 10/12/2022    Lipid Panel 02/28/2028 2/28/2023    Override on 2/6/2018: Done        No orders of the defined types were placed in this encounter.      The following information is provided to all patients.  This information is to help you find resources for any of the problems found today that may be affecting your health:                Living healthy guide: www.[facility].[place].[facility]      Understanding Diabetes: www.diabetes.org      Eating healthy: www.cdc.gov/healthyweight      [facility] home safety checklist: www.cdc.gov/steadi/patient.html      Agency on Aging: www.goea.[place].[facility]      Alcoholics anonymous (AA): www.aa.org      Physical Activity: www.ronel.nih.gov/qi3stlg      Tobacco use: www.quitwithusla.org

## 2023-09-21 NOTE — PROGRESS NOTES
"Jose Soto presented for Medicare AW today. The following components were reviewed and updated:    Medical history  Family History  Social history  Allergies and Current Medications  Health Risk Assessment  Health Maintenance  Care Team    **See Completed Assessments for Annual Wellness visit with in the encounter summary    The following assessments were completed:  Depression Screening  Cognitive function Screening      Timed Get Up Test  Whisper Test    Vitals:    09/21/23 1226   BP: 123/86   Pulse: (!) 59   Temp: 98.2 °F (36.8 °C)   TempSrc: Temporal   SpO2: 98%   Weight: 83 kg (183 lb)   Height: 5' 10" (1.778 m)     Body mass index is 26.26 kg/m².   ]    Physical Exam  Vitals reviewed.   Constitutional:       Appearance: Normal appearance.      Comments: Energetic smiling     HENT:      Head: Normocephalic and atraumatic.   Cardiovascular:      Rate and Rhythm: Normal rate and regular rhythm.      Pulses: Normal pulses.      Heart sounds: Normal heart sounds.   Pulmonary:      Effort: Pulmonary effort is normal.      Breath sounds: Normal breath sounds.   Musculoskeletal:         General: Normal range of motion.      Cervical back: Normal range of motion and neck supple.   Skin:     General: Skin is warm and dry.   Neurological:      Mental Status: He is alert and oriented to person, place, and time.   Psychiatric:         Mood and Affect: Mood normal.         Behavior: Behavior normal.          Outpatient Medications Marked as Taking for the 9/21/23 encounter (Office Visit) with Thania Ulrich FNP   Medication Sig Dispense Refill    ascorbic acid, vitamin C, (VITAMIN C) 500 MG tablet Take 500 mg by mouth once daily.      aspirin (ECOTRIN) 81 MG EC tablet Take 81 mg by mouth.      cholecalciferol, vitamin D3, 2,000 unit Cap Take 1 capsule by mouth once daily.      coenzyme Q10 400 mg Cap Take 1 capsule by mouth Daily.      cyanocobalamin (VITAMIN B-12) 1000 MCG tablet Take 100 mcg by mouth once daily.  " "    multivitamin capsule Take 2 capsules by mouth once daily.      rosuvastatin (CRESTOR) 5 MG tablet TAKE 1 TABLET EVERY DAY (NEED MD APPOINTMENT) 90 tablet 0    traZODone (DESYREL) 100 MG tablet TAKE 1 TABLET EVERY EVENING 90 tablet 3    vitamin E acetate (VITAMIN E ORAL) Take 180 Units by mouth once daily.           Diagnoses and health risks identified today and associated recommendations/orders:  1. Encounter for preventive health examination  Medicare awv complete. Health maintenance:  discussed obtaining new covid 19 vaccine at a pharmacy.     2. Carotid artery disease, unspecified laterality, unspecified type  Chronic and stable on aspirin and statin medication. Continue current. F/u with pcp.      3. Hyperlipidemia, unspecified hyperlipidemia type  Lab Results   Component Value Date    CHOL 185 02/28/2023    CHOL 198 09/09/2021    CHOL 182 08/05/2020     Lab Results   Component Value Date    HDL 80 (H) 02/28/2023    HDL 80 (H) 09/09/2021    HDL 69 08/05/2020     Lab Results   Component Value Date    LDLCALC 92.6 02/28/2023    LDLCALC 106.6 09/09/2021    LDLCALC 99.0 08/05/2020     No results found for: "DLDL"  Lab Results   Component Value Date    TRIG 62 02/28/2023    TRIG 57 09/09/2021    TRIG 70 08/05/2020       f1   Lab Results   Component Value Date    CHOLHDL 43.2 02/28/2023    CHOLHDL 40.4 09/09/2021    CHOLHDL 37.9 08/05/2020    Chronic and stable on statin medication. Continue current. F/u with pcp.      4. Bradycardia  Chronic and stable. Asymptomatic. F/u with pcp.     5. Memory changes  No memory issues identified today assessment.  Cognitive function screening 5/5.      6. Insomnia  Chronic and stable. Continue current management. See med list above. Follow up with PCP.       Provided Jose with a 5-10 year written screening schedule and personal prevention plan. Recommendations were developed using the USPSTF age appropriate recommendations. Education, counseling, and referrals were " provided as needed.  After Visit Summary printed and given to patient which includes a list of additional screenings\tests needed.    No follow-ups on file.      Thania Ulrich, SIRIAP  I offered to discuss advanced care planning, including how to pick a person who would make decisions for you if you were unable to make them for yourself, called a health care power of , and what kind of decisions you might make such as use of life sustaining treatments such as ventilators and tube feeding when faced with a life limiting illness recorded on a living will that they will need to know. (How you want to be cared for as you near the end of your natural life)     X  Patient has advanced directives written and agrees to provide copies to the institution.

## 2023-09-21 NOTE — Clinical Note
Medicare awv complete. Health maintenance:  discussed obtaining new covid 19 vaccine at a pharmacy.

## 2023-09-26 ENCOUNTER — PATIENT MESSAGE (OUTPATIENT)
Dept: INTERNAL MEDICINE | Facility: CLINIC | Age: 78
End: 2023-09-26
Payer: MEDICARE

## 2024-02-29 RX ORDER — ROSUVASTATIN CALCIUM 5 MG/1
TABLET, COATED ORAL
Qty: 90 TABLET | Refills: 0 | Status: SHIPPED | OUTPATIENT
Start: 2024-02-29 | End: 2024-05-13

## 2024-02-29 NOTE — TELEPHONE ENCOUNTER
Care Due:                  Date            Visit Type   Department     Provider  --------------------------------------------------------------------------------                                EP -                              PRIMARY      [facility]  Last Visit: 07-      CARE ([facility])   MEDICINE       Angela Tejada  Next Visit: None Scheduled  None         None Found                                                            Last  Test          Frequency    Reason                     Performed    Due Date  --------------------------------------------------------------------------------    CMP.........  12 months..  rosuvastatin.............  04- 04-    Lipid Panel.  12 months..  rosuvastatin.............  02- 02-    [employer] Embedded Care Due Messages. Reference number: [id number].   2/29/2024 2:25:39 AM CST

## 2024-02-29 NOTE — TELEPHONE ENCOUNTER
Refill Routing Note   Medication(s) are not appropriate for processing by [facility] for the following reason(s):        Required labs outdated    [facility] action(s):  Defer     Requires labs : Yes             Appointments  past 12m or future 3m with PCP    Date Provider   Last Visit   7/11/2023 Angela Tejada MD   Next Visit   2/29/2024 Angela Tejada MD   ED visits in past 90 days: 0        Note composed:5:37 AM 02/29/2024

## 2024-05-12 NOTE — TELEPHONE ENCOUNTER
Care Due:                  Date            Visit Type   Department     Provider  --------------------------------------------------------------------------------                                EP -                              PRIMARY      [facility]  Last Visit: 07-      CARE ([facility])   MEDICINE       Angela Tejada                              EP -                              PRIMARY      [facility]  Next Visit: 07-      CARE ([facility])   MEDICINE       Angela Tejada                                                            Last  Test          Frequency    Reason                     Performed    Due Date  --------------------------------------------------------------------------------    CMP.........  12 months..  rosuvastatin.............  04- 04-    Lipid Panel.  12 months..  rosuvastatin.............  02- 02-    [facility] Embedded Care Due Messages. Reference number: [id number].   5/12/2024 4:31:33 AM CDT

## 2024-05-13 RX ORDER — ROSUVASTATIN CALCIUM 5 MG/1
TABLET, COATED ORAL
Qty: 90 TABLET | Refills: 0 | Status: SHIPPED | OUTPATIENT
Start: 2024-05-13

## 2024-05-13 NOTE — TELEPHONE ENCOUNTER
Refill Routing Note   Medication(s) are not appropriate for processing by [facility] for the following reason(s):        Required labs outdated    [facility] action(s):  Defer     Requires labs : Yes             Appointments  past 12m or future 3m with PCP    Date Provider   Last Visit   7/11/2023 Angela Tejada MD   Next Visit   7/11/2024 Angela Tejada MD   ED visits in past 90 days: 0        Note composed:1:51 AM 05/13/2024

## 2024-07-11 ENCOUNTER — OFFICE VISIT (OUTPATIENT)
Dept: FAMILY MEDICINE | Facility: CLINIC | Age: 79
End: 2024-07-11
Payer: MEDICARE

## 2024-07-11 VITALS
HEART RATE: 84 BPM | OXYGEN SATURATION: 95 % | BODY MASS INDEX: 26.99 KG/M2 | DIASTOLIC BLOOD PRESSURE: 80 MMHG | TEMPERATURE: 98 F | HEIGHT: 70 IN | WEIGHT: 188.5 LBS | SYSTOLIC BLOOD PRESSURE: 122 MMHG

## 2024-07-11 DIAGNOSIS — E78.5 HYPERLIPIDEMIA, UNSPECIFIED HYPERLIPIDEMIA TYPE: Chronic | ICD-10-CM

## 2024-07-11 DIAGNOSIS — G30.1 MILD LATE ONSET ALZHEIMER'S DEMENTIA WITH AGITATION: ICD-10-CM

## 2024-07-11 DIAGNOSIS — Z00.00 PREVENTATIVE HEALTH CARE: Primary | ICD-10-CM

## 2024-07-11 DIAGNOSIS — I77.9 CAROTID ARTERY DISEASE, UNSPECIFIED LATERALITY, UNSPECIFIED TYPE: ICD-10-CM

## 2024-07-11 DIAGNOSIS — F02.A11 MILD LATE ONSET ALZHEIMER'S DEMENTIA WITH AGITATION: ICD-10-CM

## 2024-07-11 DIAGNOSIS — H61.22 IMPACTED CERUMEN OF LEFT EAR: ICD-10-CM

## 2024-07-11 PROBLEM — R41.3 MEMORY CHANGES: Status: RESOLVED | Noted: 2021-04-12 | Resolved: 2024-07-11

## 2024-07-11 PROCEDURE — 99999 PR PBB SHADOW E&M-EST. PATIENT-LVL III: CPT | Mod: PBBFAC,HCNC,, | Performed by: FAMILY MEDICINE

## 2024-07-11 RX ORDER — MEMANTINE HYDROCHLORIDE 10 MG/1
5 TABLET ORAL 2 TIMES DAILY
COMMUNITY
End: 2024-07-11

## 2024-07-11 RX ORDER — QUETIAPINE FUMARATE 25 MG/1
25 TABLET, FILM COATED ORAL 2 TIMES DAILY
COMMUNITY
Start: 2024-06-25 | End: 2024-07-25

## 2024-07-11 RX ORDER — MEMANTINE HYDROCHLORIDE 10 MG/1
10 TABLET ORAL 2 TIMES DAILY
COMMUNITY
Start: 2024-06-25 | End: 2024-12-22

## 2024-07-11 RX ORDER — QUETIAPINE FUMARATE 25 MG/1
TABLET, FILM COATED ORAL
COMMUNITY
End: 2024-07-11

## 2024-07-11 NOTE — PROGRESS NOTES
CHIEF COMPLAINT:  This is a 78-year-old male here for preventive health exam.       SUBJECTIVE:  Patient is doing well without complaints.  He was diagnosed with mild Alzheimer's disease by his neurologist and is taking Namenda 10 mg twice day and Seroquel 25 mg twice daily (for behavioral changes). He has hyperlipidemia for which he takes takes Crestor 5 mg daily.  He has a history of mild carotid artery disease and arrhythmia. The patient was seeing his urologist twice yearly but has not been in awhile. He takes trazodone 100 mg nightly for insomnia. He has tinnitus of both ears, especially at night and a past history of shingles.  The patient exercises 2-3 times weekly by going to the gym and running.  He plans to run a half marathon.  Patient is a vegetarian but still eats animal products such as eggs and cheese.  Patient drives, cooks and does his own finances.  He lives with his ailing girlfriend whom he cares for.       Eye exam 2021.  Colonoscopy May 2023 due again in May 2028.  Tdap December 2017.  Prevnar June 2019.  Flu September 2023.  COVID 19 vaccine January, February, December 2021, October 2022.     ROS:  GENERAL: Patient denies fever, chills, night sweats.  Patient denies weight gain or loss. Patient denies anorexia, fatigue, weakness or swollen glands.  SKIN: Patient denies rash or hair loss.  HEENT: Patient denies sore throat, ear pain, hearing loss, nasal congestion, or runny nose. Patient denies visual disturbance, eye irritation or discharge.  Positive tinnitus.  LUNGS: Patient denies cough, wheeze or hemoptysis.  CARDIOVASCULAR: Patient denies chest pain, shortness of breath, palpitations, syncope or lower extremity edema.  GI: Patient denies abdominal pain, nausea, vomiting, diarrhea, constipation, blood in stool or melena.  GENITOURINARY: Patient denies irregular vaginal bleeding.  Patient denies dysuria, frequency, hematuria, nocturia, or urgency.  Positive for incontinence and erectile  dysfunction.  MUSCULOSKELETAL: Patient denies joint pain, swelling, redness or warmth.  NEUROLOGIC: Patient denies headache, vertigo, paresthesias, weakness in limb, dysarthria, dysphagia or abnormality of gait.  PSYCHIATRIC: Patient denies anxiety, depression, or memory loss.     OBJECTIVE:   GENERAL:  Well-developed well-nourished pleasant slightly overweight white male alert and oriented x3, in no acute distress.  Mild cognitive impairment.    SKIN: Clear without rash.  Normal color and tone.  HEENT: Eyes: Clear conjunctivae.  No scleral icterus.  Pupils equal reactive to light and accommodation.  Ears: Left ear canal with cerumen impaction.  Right ear canal clear.  Right TM clear.   Nose: Without congestion.  Pharynx: Without injection or exudates.  NECK: Supple, normal range of motion.  No masses, nodes or enlarged thyroid.  No JVD.  Carotids 2+ and equal.  No bruits.  LUNGS: Clear to auscultation.  Normal respiratory effort.  CARDIOVASCULAR: Regular rhythm, normal S1, S2 without murmur, gallop or rub.  BACK: No CVA or spinal tenderness.  ABDOMEN: Normal appearance.  Active bowel sounds.  Soft, nontender without mass or organomegaly.  No rebound or guarding.  EXTREMITIES: Without cyanosis, clubbing or edema.  Distal pulses 2+ and equal.  Normal range of motion in all extremities.  No joint effusion, erythema or warmth.  NEUROLOGIC:   Cranial nerves II through XII without deficit.  Motor strength equal bilaterally.  Sensation normal to touch.  Deep tendon reflexes 2+ and equal.  Gait without abnormality.  No tremor.  Negative cerebellar signs.    TAMERA:  Declined.    Procedure:  Cerumen removed from left ear canal with cerumen scoop.  Irrigated until clear.  TM clear.    ASSESSMENT:  1. Preventative health care    2. Hyperlipidemia, unspecified hyperlipidemia type    3. 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 Carotid artery disease, unspecified laterality, unspecified type    5. Impacted cerumen  of left ear      PLAN:  1. Weight reduction. Exercise regularly.  2. Age-appropriate counseling.  3. Fasting lab.  4. Continue regular medications.  5. Follow up in 6-12 months.    This note is generated with speech recognition software and is subject to transcription error and sound alike phrases that may be missed by proofreading.

## 2024-07-12 ENCOUNTER — LAB VISIT (OUTPATIENT)
Dept: LAB | Facility: HOSPITAL | Age: 79
End: 2024-07-12
Attending: FAMILY MEDICINE
Payer: MEDICARE

## 2024-07-12 DIAGNOSIS — E78.5 HYPERLIPIDEMIA, UNSPECIFIED HYPERLIPIDEMIA TYPE: Chronic | ICD-10-CM

## 2024-07-12 PROCEDURE — 80061 LIPID PANEL: CPT | Mod: HCNC | Performed by: FAMILY MEDICINE

## 2024-07-12 PROCEDURE — 84443 ASSAY THYROID STIM HORMONE: CPT | Mod: HCNC | Performed by: FAMILY MEDICINE

## 2024-07-12 PROCEDURE — 36415 COLL VENOUS BLD VENIPUNCTURE: CPT | Mod: HCNC,PO | Performed by: FAMILY MEDICINE

## 2024-07-12 PROCEDURE — 85025 COMPLETE CBC W/AUTO DIFF WBC: CPT | Mod: HCNC | Performed by: FAMILY MEDICINE

## 2024-07-12 PROCEDURE — 80053 COMPREHEN METABOLIC PANEL: CPT | Mod: HCNC | Performed by: FAMILY MEDICINE

## 2024-07-13 LAB
ALBUMIN SERPL BCP-MCNC: 3.8 G/DL (ref 3.5–5.2)
ALP SERPL-CCNC: 56 U/L (ref 55–135)
ALT SERPL W/O P-5'-P-CCNC: 25 U/L (ref 10–44)
ANION GAP SERPL CALC-SCNC: 9 MMOL/L (ref 8–16)
AST SERPL-CCNC: 30 U/L (ref 10–40)
BASOPHILS # BLD AUTO: 0.02 K/UL (ref 0–0.2)
BASOPHILS NFR BLD: 0.3 % (ref 0–1.9)
BILIRUB SERPL-MCNC: 0.8 MG/DL (ref 0.1–1)
BUN SERPL-MCNC: 10 MG/DL (ref 8–23)
CALCIUM SERPL-MCNC: 9.4 MG/DL (ref 8.7–10.5)
CHLORIDE SERPL-SCNC: 102 MMOL/L (ref 95–110)
CHOLEST SERPL-MCNC: 191 MG/DL (ref 120–199)
CHOLEST/HDLC SERPL: 2.5 {RATIO} (ref 2–5)
CO2 SERPL-SCNC: 26 MMOL/L (ref 23–29)
CREAT SERPL-MCNC: 1 MG/DL (ref 0.5–1.4)
DIFFERENTIAL METHOD BLD: ABNORMAL
EOSINOPHIL # BLD AUTO: 0.4 K/UL (ref 0–0.5)
EOSINOPHIL NFR BLD: 5.7 % (ref 0–8)
ERYTHROCYTE [DISTWIDTH] IN BLOOD BY AUTOMATED COUNT: 14.5 % (ref 11.5–14.5)
EST. GFR  (NO RACE VARIABLE): >60 ML/MIN/1.73 M^2
GLUCOSE SERPL-MCNC: 81 MG/DL (ref 70–110)
HCT VFR BLD AUTO: 48 % (ref 40–54)
HDLC SERPL-MCNC: 77 MG/DL (ref 40–75)
HDLC SERPL: 40.3 % (ref 20–50)
HGB BLD-MCNC: 15.3 G/DL (ref 14–18)
IMM GRANULOCYTES # BLD AUTO: 0.02 K/UL (ref 0–0.04)
IMM GRANULOCYTES NFR BLD AUTO: 0.3 % (ref 0–0.5)
LDLC SERPL CALC-MCNC: 98.4 MG/DL (ref 63–159)
LYMPHOCYTES # BLD AUTO: 1.9 K/UL (ref 1–4.8)
LYMPHOCYTES NFR BLD: 31.4 % (ref 18–48)
MCH RBC QN AUTO: 29.2 PG (ref 27–31)
MCHC RBC AUTO-ENTMCNC: 31.9 G/DL (ref 32–36)
MCV RBC AUTO: 92 FL (ref 82–98)
MONOCYTES # BLD AUTO: 0.9 K/UL (ref 0.3–1)
MONOCYTES NFR BLD: 14.2 % (ref 4–15)
NEUTROPHILS # BLD AUTO: 2.9 K/UL (ref 1.8–7.7)
NEUTROPHILS NFR BLD: 48.1 % (ref 38–73)
NONHDLC SERPL-MCNC: 114 MG/DL
NRBC BLD-RTO: 0 /100 WBC
PLATELET # BLD AUTO: 204 K/UL (ref 150–450)
PMV BLD AUTO: 10.8 FL (ref 9.2–12.9)
POTASSIUM SERPL-SCNC: 4.1 MMOL/L (ref 3.5–5.1)
PROT SERPL-MCNC: 6.6 G/DL (ref 6–8.4)
RBC # BLD AUTO: 5.24 M/UL (ref 4.6–6.2)
SODIUM SERPL-SCNC: 137 MMOL/L (ref 136–145)
TRIGL SERPL-MCNC: 78 MG/DL (ref 30–150)
TSH SERPL DL<=0.005 MIU/L-ACNC: 1.67 UIU/ML (ref 0.4–4)
WBC # BLD AUTO: 6.12 K/UL (ref 3.9–12.7)

## 2024-07-25 RX ORDER — ROSUVASTATIN CALCIUM 5 MG/1
TABLET, COATED ORAL
Qty: 90 TABLET | Refills: 3 | Status: SHIPPED | OUTPATIENT
Start: 2024-07-25

## 2024-08-14 RX ORDER — TRAZODONE HYDROCHLORIDE 100 MG/1
TABLET ORAL
Qty: 90 TABLET | Refills: 3 | Status: SHIPPED | OUTPATIENT
Start: 2024-08-14

## 2024-08-14 NOTE — TELEPHONE ENCOUNTER
Refill Decision Note   Jose Soto  is requesting a refill authorization.  Brief Assessment and Rationale for Refill:  Approve     Medication Therapy Plan:        Comments:     Note composed:10:49 AM 08/14/2024

## 2024-08-14 NOTE — TELEPHONE ENCOUNTER
No care due was identified.  [facility] Embedded Care Due Messages. Reference number: [id number].   8/14/2024 3:53:16 AM CDT

## 2024-11-07 ENCOUNTER — TELEPHONE (OUTPATIENT)
Dept: FAMILY MEDICINE | Facility: CLINIC | Age: 79
End: 2024-11-07
Payer: MEDICARE

## 2024-11-07 DIAGNOSIS — F02.A11 MILD LATE ONSET ALZHEIMER'S DEMENTIA WITH AGITATION: Primary | Chronic | ICD-10-CM

## 2024-11-07 DIAGNOSIS — G30.1 MILD LATE ONSET ALZHEIMER'S DEMENTIA WITH AGITATION: Primary | Chronic | ICD-10-CM

## 2024-11-07 NOTE — TELEPHONE ENCOUNTER
LOV - 07/11/2024    Who Called:Trini, daughter   Does the patient know what this is regarding?: pt daughter states she need a [facility] referral for dimensional   Would the patient rather a call back or a response via [facility] call   Best Call Back Number:.[phone]   Additional Information:

## 2024-11-07 NOTE — TELEPHONE ENCOUNTER
Patients daughter states that her father has had a change in insurance. He has [facility] insurance now, and needs to be referred to an [facility].    The former doctor no longer takes the insurance. Per the daughter.

## 2024-11-07 NOTE — TELEPHONE ENCOUNTER
----- Message from [employer] sent at 11/7/2024  4:18 PM CST -----  Contact: Trini, daughter  .Type:  Patient Requesting Call    Who Called:Trini, daughter  Does the patient know what this is regarding?: pt daughter states she need a [facility] referral for dimensional  Would the patient rather a call back or a response via [facility]? call  Best Call Back Number:.[phone]  Additional Information:

## 2024-11-08 ENCOUNTER — TELEPHONE (OUTPATIENT)
Dept: FAMILY MEDICINE | Facility: CLINIC | Age: 79
End: 2024-11-08
Payer: MEDICARE

## 2024-11-08 NOTE — TELEPHONE ENCOUNTER
----- Message from Rayshawn sent at 11/8/2024  3:39 PM CST -----  Contact: Trini(daughter)  Trini is calling in regards to she states that her father was scheduled for a neurologist that is in [place]. She said that she did mot want it scheduled right now and that she would like for it to be in April 2025. She will like a call back at the number [phone]

## 2025-01-27 ENCOUNTER — OFFICE VISIT (OUTPATIENT)
Dept: FAMILY MEDICINE | Facility: CLINIC | Age: 80
End: 2025-01-27
Payer: MEDICARE

## 2025-01-27 VITALS
TEMPERATURE: 98 F | WEIGHT: 184.5 LBS | HEIGHT: 70 IN | HEART RATE: 45 BPM | DIASTOLIC BLOOD PRESSURE: 82 MMHG | RESPIRATION RATE: 18 BRPM | OXYGEN SATURATION: 98 % | SYSTOLIC BLOOD PRESSURE: 128 MMHG | BODY MASS INDEX: 26.41 KG/M2
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
## 2025-01-27 PROCEDURE — 99999 PR PBB SHADOW E&M-EST. PATIENT-LVL III: CPT | Mod: PBBFAC,,, | Performed by: INTERNAL MEDICINE

## 2025-01-27 NOTE — PROGRESS NOTES
Patient ID: Jose Soto Jr. is a 79 y.o. male.    Chief Complaint: Fall      History of Present Illness    - Mr. Soto presents for evaluation following a slip and fall during a half marathon, with injuries to the hand, knee, and chest discomfort.    Mr. Soto reports a slip and fall incident during a half marathon at the [place]. He sustained injuries to his hand and knee, which are currently healing but appear dry. Mr. Soto also reports chest discomfort following the fall. The discomfort is described as uncomfortable rather than painful, particularly noticeable when sneezing or getting in and out of bed. Mr. Soto speculates that he may have hit his elbow during the fall, as the discomfort is localized to that area. He denies severe pain or obvious bruising in the chest area. Mr. Soto has not used any specific treatments for his injuries thus far.      ROS:  General: denies fever, denies chills, denies fatigue, denies weight gain, denies weight loss  Eyes: denies vision changes, denies redness, denies discharge  ENT: denies ear pain, denies nasal congestion, denies sore throat  Cardiovascular: denies chest pain, denies palpitations, denies lower extremity edema  Respiratory: denies cough, denies shortness of breath  Gastrointestinal: denies abdominal pain, denies nausea, denies vomiting, denies diarrhea, denies constipation, denies blood in stool  Genitourinary: denies dysuria, denies hematuria, denies frequency  Musculoskeletal: complains of joint pain, denies muscle pain  Skin: denies rash, denies lesion  Neurological: denies headache, denies dizziness, denies numbness, denies tingling  Psychiatric: denies anxiety, denies depression, denies sleep difficulty            Physical Exam    General: In no acute distress.  Head: Normocephalic. Non traumatic.  Eyes: PERRLA. EOMs full. Conjunctivae clear. Fundi grossly normal.  Ears: EACs clear. TMs normal.  Nose: Mucosa pink. Mucosa moist. No  "obstruction.  Throat: Clear. No exudates. No lesions.  Neck: Supple. No masses. No thyromegaly. No bruits.  Chest: Lungs clear. No rales. No rhonchi. No wheezes.  Heart: RRR. No murmurs. No rubs. No gallops.  Abdomen: Soft. No tenderness. No masses. BS normal.  : Normal external genitalia. No lesions. No discharge. No hernias  noted.  Back: Normal curvature. No scoliosis. No tenderness.  Extremities: Warm. Well perfused. No upper extremity edema. No lower extremity edema. FROM. No deformities. No joint erythema.  Neuro: No focal deficits appreciated. Good muscle tone. Normal response to visual stimuli. Normal response to auditory stimuli.  Skin: Normal. No rashes. No lesions noted. REDNESS FROM HEALING. No signs of infection.            Current Outpatient Medications:     ascorbic acid, vitamin C, (VITAMIN C) 500 MG tablet, Take 500 mg by mouth once daily., Disp: , Rfl:     cholecalciferol, vitamin D3, 2,000 unit Cap, Take 1 capsule by mouth once daily., Disp: , Rfl:     coenzyme Q10 400 mg Cap, Take 1 capsule by mouth Daily., Disp: , Rfl:     cyanocobalamin (VITAMIN B-12) 1000 MCG tablet, Take 100 mcg by mouth once daily., Disp: , Rfl:     memantine (NAMENDA) 10 MG Tab, Take 10 mg by mouth 2 (two) times daily., Disp: , Rfl:     multivitamin capsule, Take 2 capsules by mouth once daily., Disp: , Rfl:     QUEtiapine (SEROQUEL) 25 MG Tab, Take 25 mg by mouth 2 (two) times daily., Disp: , Rfl:     rosuvastatin (CRESTOR) 5 MG tablet, TAKE 1 TABLET EVERY DAY (NEED MD APPOINTMENT), Disp: 90 tablet, Rfl: 3    traZODone (DESYREL) 100 MG tablet, TAKE 1 TABLET EVERY EVENING, Disp: 90 tablet, Rfl: 3    vitamin E acetate (VITAMIN E ORAL), Take 180 Units by mouth once daily. , Disp: , Rfl:             VITAL SIGNS:  Vitals:    01/27/25 0919   BP: 128/82   Pulse: (!) 45   Resp: 18   Temp: 98 °F (36.7 °C)   SpO2: 98%   Weight: 83.7 kg (184 lb 8.4 oz)   Height: 5' 10" (1.778 m)       CURRENT BMI:   Body mass index is 26.48 " kg/m².    LABS REVIEWED:    CBC:  Lab Results   Component Value Date    WBC 6.12 07/12/2024    RBC 5.24 07/12/2024    HGB 15.3 07/12/2024    HCT 48.0 07/12/2024    MCV 92 07/12/2024    MCH 29.2 07/12/2024    MCHC 31.9 (L) 07/12/2024    RDW 14.5 07/12/2024     07/12/2024    MPV 10.8 07/12/2024    GRAN 2.9 07/12/2024    GRAN 48.1 07/12/2024    LYMPH 1.9 07/12/2024    LYMPH 31.4 07/12/2024    MONO 0.9 07/12/2024    MONO 14.2 07/12/2024    EOS 0.4 07/12/2024    BASO 0.02 07/12/2024    EOSINOPHIL 5.7 07/12/2024    BASOPHIL 0.3 07/12/2024       CHEMISTRY:  Sodium   Date Value Ref Range Status   07/12/2024 137 136 - 145 mmol/L Final     Potassium   Date Value Ref Range Status   07/12/2024 4.1 3.5 - 5.1 mmol/L Final     Chloride   Date Value Ref Range Status   07/12/2024 102 95 - 110 mmol/L Final     CO2   Date Value Ref Range Status   07/12/2024 26 23 - 29 mmol/L Final     Glucose   Date Value Ref Range Status   07/12/2024 81 70 - 110 mg/dL Final     BUN   Date Value Ref Range Status   07/12/2024 10 8 - 23 mg/dL Final     Creatinine   Date Value Ref Range Status   07/12/2024 1.0 0.5 - 1.4 mg/dL Final     Calcium   Date Value Ref Range Status   07/12/2024 9.4 8.7 - 10.5 mg/dL Final     Total Protein   Date Value Ref Range Status   07/12/2024 6.6 6.0 - 8.4 g/dL Final     Albumin   Date Value Ref Range Status   07/12/2024 3.8 3.5 - 5.2 g/dL Final     Total Bilirubin   Date Value Ref Range Status   07/12/2024 0.8 0.1 - 1.0 mg/dL Final     Comment:     For infants and newborns, interpretation of results should be based  on gestational age, weight and in agreement with clinical  observations.    Premature Infant recommended reference ranges:  Up to 24 hours.............<8.0 mg/dL  Up to 48 hours............<12.0 mg/dL  3-5 days..................<15.0 mg/dL  6-29 days.................<15.0 mg/dL       Alkaline Phosphatase   Date Value Ref Range Status   07/12/2024 56 55 - 135 U/L Final     AST   Date Value Ref Range Status  "  07/12/2024 30 10 - 40 U/L Final     ALT   Date Value Ref Range Status   07/12/2024 25 10 - 44 U/L Final     Anion Gap   Date Value Ref Range Status   07/12/2024 9 8 - 16 mmol/L Final     eGFR   Date Value Ref Range Status   07/12/2024 >60.0 >60 mL/min/1.73 m^2 Final       LIPID PANEL:  Lab Results   Component Value Date    CHOL 191 07/12/2024    CHOL 185 02/28/2023     Lab Results   Component Value Date    TRIG 78 07/12/2024    TRIG 62 02/28/2023     Lab Results   Component Value Date    HDL 77 (H) 07/12/2024    HDL 80 (H) 02/28/2023     Lab Results   Component Value Date    LDLCALC 98.4 07/12/2024    LDLCALC 92.6 02/28/2023       THYROID:  Lab Results   Component Value Date    TSH 1.674 07/12/2024       DIABETES:  No results found for: "LABA1C", "HGBA1C"  No results found for: "MICALBCREAT"    Assessment and Plan     Assessment & Plan    SLIP AND FALL INJURY:  - Assessed the patient's injuries resulting from a slip and fall during a half marathon at the [place].  - Evaluated injuries on the patient's hand, knee, and chest from falling on the right side.  - Recommend rest and advised against running for the time being.  - MrJack Soto to rest and avoid running to allow injuries to heal.  - Recommend limiting physical activity while recovering from fall.    CHEST DISCOMFORT:  - Evaluated the patient's chest discomfort, which occurs when sneezing and during bed transfers.  - Examined the chest and found no visible bruising, but noted that bruising can occur deep in the tissues.  - Determined chest discomfort likely due to muscle strain or bruising from fall, not indicative of fracture.  - Concluded no need for chest XR or further intervention for potential rib injury given absence of severe pain.  - Explained that tiny intercostal muscles can get bruised and require time to heal.  - Recommend allowing time for healing and avoiding strenuous activities.    RIGHT FOREARM ABRASIONS:  - Evaluated abrasions on the " patient's right forearm, noting good healing progress but dryness.  - Assessed that the injuries are not infected.  - Initiated treatment with Vaseline-impregnated gauze for abrasions to maintain wound moisture and promote healing.    RIGHT LOWER LEG AND KNEE ABRASIONS:  - Evaluated abrasions on the patient's right lower leg, including the knee, noting good healing progress but dryness.  - Assessed that the injuries are not infected.  - Initiated treatment with Vaseline-impregnated gauze for abrasions to maintain wound moisture and promote healing.          1. Fall, initial encounter  Comments:  Minor abrasions. healing well, recommend adding Vaseline and gauze to help healing. Ribs possibly bruised but do not suspect fracture           No follow-ups on file.  31 of total time spent on the encounter, which includes face to face time and non-face to face time preparing to see the patient. This includes obtaining and/or reviewing separately obtained history, performing a medically appropriate examination and/or evaluation, and counseling and educating the patient/family/caregiver. Includes documenting clinical information in the electronic or other health record, independently interpreting results (not separately reported) and communicating results to the patient/family/caregiver, with care coordination (not separately reported). Medications, tests and/or procedures ordered as necessary along with referring and communicating with other health professionals (when not separately reported).      This note was generated with the assistance of ambient listening technology. Verbal consent was obtained by the patient and accompanying visitor(s) for the recording of patient appointment to facilitate this note. I attest to having reviewed and edited the generated note for accuracy, though some syntax or spelling errors may persist. Please contact the author of this note for any clarification.

## 2025-02-11 ENCOUNTER — PATIENT MESSAGE (OUTPATIENT)
Dept: FAMILY MEDICINE | Facility: CLINIC | Age: 80
End: 2025-02-11
Payer: MEDICARE

## 2025-02-25 ENCOUNTER — HOSPITAL ENCOUNTER (OUTPATIENT)
Dept: RADIOLOGY | Facility: HOSPITAL | Age: 80
Discharge: HOME | End: 2025-02-25
Attending: REGISTERED NURSE
Payer: MEDICARE

## 2025-02-25 ENCOUNTER — OFFICE VISIT (OUTPATIENT)
Dept: FAMILY MEDICINE | Facility: CLINIC | Age: 80
End: 2025-02-25
Payer: MEDICARE

## 2025-02-25 VITALS
BODY MASS INDEX: 26.08 KG/M2 | OXYGEN SATURATION: 95 % | HEIGHT: 70 IN | SYSTOLIC BLOOD PRESSURE: 130 MMHG | DIASTOLIC BLOOD PRESSURE: 84 MMHG | WEIGHT: 182.19 LBS | HEART RATE: 68 BPM | TEMPERATURE: 96 F

## 2025-02-25 DIAGNOSIS — R09.89 CHEST CONGESTION: ICD-10-CM

## 2025-02-25 DIAGNOSIS — J06.9 VIRAL URI WITH COUGH: ICD-10-CM

## 2025-02-25 DIAGNOSIS — S20.211A RIB CONTUSION, RIGHT, INITIAL ENCOUNTER: ICD-10-CM

## 2025-02-25 DIAGNOSIS — G30.1 MILD LATE ONSET ALZHEIMER'S DEMENTIA WITH AGITATION: ICD-10-CM

## 2025-02-25 DIAGNOSIS — F44.0 DISSOCIATIVE AMNESIA: ICD-10-CM

## 2025-02-25 DIAGNOSIS — F02.A11 MILD LATE ONSET ALZHEIMER'S DEMENTIA WITH AGITATION: ICD-10-CM

## 2025-02-25 DIAGNOSIS — J06.9 VIRAL URI WITH COUGH: Primary | ICD-10-CM

## 2025-02-25 PROCEDURE — 71100 X-RAY EXAM RIBS UNI 2 VIEWS: CPT | Mod: TC,FY,PO,RT

## 2025-02-25 PROCEDURE — 1159F MED LIST DOCD IN RCRD: CPT | Mod: CPTII,S$GLB,, | Performed by: REGISTERED NURSE

## 2025-02-25 PROCEDURE — 3079F DIAST BP 80-89 MM HG: CPT | Mod: CPTII,S$GLB,, | Performed by: REGISTERED NURSE

## 2025-02-25 PROCEDURE — 3288F FALL RISK ASSESSMENT DOCD: CPT | Mod: CPTII,S$GLB,, | Performed by: REGISTERED NURSE

## 2025-02-25 PROCEDURE — 71100 X-RAY EXAM RIBS UNI 2 VIEWS: CPT | Mod: 26,RT,, | Performed by: RADIOLOGY

## 2025-02-25 PROCEDURE — 99214 OFFICE O/P EST MOD 30 MIN: CPT | Mod: S$GLB,,, | Performed by: REGISTERED NURSE

## 2025-02-25 PROCEDURE — 99999 PR PBB SHADOW E&M-EST. PATIENT-LVL IV: CPT | Mod: PBBFAC,,, | Performed by: REGISTERED NURSE

## 2025-02-25 PROCEDURE — 1126F AMNT PAIN NOTED NONE PRSNT: CPT | Mod: CPTII,S$GLB,, | Performed by: REGISTERED NURSE

## 2025-02-25 PROCEDURE — 71046 X-RAY EXAM CHEST 2 VIEWS: CPT | Mod: 26,,, | Performed by: RADIOLOGY

## 2025-02-25 PROCEDURE — 1101F PT FALLS ASSESS-DOCD LE1/YR: CPT | Mod: CPTII,S$GLB,, | Performed by: REGISTERED NURSE

## 2025-02-25 PROCEDURE — 71046 X-RAY EXAM CHEST 2 VIEWS: CPT | Mod: TC,FY,PO

## 2025-02-25 PROCEDURE — G2211 COMPLEX E/M VISIT ADD ON: HCPCS | Mod: S$GLB,,, | Performed by: REGISTERED NURSE

## 2025-02-25 PROCEDURE — 3075F SYST BP GE 130 - 139MM HG: CPT | Mod: CPTII,S$GLB,, | Performed by: REGISTERED NURSE

## 2025-02-25 NOTE — PROGRESS NOTES
Jose Soto Jr.  MRN:  [id number]  79 y.o. male      Patient Care Team:  Angela Tejada MD as PCP - General (Family Medicine)  Vikram Cerda MD as Consulting Physician (Urology)  Bryan Valdez MD as Consulting Physician (Ophthalmology)  Allan Sanchez MD as Consulting Physician (Neurology)      SUBJECTIVE:     CHIEF COMPLAINT:  - Cough and rhinorrhea    HPI:  Mr. Soto has been having symptoms for approximately 1 week. The cough was initially dry but has progressed to productive with phlegm, which he typically swallows. He reports nasal discharge requiring frequent blowing. The cough is described as sounding concerning. He denies fever, though he has not used a thermometer, relying on hand tests which are acknowledged as inaccurate. He denies increased coughing when lying down at night. He has not taken any OTC medications for his symptoms.    On January 19th, he fell during a half marathon, injuring his knee and right chest. While the knee has healed, he describes the right chest as feeling misaligned, particularly when rolling over in bed, though it is not painful. This chest discomfort has persisted since the fall.      Review of Systems   Constitutional:  Negative for chills and fever.   HENT:  Positive for congestion. Negative for ear pain, sinus pain, sore throat and tinnitus.    Respiratory:  Positive for cough and sputum production. Negative for shortness of breath and wheezing.    Cardiovascular:  Positive for chest pain (RT rib area). Negative for palpitations, orthopnea, claudication, leg swelling and PND.   Neurological:  Negative for dizziness and headaches.       Review of patient's allergies indicates:  No Known Allergies      Problem List[1]    Current Outpatient Medications   Medication Instructions    coenzyme Q10 400 mg Cap 1 capsule, Daily    cyanocobalamin (VITAMIN B-12) 100 mcg, Daily    memantine (NAMENDA) 10 mg, 2 times daily    multivitamin capsule 2 capsules, Daily     "QUEtiapine (SEROQUEL) 25 mg, 2 times daily    rosuvastatin (CRESTOR) 5 MG tablet TAKE 1 TABLET EVERY DAY (NEED MD APPOINTMENT)         Past medical, surgical, family and social histories have been reviewed today.      OBJECTIVE:     Vitals:    02/25/25 1504   BP: 130/84   BP Location: Right arm   Patient Position: Sitting   Pulse: 68   Temp: 96.3 °F (35.7 °C)   TempSrc: Tympanic   SpO2: 95%   Weight: 82.6 kg (182 lb 3.4 oz)   Height: 5' 10" (1.778 m)       Physical Exam  Vitals reviewed.   Constitutional:       General: He is not in acute distress.  HENT:      Head: Normocephalic and atraumatic.      Right Ear: Tympanic membrane normal.      Left Ear: Tympanic membrane normal.      Nose: Mucosal edema and congestion present. No rhinorrhea.      Right Sinus: No maxillary sinus tenderness or frontal sinus tenderness.      Left Sinus: No maxillary sinus tenderness or frontal sinus tenderness.      Mouth/Throat:      Mouth: Mucous membranes are moist.      Pharynx: Postnasal drip present. No pharyngeal swelling or posterior oropharyngeal erythema.   Eyes:      Conjunctiva/sclera: Conjunctivae normal.      Pupils: Pupils are equal, round, and reactive to light.   Cardiovascular:      Rate and Rhythm: Normal rate and regular rhythm.   Pulmonary:      Effort: Pulmonary effort is normal.      Breath sounds: Normal breath sounds.   Chest:      Chest wall: Tenderness (mild) present. No mass, deformity, swelling, crepitus or edema. There is no dullness to percussion.       Lymphadenopathy:      Cervical: No cervical adenopathy.   Skin:     Capillary Refill: Capillary refill takes less than 2 seconds.   Neurological:      Mental Status: He is alert and oriented to person, place, and time.   Psychiatric:         Mood and Affect: Mood normal.         Behavior: Behavior normal.         Thought Content: Thought content normal.         Judgment: Judgment normal.         ASSESSMENT:     1. Viral URI with cough ---- acute illness, " symptomatic care.  CXR today due to PNA concerns.  OTC cough/cold med discussed.  -     X-Ray Chest PA And Lateral; Future; Expected date: 02/25/2025    2. Chest congestion ---- see # 1  -     X-Ray Chest PA And Lateral; Future; Expected date: 02/25/2025    3. Rib contusion, right, initial encounter ---- s/p fall/direct hit to right rib area, mildly symptomatic.  XR today.  -     X-Ray Ribs 2 View Right; Future; Expected date: 02/25/2025    4. 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  Managed/followed by Neurology.    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  Contact office back if s/s worsen or linger.  RTC as directed and/or prn.        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

## 2025-02-26 ENCOUNTER — RESULTS FOLLOW-UP (OUTPATIENT)
Dept: FAMILY MEDICINE | Facility: CLINIC | Age: 80
End: 2025-02-26

## 2025-04-03 NOTE — PROGRESS NOTES
"Subjective:      Patient ID: Jose Soto Jr. is a 79 y.o. male.    Chief Complaint:  " Memory Deficits ".     HPI 79 Years old C. Male with PMHx of Dementia / CAD / HLD and others Medical issues   Came with Son and significant other for the evaluation and recommendation of " Memory Deficits ".      Started: > 5 years ago.   Describes: short term memory difficulties.   Timing: constant.   Frequency: daily.   Pain:  none.   Location: CNS.   Family: Brother with Dementia.   Medications: Aricept / Seroquel / Crestor.   Worsen: evening.   Alleviated: morning.   Associated symptoms:  words findings difficulties.   Triggers: none.   Prodrome symptoms: none.   Auras: none.            Referral by PCP.        Was seeing a Neurologist.           The patient is here for memory loss.     The patient is presenting with > 5 years history of memory loss. The patient is accompanied by family.     The main problems the patient has are related to progressive short term memory loss.   For example, the patient would repeat the same question repeatedly. The patient excessively forgets where placed certain things.   The patient also started forgetting names. The patient is driving and denies getting lost.   Confusion around and inside the house. Verbally agitation or aggression.    The patient is not losing personal items and does not put them in odd places.    No trouble remembering the date and time, keeping up with medications and appointments and keeping up with major holidays and political changes.   The patient is independent in handling finances. The patient is still independent with ADLs. No hallucinations or delusions.   No seizures. No language problems. No problems handling tools. No history of strokes. No history of headaches. No history of hypothyroidism.   No history of alcoholism (young onset or old onset). No history of B12 deficiency. No history of depression. No history of bipolar disorder.   No history of Untreated " Syphilis.  No history of HIV infection. No toxic exposures.  No history of traumatic brain injury.  No tremors or abnormal movements. No falls or instability. No urinary incontinence. No change in sleep and appetite. No family history of dementia.     Review of Systems   Neurological:         Memory loss.    All other systems reviewed and are negative.    Objective:     Neurological Exam  Mental Status  Alert. Oriented to person, place, time and situation. Recalls 3 of 3 objects immediately. At 5 minutes recalls 1 of 3 objects. Recalls 2 of 3 objects with prompting. Able to copy figure. Clock drawing is normal. Speech is normal. Language is fluent with no aphasia. Attention and concentration are normal. Fund of knowledge is appropriate for level of education. Apraxia absent.    Cranial Nerves  CN I: Sense of smell is normal.  CN II: Visual acuity is normal. Visual fields full to confrontation.  CN III, IV, VI: Extraocular movements intact bilaterally. Normal lids and orbits bilaterally. Pupils equal round and reactive to light bilaterally.  CN V: Facial sensation is normal.  CN VII: Full and symmetric facial movement.  CN VIII: Hearing is normal.  CN IX, X: Palate elevates symmetrically. Normal gag reflex.  CN XI: Shoulder shrug strength is normal.  CN XII: Tongue midline without atrophy or fasciculations.    Motor  Normal muscle bulk throughout. No fasciculations present. Normal muscle tone. No abnormal involuntary movements. Strength is 5/5 throughout all four extremities.    Sensory  Sensation is intact to light touch, pinprick, vibration and proprioception in all four extremities.    Reflexes                                            Right                      Left  Brachioradialis                    2+                         2+  Biceps                                 2+                         2+  Triceps                                2+                         2+  Finger flex                           2+                          2+  Hamstring                            2+                         2+  Patellar                                2+                         2+  Achilles                                2+                         2+  Right Plantar: downgoing  Left Plantar: downgoing  Jaw jerk absent.  Right pathological reflexes: Edy's absent. Ankle clonus absent.  Left pathological reflexes: Edy's absent. Ankle clonus absent.   Right palmomental absent. Left palmomental absent.    Coordination    Finger-to-nose, rapid alternating movements and heel-to-shin normal bilaterally without dysmetria.    Gait  Normal casual, toe, heel and tandem gait.    Physical Exam  Vitals and nursing note reviewed.   Constitutional:       Appearance: Normal appearance. He is normal weight.   HENT:      Head: Normocephalic and atraumatic.      Nose: Nose normal.      Mouth/Throat:      Mouth: Mucous membranes are moist.      Pharynx: Oropharynx is clear.   Eyes:      General: Lids are normal.      Extraocular Movements: Extraocular movements intact.      Conjunctiva/sclera: Conjunctivae normal.      Pupils: Pupils are equal, round, and reactive to light.   Cardiovascular:      Rate and Rhythm: Normal rate and regular rhythm.      Pulses: Normal pulses.      Heart sounds: Normal heart sounds.   Pulmonary:      Effort: Pulmonary effort is normal.      Breath sounds: Normal breath sounds.   Abdominal:      General: Abdomen is flat. Bowel sounds are normal.      Palpations: Abdomen is soft.   Genitourinary:     Comments: Deferred.   Musculoskeletal:         General: Normal range of motion.      Cervical back: Normal range of motion and neck supple.   Skin:     General: Skin is warm and dry.      Capillary Refill: Capillary refill takes less than 2 seconds.   Neurological:      Mental Status: He is alert. Mental status is at baseline.      Motor: Motor strength is normal.     Coordination: Coordination is intact.      Deep Tendon  "Reflexes:      Reflex Scores:       Tricep reflexes are 2+ on the right side and 2+ on the left side.       Bicep reflexes are 2+ on the right side and 2+ on the left side.       Brachioradialis reflexes are 2+ on the right side and 2+ on the left side.       Patellar reflexes are 2+ on the right side and 2+ on the left side.       Achilles reflexes are 2+ on the right side and 2+ on the left side.  Psychiatric:         Mood and Affect: Mood normal.         Speech: Speech normal.         Behavior: Behavior normal.         Thought Content: Thought content normal.         Judgment: Judgment normal.        04/09/2025:  ETELVINA COGNITIVE ASSESSMENT (MOCA) TOTAL SCORE: 24 / 30 - missed 3 words out of 5 in 5 minutes.      NORMAL-MILD NCD 26-30  MILD DEMENTIA 20-25  MODERATE DEMENTIA 10-19  SEVERE DEMENTIA <10.    DATE  04/09/2025           TOTAL SCORE 24           VISUOSPATIAL EXECUTIVE (5) 5           NAMING (3) 2           ATTENTION (6) 6           LANGUAGE (3) 2           ABSTRACTION(2) 1           RECALL (5) 2           ORIENTATION (6) 6              Assessment:   79 Years old C. Male with PMHX as above came of the evaluation of " Memory Deficits ".   - MCI W/ Memory Loss.   - MAJOR NEUROCOGNITIVE DISORDER.   Plan:   Patient Neurological Assessment is remarkable for short term memory lost.     Namenda - appeared discontinued on his AVS from PCP visit, they do not know why.   CUS:   Lexapro 5 mg PO Q Day.     MRI Brain - 2021 -  No acute intracranial abnormality.   Scattered lesions in the white matter which likely represent changes of chronic small vessel ischemic disease. Report only.     Labs: CBC / CMP / TSH / Lipids panel - 06/ 2024 - non significant abnormalities.     HIV / Folate / Vit B 12 / RPR / ESR - 2021 - no abnormalities.     TSH-T4, FA, B12, HIV, RPR - non significant abnormalities. .    Comprehensive Neuropsychological Testing - will get report, according to family already done     DriveAble to assess " the cognitive aspects of driving.     Blood markers for AD (amyloid levels and tau levels like p-sew330 and p-flo510) will likely to be useful.    (FDA approved Lumipulse G ?-amyloid Ratio). Functional imaging and CSF analyses have proven expensive, invasive and inconvenient.    DISEASE-MODIFYING AGENTS     Explained to the patient and family that medications are intended to slow down the progression (in the early stages of the disease) and not stop it or reverse the disease.   The disease is relentless, progressive and so far, cannot be controlled. Progression includes Cognitive decline,   Behavioral disturbances, and Motor decline as well.     I counseled the patient and family that the rate of progression is extremely variable, and the average (10 years) is an inaccurate measure.     CLASSES:    NMDA RECEPTOR ANTAGONISTS    Will start memantine/Namenda and titrate slowly to 10 mg BID. The side effects include dizziness,   seizures and nightmares and discussed with patient and family.   (Other formulations include Namenda XR)    CHOLINESTERASE INHIBITORS (CEI)    Once stable on Namenda will start donepezil/Aricept and titrate slowly to 10 mg QHS.   The side effects include dizziness, diarrhea and nightmares and discussed with patient and family.    It can rarely cause bradycardia, syncope, and prolonged QT.     If GI symptoms become an issue will try rivastigmine/Exelon patches.   Will obtain EKG before and after Aricept to verify effects on QT interval!  (Other formulations Adlarity TTS 5/10 mg weekly).   Patches are convenient, bypass the GI system but have unintended consequences of being   taken off prematurely or being duplicated accidentally.     ANTI-AMYLOID MONOCLONAL ANTIBODIES (SPECIFICALLY FOR ALZHEIMER-TYPE DEMENTIA)      Aducanumab (Aduhelm), Lecanemab (Leqembi) and Donanemab.     The approval process has been sub optimal with many unknowns and problems.    The benefits are questionable, the risks are  sizeable and the burdern is very high.    The actual cognitive benefits remain unsubstantiated and the risks (including brains welling,   bleeding and even death) may outweigh the questionable benefits.   There are also concerns regarding worsening brain atrophy that need further investigation.     The medications are currently restricted to mild cases of Alzheimer's type dementia only.     Patients with increased risk of bleeding are excluded.      These medications are not for moderate and severe cases.     These medications are not for other types of dementia.     There are no long-term safety data.     The actual cognitive benefits remain unsubstantiated and the risks (including brains welling, bleeding and even death)   may outweigh the questionable benefits.   There are also concerns regarding worsening brain atrophy that need further investigation.     Even if a physician decides to use such medications there are numerous steps that need to be taken   that including special diagnostic methods which include spinal tap and functional imaging, frequent and extensive monitoring.   The method of administration requires infusion setup. The cost and insurance coverage are still unclear.     Based on all the above, our clinic will only consider such options after high quality studies and post marketing (real life)   experience evidence demonstrate significant clinical efficacy and safety that outweigh the risks.   Once the evidence is well-established will make sure our institution is properly setup for administering the medication(s) and monitoring for serious side effects.  The cost and insurance coverage will need to be addressed as well.     MISCELLANEOUS     [facility] study regarding Sildenafil (Viagra) impact on dementia showed possible benefit.   Will wait for high quality prospective double-blinded placebo-controlled trials to make any proper recommendations.     Recommended against the use of OTC  non-FDA evaluated supplements and claims.     SYMPTOMATIC MANAGEMENT-BEHAVIORAL SYMPTOMS AND NON-COGNITIVE SYMPTOMS     OPTIONS INCLUDE :     ANTIDEPRESSANTS CLASS MEDICATIONS    Trazodone 50 mg QHS to help with insomnia. Instructed the family to watch for excessive sedation or paradoxical agitation.   Other options includes Mirtazapine (Remeron).    Sertraline (Zoloft) titration to 100 mg in the morning can mitigate anxiety symptoms.     Bupropion (Wellbutrin) small dose titration  mg in the morning helps with attention and concentration in addition to depression and cravings.    ANTIEPILEPTIC CLASS MEDICATIONS     Lamotrigine (Lamictal) LTG 25 mg BID to help for agitation and mood stabilization and antiepileptic effect.   Other options include: Valproic Acid (Depakote) VPA.    ANTIPSYCHOTIC AND NEUROLEPTIC CLASS MEDICATIONS     Risperdal 1 mg one hour before sunset to assist with sundowning , psychotic symptoms and behavioral disturbances.   Other options include: Aripiprazole (Abilify),    Quetiapine (Seroquel) and  Brexipiprazole (Rixulti).    CANNABINOIDS CLASS MEDICATIONS     Dronabinol (Marinol) (Synthetic Delta-9 THC) titration to 2.5 mg to 5 mg to 10 mg one hour before sunset can help with agitation, anorexia and nausea/vomiting.    HOME CARE AND IN FACILITY CARE     Falling Down Precautions. Gait is affected by the disease as well.     Avoid driving and access to firearms     Incremental 24/Care     Help with finances and decision making.    Join support group.    Proofing the house and use labeling.    Avoid antihistamines and anticholinergics.    Avoid changing routine.    Use written reminders.    Avoid multitasking.    Healthy diet, exercise (physical and cognitive).    Good sleep hygiene.    HERE ARE 10 WAYS TO REDUCE RISK OF DEMENTIA AND SLOW DOWN THE PROGRESSION OF DEMENTIA    1.Be physically active.    2. Avoid smoking and alcohol consumption.    3. Track your numbers. Keep your blood  pressure, cholesterol, blood sugar and weight within recommended ranges.    4. Stay socially connected.    5. Make healthy food choices. Eat a well-balance and healthy diet that rich in cereals, fish, legumes, and vegetables.    6. Reduce stress.     7. Challenge your brain by trying something new, playing games, or learning a new language.    8. Take care of your hearing. Avoid being continuously exposed to loud sounds and wear a hearing aid if hearing does become a problem.    9. Lower risk of falls. Consider installing handrails on all stairs and grab bars in bathrooms.    10. Reduce your exposure to air pollution, such as exposure to exhaust in heavy traffic.     CAREGIVERS COUNSELING     Recommend reading the following books:     The 36-Hour Day and there are many online and printed resources to help caregivers as well.     Alzheimer's Through the Stages.     Dementia with G.R.A.C.E.    PREVENTION OF DELIRIUM     1. Good hydration and avoid electrolyte imbalance  2. Recognize and treat infections immediately especially UTI.  3. Bladder emptying and prevent constipation.   4. Provide stimulating activities and familiar objects  5. Use eyeglasses and hearing aids if needed.   6. Use simple and regular communication about people, current place, and time  7. Mobility and range-of-motion exercises  8. Reduce noise, lighting and avoid sleep interruptions  9. Non-narcotic pain management.  10.Nondrug treatment for sleep problems or anxiety  11. Avoid antihistamines.  12. Avoid narcotics.  13. Avoid benzodiazepines.    HELPFUL STRATEGIES FOR THE FAMILY AND CAREGIVERS     BEHAVIORAL MANAGEMENT STRATEGIES     Remember that you cannot reason with acute psychosis or confusion. Unless there is an immediate safety issue, there is no need to challenge or correct the person.  For example, if a pt is hallucinating, do not argue with the person that what they are seeing is not real. If they are expressing paranoia,   empathize  "gently with their fear, and attempt to redirect them to a different activity.   If the person is particularly stuck on a topic or activity, as long as the activity is safe and is not worsening their agitation, you don't need to intervene.     Communicate calmly, simply, and concisely    Reassure the person that they are safe and you are here to help  Try not to express irritation or anger  Speak quietly and calmly, do not shout or threaten the person. Avoid provocation.   Establish verbal contact and provide orientation and reassurance.  Attempt to identify the patient's wants and feelings, listen to what they are saying, and reflect those wants and feelings back to the patient.  Dont use sarcasm as a weapon    Set clear limits on behavior in a calm voice ("You cannot hit the nurse.")  Offer choices and optimism ("Which toothpaste would you like to use today?")    Redirect the person to an alternative behavior ("Can you come help me with this?)    Avoid saying things like, "We already went over this!" "You can't keep doing this." "I already explained this to you"  Instead, simply nod calmly and acknowledge what the person is saying ("Yes, that makes sense."), and then request their help or company in a different behavior.   Having a mental list of activities the patient enjoys can be helpful with redirection. For example, do they enjoy going for walks? Eating a piece of candy?   If redirection becomes challenging, you can place objects that your family member enjoys strategically in the home in order to use for distraction.   This is particularly useful if there are items that they often talk about or frequently ask you questions about; or if they are visually striking.   Once you focus the person on this object, talk about it briefly until they are calm and then attempt to redirect to a new behavior.   Sometimes activities which are repetitive can be calming, particularly if there is a comforting sensory element. " "For example, pt may enjoy folding soft towels or laundry.    Limit overstimulation    Decrease distractions by turning off the TV, computer, any fluorescent lights that hum, etc.  Ask any casual visitors to leave--the fewer people the better  If the person is very agitated, avoid touching them, and respect their personal space  Sit down and ask the person to sit down also     PREVENTATIVE STRATEGIES     Try to help the patient develop a routine and stick to it  Address all immediate safety issues  Does the person still have access to the car and keys? Take the keys away, or disconnect the car battery.  Are they wandering at night? Install locks on the outside doors. A keypad lock works well. Alarms on bedroom doors can also be helpful.   Are they turning on the stove and risking a fire? If you cannot limit their access to the kitchen, you may need to unplug dangerous devices any time you are not in the room.   If the person is exhibiting poor judgment throughout the day, they likely need someone supervising them at all times.   Do they still have access to significant financial assets? Limit their access to accounts, and consult with a  if necessary.   Identify situations that seem to trigger agitation or a problematic behavior, and modify the person's environment to minimize or eliminate that trigger.   For example, is their behavior worse if they don't get a good night's sleep? Or if they don't eat or drink enough? If so, prioritize those activities and build behavior plans to support them.  Do they get upset about not being allowed to answer the phone when it rings? Put all of the phones in the house on "silent."   Do they become paranoid that certain family members are trying to take advantage of them? Limit contact with the targeted   family member as much as possible, and re-introduce them slowly after some time has passed.   Encourage independence in daily living whenever safely possible  Encourage " collaborative decision-making regarding his care as well as daily activities  Encourage regular physical exercise  Address issues that may be impacting sleep   Ex: Urology consult for frequent nighttime urination, address chronic pain that may be interfering with sleep, moving to a different room   if his roommate snores loudly, make sure the room is a comfortable temperature and he has adequate pillows and blankets  Ensure he gets out into the sunlight at least once per day to encourage regular circadian cycle  No caffeine, sugar, or large meals within two hours of bedtime   Make sure room at night is dark and quiet and minimize nighttime interruptions from staff unless medically necessary   Try to help pt go to sleep and wake up at the same time every day  Monitor closely for worsening psychiatric symptoms (insomnia, social withdrawal, deterioration of personal hygiene, hostility,   confusing or nonsensical speech, paranoia, hallucinations) and intervene promptly. Assess for possible antecedents, modify environment appropriately.    SLEEP HYGIENE     Poor sleep has a negative effect on cognition. Several strategies have been shown to improve sleep:     Caffeine intake in the afternoon and evening, as well as stuffing oneself at supper, can decrease the quality of restful sleep throughout the night.   Bedtime and wake-up times should be consistent every night and morning so the body becomes used to a single routine, even on the weekends.  Engage in daily physical activity, but not 2-3 hours before bedtime.   No technology use (television, computer, iPad) 1-2 hours before bed.   Have a wind down routine (e.g., soft lights in the house, bath before bed, reduced fluid intake, songs, reading, less noise) to promote sleep readiness.   Visit the www.sleepfoundation.org for more strategies.      COGNITIVE HYGIENE     Engage in regular exercise, which increases alertness and arousal and can improve attention and focus.   Consider lower impact exercises, such as yoga or light walking.  Get a good nights sleep, as this can enhance alertness and cognition.  Eat healthy foods and balanced meals. It is notable that research indicates certain nutrients may aid in brain function,   such as B vitamins (especially B6, B12, and folic acid), antioxidants (such as vitamins C and E, and beta carotene), and Omega-3 fatty acids.   Talk with your physician or nutritionist about whats right for you.   Keep your brain active. Find activities to stay mentally active, such as reading, games (cards, checkers), puzzles   (crosswords, Sudoku, jig saw), crafts (models, woodworking), gardening, or participating in activities in the community.  Stay socially engaged. Continue staying active with your family and friends.      FUTURE PLANNING     The patient and caregivers should consider formal arrangements to allow a designated person to make medical and financial decisions for the pt, should he/she become unable to do so.    Options to consider include designating a healthcare proxy, medical and/or financial power of , and completing advanced directives for healthcare decisions and estate planning   (e.g., finalizing a will).  If cost is prohibitive, [facility] (https://[employer].org/) provides free  for individuals with low income.     ADDITIONAL RESOURCES     Al[facility]'s [facility] the [facility] (www.http://alzbr.org) have good local caregiver and patient resources.   Consider resources for support through the Governors Office of Elderly Affairs (http://goea.[place].gov/), [place] Chapter of the Alzheimers Association (www.alz.org/[place]/),   the Family Caregiver [place] (www.caregiver.org), and the American Psychological Association (http://www.apa.org/pi/about/publications/caregivers/consumers/index.aspxconsumers/index.aspx).  For More Information About Hallucinations, Delusions, and Paranoia in  Alzheimer's [facility] Alzheimer's and related Dementias Education and Referral (ADEAR) Center 9-194-367-0269 (toll-free)   adear@ronel.nih.gov www.ronel.nih.gov/alzheimers The National [place] on [facility]'s [facility] Center   offers information and free print publications about Alzheimer's disease and related dementias for families,   caregivers, and health professionals. ADEAR Center staff answer telephone, email,   and written requests and make referrals to local and national resources    Please do not hesitate to contact me with any updates, questions or concerns.    RTC: 1 month.     I spent a total of 45 minutes on the day of the visit.This includes face to face time and non-face to face time preparing to see the patient (eg, review of tests), obtaining and/or reviewing separately obtained history, documenting clinical information in the electronic or other health record,   independently interpreting results and communicating results to the patient/family/caregiver, or care coordinator.    Dustin Hyde MD.  General Neurologist.

## 2025-04-08 ENCOUNTER — OFFICE VISIT (OUTPATIENT)
Dept: FAMILY MEDICINE | Facility: CLINIC | Age: 80
End: 2025-04-08
Payer: MEDICARE

## 2025-04-08 VITALS
RESPIRATION RATE: 20 BRPM | SYSTOLIC BLOOD PRESSURE: 120 MMHG | HEART RATE: 56 BPM | WEIGHT: 185.19 LBS | TEMPERATURE: 96 F | HEIGHT: 70 IN | DIASTOLIC BLOOD PRESSURE: 88 MMHG | BODY MASS INDEX: 26.51 KG/M2 | OXYGEN SATURATION: 99 %

## 2025-04-08 DIAGNOSIS — Z85.828 HISTORY OF SCC (SQUAMOUS CELL CARCINOMA) OF SKIN: ICD-10-CM

## 2025-04-08 DIAGNOSIS — R32 URINARY INCONTINENCE, UNSPECIFIED TYPE: ICD-10-CM

## 2025-04-08 DIAGNOSIS — G47.00 INSOMNIA, UNSPECIFIED TYPE: ICD-10-CM

## 2025-04-08 DIAGNOSIS — R39.198 SLOW URINARY STREAM: ICD-10-CM

## 2025-04-08 DIAGNOSIS — Z00.00 ENCOUNTER FOR MEDICARE ANNUAL WELLNESS EXAM: Primary | ICD-10-CM

## 2025-04-08 DIAGNOSIS — G30.1 MILD LATE ONSET ALZHEIMER'S DEMENTIA WITH AGITATION: Chronic | ICD-10-CM

## 2025-04-08 DIAGNOSIS — E78.49 OTHER HYPERLIPIDEMIA: Chronic | ICD-10-CM

## 2025-04-08 DIAGNOSIS — I77.9 CAROTID ARTERY DISEASE, UNSPECIFIED LATERALITY, UNSPECIFIED TYPE: ICD-10-CM

## 2025-04-08 DIAGNOSIS — F02.A11 MILD LATE ONSET ALZHEIMER'S DEMENTIA WITH AGITATION: Chronic | ICD-10-CM

## 2025-04-08 PROCEDURE — 99999 PR PBB SHADOW E&M-EST. PATIENT-LVL IV: CPT | Mod: PBBFAC,,, | Performed by: NURSE PRACTITIONER

## 2025-04-08 NOTE — PROGRESS NOTES
"  Jose Soto presented for a follow-up Medicare AWV today. The following components were reviewed and updated:    Medical history  Family History  Social history  Allergies and Current Medications  Health Risk Assessment  Health Maintenance  Care Team    **See Completed Assessments for Annual Wellness visit with in the encounter summary    The following assessments were completed:  Depression Screening  Cognitive function Screening  Timed Get Up Test  Whisper Test      Opioid documentation:      Patient does not have a current opioid prescription.          Vitals:    04/08/25 1505   BP: 120/88   Patient Position: Sitting   Pulse: (!) 56   Resp: 20   Temp: (!) 95.5 °F (35.3 °C)   SpO2: 99%   Weight: 84 kg (185 lb 3 oz)   Height: 5' 10" (1.778 m)     Body mass index is 26.57 kg/m².       Physical Exam  Vitals and nursing note reviewed.   Constitutional:       Appearance: Normal appearance.   HENT:      Head: Normocephalic and atraumatic.   Eyes:      Pupils: Pupils are equal, round, and reactive to light.   Cardiovascular:      Rate and Rhythm: Normal rate and regular rhythm.   Pulmonary:      Effort: Pulmonary effort is normal.      Breath sounds: Normal breath sounds.   Musculoskeletal:         General: Normal range of motion.   Skin:     General: Skin is warm.   Neurological:      Mental Status: He is alert. Mental status is at baseline.   Psychiatric:         Mood and Affect: Mood normal.         Behavior: Behavior is slowed.         Thought Content: Thought content normal.         Judgment: Judgment normal.      Comments: forgetful       Current Outpatient Medications   Medication Instructions    coenzyme Q10 400 mg Cap 1 capsule, Daily    cyanocobalamin (VITAMIN B-12) 100 mcg, Daily    memantine (NAMENDA) 10 mg, 2 times daily    multivitamin capsule 2 capsules, Daily    QUEtiapine (SEROQUEL) 25 mg, 2 times daily    rosuvastatin (CRESTOR) 5 MG tablet TAKE 1 TABLET EVERY DAY (NEED MD APPOINTMENT) "           Diagnoses and health risks identified today and associated recommendations/orders:  1. Encounter for Medicare annual wellness exam  decline vaccines      2. 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 Carotid artery disease, unspecified laterality, unspecified type  Chronic and Stable on Crestor/diet and ex. Continue current treatment plan as previously prescribed with your PCP      4. Other hyperlipidemia  Chronic and Stable on Crestor/diet and ex. Continue current treatment plan as previously prescribed with your PCP    5. Insomnia, unspecified type  Chronic and Stable on Seroquel  Schedule to re establish care with [facility] neuro team 4/ 9/ 2024      6. Urinary incontinence, unspecified type  7. Slow urinary stream  Referred to urologist    8. History of SCC (squamous cell carcinoma) of skin    Squamous cell carcinoma of skin of left upper limb, including shoulder 9/ 2024    Scar conditions and fibrosis of skin  Procedures      MI LAYR CLOS WND TRUNK,ARM,LEG 2.6-7.5 CM    MI EXC SKIN MALIG 2.1-3 CM TRUNK,ARM,LEG  Stable and controlled  per pt -states no new lesion f.u with derm as recommended        I offered to discuss advanced care planning, including how to pick a person who would make decisions for you if you were unable to make them for yourself, called a health care power of , and what kind of decisions you might make such as use of life sustaining treatments such as ventilators and tube feeding when faced with a life limiting illness recorded on a living will that they will need to know. (How you want to be cared for as you near the end of your natural life)     X  Patient is unable to engage in a discussion regarding advanced directives due to severe physical  and/or cognitive impairment.    Provided Jose with a 5-10 year written screening schedule and personal prevention plan. Recommendations were developed using the USPSTF age appropriate recommendations. Education, counseling, and referrals were provided as needed.  After Visit Summary printed and given to patient which includes a list of additional screenings\tests needed.    Follow up for Follow up for Annual with PCP.    Regi Dye NP

## 2025-04-08 NOTE — PATIENT INSTRUCTIONS
Counseling and Referral of Other Preventative  (Italic type indicates deductible and co-insurance are waived)    Patient Name: Jose Soto  Today's Date: 4/8/2025    Health Maintenance       Date Due Completion Date    RSV Vaccine (Age 60+ and Pregnant patients) (1 - 1-dose 75+ series) Never done ---    COVID-19 Vaccine (5 - 2024-25 season) 09/01/2024 10/12/2022    Lipid Panel 07/12/2029 7/12/2024    Override on 2/6/2018: Done        No orders of the defined types were placed in this encounter.      The following information is provided to all patients.  This information is to help you find resources for any of the problems found today that may be affecting your health:                  Living healthy guide: www.[facility].[place].gov      Understanding Diabetes: www.diabetes.org      Eating healthy: www.cdc.gov/healthyweight      [facility] home safety checklist: www.cdc.gov/steadi/patient.html      Agency on Aging: www.goea.[place].gov      Alcoholics anonymous (AA): www.aa.org      Physical Activity: www.ronel.nih.gov/vc3lpms      Tobacco use: www.quitwithusla.org

## 2025-04-09 ENCOUNTER — OFFICE VISIT (OUTPATIENT)
Dept: NEUROLOGY | Facility: CLINIC | Age: 80
End: 2025-04-09
Payer: MEDICARE

## 2025-04-09 VITALS
HEIGHT: 70 IN | HEART RATE: 47 BPM | WEIGHT: 185.19 LBS | OXYGEN SATURATION: 99 % | RESPIRATION RATE: 16 BRPM | SYSTOLIC BLOOD PRESSURE: 127 MMHG | BODY MASS INDEX: 26.51 KG/M2 | DIASTOLIC BLOOD PRESSURE: 75 MMHG

## 2025-04-09 DIAGNOSIS — I77.9 CAROTID ARTERY DISEASE, UNSPECIFIED LATERALITY, UNSPECIFIED TYPE: ICD-10-CM

## 2025-04-09 DIAGNOSIS — F02.A11 MILD LATE ONSET ALZHEIMER'S DEMENTIA WITH AGITATION: Primary | Chronic | ICD-10-CM

## 2025-04-09 DIAGNOSIS — G30.1 MILD LATE ONSET ALZHEIMER'S DEMENTIA WITH AGITATION: Primary | Chronic | ICD-10-CM

## 2025-04-09 PROCEDURE — 1126F AMNT PAIN NOTED NONE PRSNT: CPT | Mod: CPTII,S$GLB,, | Performed by: PSYCHIATRY & NEUROLOGY

## 2025-04-09 PROCEDURE — 1159F MED LIST DOCD IN RCRD: CPT | Mod: CPTII,S$GLB,, | Performed by: PSYCHIATRY & NEUROLOGY

## 2025-04-09 PROCEDURE — 3074F SYST BP LT 130 MM HG: CPT | Mod: CPTII,S$GLB,, | Performed by: PSYCHIATRY & NEUROLOGY

## 2025-04-09 PROCEDURE — 99204 OFFICE O/P NEW MOD 45 MIN: CPT | Mod: S$GLB,,, | Performed by: PSYCHIATRY & NEUROLOGY

## 2025-04-09 PROCEDURE — 99999 PR PBB SHADOW E&M-EST. PATIENT-LVL IV: CPT | Mod: PBBFAC,,, | Performed by: PSYCHIATRY & NEUROLOGY

## 2025-04-09 PROCEDURE — 1160F RVW MEDS BY RX/DR IN RCRD: CPT | Mod: CPTII,S$GLB,, | Performed by: PSYCHIATRY & NEUROLOGY

## 2025-04-09 PROCEDURE — 3078F DIAST BP <80 MM HG: CPT | Mod: CPTII,S$GLB,, | Performed by: PSYCHIATRY & NEUROLOGY

## 2025-04-09 RX ORDER — DONEPEZIL HYDROCHLORIDE 10 MG/1
10 TABLET, FILM COATED ORAL NIGHTLY
COMMUNITY
Start: 2025-03-28

## 2025-04-09 RX ORDER — ESCITALOPRAM OXALATE 5 MG/1
5 TABLET ORAL DAILY
Qty: 90 TABLET | Refills: 3 | Status: SHIPPED | OUTPATIENT
Start: 2025-04-09 | End: 2026-04-09

## 2025-04-25 ENCOUNTER — PATIENT MESSAGE (OUTPATIENT)
Dept: UROLOGY | Facility: CLINIC | Age: 80
End: 2025-04-25

## 2025-04-25 ENCOUNTER — OFFICE VISIT (OUTPATIENT)
Dept: UROLOGY | Facility: CLINIC | Age: 80
End: 2025-04-25
Payer: MEDICARE

## 2025-04-25 VITALS
SYSTOLIC BLOOD PRESSURE: 139 MMHG | HEIGHT: 70 IN | WEIGHT: 185.19 LBS | DIASTOLIC BLOOD PRESSURE: 77 MMHG | HEART RATE: 43 BPM | BODY MASS INDEX: 26.51 KG/M2

## 2025-04-25 DIAGNOSIS — R39.198 SLOW URINARY STREAM: ICD-10-CM

## 2025-04-25 DIAGNOSIS — R32 URINARY INCONTINENCE, UNSPECIFIED TYPE: ICD-10-CM

## 2025-04-25 LAB
BILIRUBIN, UA POC OHS: NEGATIVE
BLOOD, UA POC OHS: NEGATIVE
CLARITY, UA POC OHS: CLEAR
COLOR, UA POC OHS: YELLOW
GLUCOSE, UA POC OHS: NEGATIVE
KETONES, UA POC OHS: NEGATIVE
LEUKOCYTES, UA POC OHS: NEGATIVE
NITRITE, UA POC OHS: NEGATIVE
PH, UA POC OHS: 7
POC RESIDUAL URINE VOLUME: 74 ML (ref 0–100)
PROTEIN, UA POC OHS: NEGATIVE
SPECIFIC GRAVITY, UA POC OHS: 1.01
UROBILINOGEN, UA POC OHS: 0.2

## 2025-04-25 PROCEDURE — 99999 PR PBB SHADOW E&M-EST. PATIENT-LVL III: CPT | Mod: PBBFAC,,, | Performed by: UROLOGY

## 2025-04-25 NOTE — PROGRESS NOTES
Chief Complaint:  Prostate check    HPI:   Jose Soto Jr. is a 79 y.o. male that presents today as a referral from SHRUTHI Dye for prostate check.  Patient notes issues with weak stream and nocturia for several years.  Not sure but he thinks he may have seen another urologist in the past.  Believes that he was on medications for this but can not remember what they were.  Not certain they were helpful or not.  He notes would bothersome issues waking up 4-5 times per night.  Currently goes to bed around 10 or 1030 and he takes three medications before bed but also does not limit his fluids prior to bed.  Denies any gross hematuria but does note some mild dysuria at the tip his penis towards the end of urination.  Not currently bothered enough by his symptoms that he wants to take a medication.  IPSS - 0/4/1/4/5/2/5 = 21  QOL - 3(mixed)    PMH:  Past Medical History:   Diagnosis Date    Carotid artery disease     Mild    Dyshidrotic eczema     ED (erectile dysfunction)     History of cardiac arrhythmia     Hyperlipidemia     Mild cognitive impairment     Pancreatic cyst     Pre-op evaluation 4/27/2023    Right inguinal hernia 4/27/2023    Shingles     Tinnitus of both ears     Urinary incontinence        PSH:  Past Surgical History:   Procedure Laterality Date    CATARACT EXTRACTION W/  INTRAOCULAR LENS IMPLANT Bilateral 08/2017    COLONOSCOPY N/A 5/3/2023    Procedure: COLONOSCOPY;  Surgeon: Maria Luisa Brown DO;  Location: [facility];  Service: Endoscopy;  Laterality: N/A;    EYE SURGERY Bilateral 05/2018    Laser treatment     INGUINAL HERNIA REPAIR      ROBOT-ASSISTED LAPAROSCOPIC REPAIR OF INGUINAL HERNIA USING DA KEILA XI Right 5/4/2023    Procedure: XI ROBOTIC REPAIR, HERNIA, INGUINAL;  Surgeon: Maria Luisa Brown DO;  Location: [facility] OR;  Service: General;  Laterality: Right;    TONSILLECTOMY         Family History:  Family History   Problem Relation Name Age of Onset    Colon cancer Mother      Heart  disease Father      No Known Problems Sister      No Known Problems Sister         Social History:  Social History[1]     Review of Systems:  General: No fever, chills  Skin: No rashes  Chest:  Denies cough and sputum production  Heart: Denies chest pain  Resp: Denies dyspnea  Abdomen: Denies diarrhea, abdominal pain, hematemesis, or blood in stool.  Musculoskeletal: No joint stiffness or swelling. Denies back pain.  : see HPI  Neuro: no dizziness or weakness    Allergies:  Patient has no known allergies.    Medications:  Current Medications[2]    Physical Exam:  Vitals:    04/25/25 1203   BP: 139/77   Pulse: (!) 43     Body mass index is 26.57 kg/m².  General: awake, alert, cooperative  Head: NC/AT  Ears: external ears normal  Eyes: sclera normal  Lungs: normal inspiration, NAD  Heart: well-perfused  Abdomen: Soft, NT, ND  : Normal circ'd phallus, meatus normal in size and position, BL testicles palpable, no masses, nontender, no abnormalities of epididymi  TAMERA: Normal rectal tone, no hemorrhoids. Prostate smooth and normal, no nodules 20 gm SV not palpable. Perineum and anus normal.  Lymphatic: groin nodes negative  Skin: The skin is warm and dry  Ext: No c/c/e.  Neuro: grossly intact, AOx3    RADIOLOGY:  No recent relevant imaging available for review.    LABS:  I personally reviewed the following lab values:  Lab Results   Component Value Date    WBC 6.12 07/12/2024    HGB 15.3 07/12/2024    HCT 48.0 07/12/2024     07/12/2024     07/12/2024    K 4.1 07/12/2024     07/12/2024    CREATININE 1.0 07/12/2024    BUN 10 07/12/2024    CO2 26 07/12/2024    TSH 1.674 07/12/2024    PSA 2.1 02/28/2023    CHOL 191 07/12/2024    TRIG 78 07/12/2024    HDL 77 (H) 07/12/2024    ALT 25 07/12/2024    AST 30 07/12/2024       URINALYSIS:  Urinalysis obtained in clinic today specific gravity 1.010 pH seven, negative for all parameters    PVR = 76 mL    Assessment/Plan:   Jose Soto  is a 79 y.o. male  with:    BPH with nocturia - not currently interested in starting a medication for his BPH, recommend that he limit his fluids 2-3 hours prior to bed, follow up one year    Prostate cancer screening - TAMERA normal    Thank you for allowing me the opportunity to participate in this patient's care.     Ceasar Valenzuela MD  Urology         [1]   Social History  Tobacco Use    Smoking status: Never    Smokeless tobacco: Never   Substance Use Topics    Alcohol use: Not Currently     Alcohol/week: 13.0 standard drinks of alcohol     Types: 13 Glasses of wine per week    Drug use: No   [2]   Current Outpatient Medications:     donepeziL (ARICEPT) 10 MG tablet, Take 10 mg by mouth every evening., Disp: , Rfl:     EScitalopram oxalate (LEXAPRO) 5 MG Tab, Take 1 tablet (5 mg total) by mouth once daily., Disp: 90 tablet, Rfl: 3    multivitamin capsule, Take 2 capsules by mouth once daily., Disp: , Rfl:     rosuvastatin (CRESTOR) 5 MG tablet, TAKE 1 TABLET EVERY DAY (NEED MD APPOINTMENT), Disp: 90 tablet, Rfl: 3    QUEtiapine (SEROQUEL) 25 MG Tab, Take 25 mg by mouth 2 (two) times daily., Disp: , Rfl:

## 2025-04-29 ENCOUNTER — TELEPHONE (OUTPATIENT)
Dept: UROLOGY | Facility: CLINIC | Age: 80
End: 2025-04-29
Payer: MEDICARE
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
## 2025-05-08 ENCOUNTER — TELEPHONE (OUTPATIENT)
Dept: UROLOGY | Facility: CLINIC | Age: 80
End: 2025-05-08
Payer: MEDICARE

## 2025-05-08 NOTE — TELEPHONE ENCOUNTER
Tried to call pt to find out more information, there was no answer.       ----- Message from DeDeliveryCheetahkelin sent at 5/8/2025  1:22 PM CDT -----  Contact: [phone]  Type:  RX Refill RequestWho Called: Trevin Refill or New Rx:refill RX Name and Strength:tolterodine tartrateHow is the patient currently taking it? (ex. 1XDay):Is this a 30 day or 90 day RX:Preferred Pharmacy with phone number:[id number]  [place] - [id number] [facility] [place] [address]Bxxuc: [phone] Fax: [phone] or Mail Order:local Ordering Provider:Dr Valenzuela Would the patient rather a call back or a response via [facility]? Call back [facility] Call Back Number:[phone] Information:

## 2025-05-27 ENCOUNTER — PATIENT MESSAGE (OUTPATIENT)
Dept: UROLOGY | Facility: CLINIC | Age: 80
End: 2025-05-27
Payer: MEDICARE

## 2025-05-28 RX ORDER — OXYBUTYNIN CHLORIDE 5 MG/1
5 TABLET, EXTENDED RELEASE ORAL DAILY
Qty: 30 TABLET | Refills: 11 | Status: SHIPPED | OUTPATIENT
Start: 2025-05-28 | End: 2026-05-28

## 2025-06-06 NOTE — PROGRESS NOTES
Subjective:       Patient ID: Jose Soto Jr. is a 79 y.o. male.    Chief Complaint: No chief complaint on file.    HPI The patient presented on 04/ 2025 for evaluation of Memory loss.  Family present ( Daughter / Son / significant other )  New issues: some behavioral issues / irritable burst / incontinence / gait abnormalities.   Memory: mild progression.     Review of Systems   Neurological:         Memory loss.  Behavioral issues.    All other systems reviewed and are negative.        Current Medications[1]    Past Medical History:   Diagnosis Date    Carotid artery disease     Mild    Dyshidrotic eczema     ED (erectile dysfunction)     History of cardiac arrhythmia     Hyperlipidemia     Mild cognitive impairment     Pancreatic cyst     Pre-op evaluation 4/27/2023    Right inguinal hernia 4/27/2023    Shingles     Tinnitus of both ears     Urinary incontinence      Past Surgical History:   Procedure Laterality Date    CATARACT EXTRACTION W/  INTRAOCULAR LENS IMPLANT Bilateral 08/2017    COLONOSCOPY N/A 5/3/2023    Procedure: COLONOSCOPY;  Surgeon: Maria Luisa Brown DO;  Location: [facility] ENDO;  Service: Endoscopy;  Laterality: N/A;    EYE SURGERY Bilateral 05/2018    Laser treatment     INGUINAL HERNIA REPAIR      ROBOT-ASSISTED LAPAROSCOPIC REPAIR OF INGUINAL HERNIA USING DA KEILA XI Right 5/4/2023    Procedure: XI ROBOTIC REPAIR, HERNIA, INGUINAL;  Surgeon: Maria Luisa Brown DO;  Location: [facility] OR;  Service: General;  Laterality: Right;    TONSILLECTOMY       Social History[2]    Past/Current Medical/Surgical History, Past/Current Social History,   Past/Current Family History and Past/Current Medications were reviewed in detail.    Objective:     VITAL SIGNS WERE REVIEWED    GENERAL APPEARANCE:     The patient looks comfortable.    BMI    No signs of respiratory distress.    Normal breathing pattern.    No dysmorphic features    Normal eye contact.       GENERAL MEDICAL EXAM:    HEENT:  Head is  atraumatic normocephalic.     FUNDOSCOPIC (OPHTHALMOSCOPIC) EXAMINATION showed no disc edema (papilledema).      NECK: No JVD. No visible lesions or goiters.     CHEST-CARDIOPULMONARY: No cyanosis. No tachypnea. Normal respiratory effort.    LIJSYHB-[id number]-[id number]: No jaundice. No stomas or lesions. No visible hernias. No catheters.     SKIN, HAIR, NAILS: No pathognomonic skin rash.No neurofibromatosis.   No visible lesions.No stigmata of autoimmune disease. No clubbing.    LIMBS: No varicose veins. No visible swelling.    MUSCULOSKELETAL: No visible deformities.No visible lesions.    Neurological Exam  Mental Status  Alert. Oriented only to person and situation. Speech is normal. Language is fluent with no aphasia. Attention and concentration are normal.    Cranial Nerves  CN II: Visual acuity is normal.    Motor  Normal muscle bulk throughout. No fasciculations present. Normal muscle tone. Strength is 5/5 throughout all four extremities.    Sensory  Sensation is intact to light touch, pinprick, vibration and proprioception in all four extremities. No right-sided hemispatial neglect. No left-sided hemispatial neglect.    Reflexes                                            Right                      Left  Brachioradialis                    2+                         2+  Biceps                                 2+                         2+  Triceps                                2+                         2+  Finger flex                           2+                         2+  Hamstring                            2+                         2+  Patellar                                2+                         2+  Achilles                                2+                         2+    Coordination    Finger-to-nose, rapid alternating movements and heel-to-shin normal bilaterally without dysmetria.    Gait  Normal casual, toe, heel and tandem gait.        Lab Results   Component Value Date    WBC 6.12  07/12/2024    HGB 15.3 07/12/2024    HCT 48.0 07/12/2024    MCV 92 07/12/2024     07/12/2024     Sodium   Date Value Ref Range Status   07/12/2024 137 136 - 145 mmol/L Final     Potassium   Date Value Ref Range Status   07/12/2024 4.1 3.5 - 5.1 mmol/L Final     Chloride   Date Value Ref Range Status   07/12/2024 102 95 - 110 mmol/L Final     CO2   Date Value Ref Range Status   07/12/2024 26 23 - 29 mmol/L Final     Glucose   Date Value Ref Range Status   07/12/2024 81 70 - 110 mg/dL Final     BUN   Date Value Ref Range Status   07/12/2024 10 8 - 23 mg/dL Final     Creatinine   Date Value Ref Range Status   07/12/2024 1.0 0.5 - 1.4 mg/dL Final     Calcium   Date Value Ref Range Status   07/12/2024 9.4 8.7 - 10.5 mg/dL Final     Total Protein   Date Value Ref Range Status   07/12/2024 6.6 6.0 - 8.4 g/dL Final     Albumin   Date Value Ref Range Status   07/12/2024 3.8 3.5 - 5.2 g/dL Final     Total Bilirubin   Date Value Ref Range Status   07/12/2024 0.8 0.1 - 1.0 mg/dL Final     Comment:     For infants and newborns, interpretation of results should be based  on gestational age, weight and in agreement with clinical  observations.    Premature Infant recommended reference ranges:  Up to 24 hours.............<8.0 mg/dL  Up to 48 hours............<12.0 mg/dL  3-5 days..................<15.0 mg/dL  6-29 days.................<15.0 mg/dL       Alkaline Phosphatase   Date Value Ref Range Status   07/12/2024 56 55 - 135 U/L Final     AST   Date Value Ref Range Status   07/12/2024 30 10 - 40 U/L Final     ALT   Date Value Ref Range Status   07/12/2024 25 10 - 44 U/L Final     Anion Gap   Date Value Ref Range Status   07/12/2024 9 8 - 16 mmol/L Final     eGFR if    Date Value Ref Range Status   09/09/2021 >60.0 >60 mL/min/1.73 m^2 Final     eGFR if non    Date Value Ref Range Status   09/09/2021 >60.0 >60 mL/min/1.73 m^2 Final     Comment:     Calculation used to obtain the estimated  "glomerular filtration  rate (eGFR) is the CKD-EPI equation.        No results found for: "DMLHWQPD25"    Lab Results   Component Value Date    TSH 1.674 07/12/2024     No results found in the last 24 hours.    LABORATORY EVALUATION    RADIOLOGY EVALUATION     NEUROPHYSIOLOGY EVALUATION     PATHOLOGY EVALUATION      NEUROCOGNITIVE AND NEUROPSYCHOLOGY EVALUATION     Reviewed the neuroimaging independently     04/09/2025:  ETELVINA COGNITIVE ASSESSMENT (MOCA) TOTAL SCORE: 24 / 30 - missed 3 words out of 5 in 5 minutes. \    Assessment:   79 Years old C. Male with PMHX as above came of the evaluation of " Memory Deficits ".   - MCI W/ Memory Loss.   - MAJOR NEUROCOGNITIVE DISORDER.   Plan:   Family has noticed burst of irritability / verbal aggressiveness / mild progressive memory loss / driving confusion /   gait abnormalities like small shuffling / Urinary incontinence / problems with hygienes.   He saw Urology service - Urinalysis / Bladder Scan / was put on Ditropan.     NPH ? - last MRI Brain 06/ 2024 - There is moderate diffuse parenchymal volume loss with proportionate ventricular enlargement.  Report only.   Will do MRI Brain.     ATN profile.   Low HR - referral to Cardiology service.   Referral to Neuropsychological assessment.   Increase Seroquel to 50 mg Q Hs - for behavioral issues.     Discussed driving privilege with Patient and Family - I told them, my preference is for Patient no to drive any more due to   progressive memory loss and some confusion while driving, also told them that They should sit down with Him to find   alternative for patient to remain as independent as possible.     CUS:   Continue Lexapro 5 mg PO Q Day.      MRI Brain - 2021 -  No acute intracranial abnormality.   Scattered lesions in the white matter which likely represent changes of chronic small vessel ischemic disease. Report only.      Labs: CBC / CMP / TSH / Lipids panel - 06/ 2024 - non significant abnormalities.      HIV / " Folate / Vit B 12 / RPR / ESR - 2021 - no abnormalities.      TSH-T4, FA, B12, HIV, RPR - non significant abnormalities. .     Comprehensive Neuropsychological Testing - will get report, according to family already done      Drive[employer] to assess the cognitive aspects of driving.      Blood markers for AD (amyloid levels and tau levels like p-asd195 and p-ibl297) will likely to be useful.    (FDA approved Lumipulse G ?-amyloid Ratio). Functional imaging and CSF analyses have proven expensive, invasive and inconvenient.    MEDICAL/SURGICAL COMORBIDITIES     All relevant medical comorbidities noted and managed by primary care physician and medical care team.      HEALTHY LIFESTYLE AND PREVENTATIVE CARE    The patient to adhere to the age-appropriate health maintenance guidelines including screening tests and vaccinations.   The patient to adhere to  healthy lifestyle, optimal weight, exercise, healthy diet,   good sleep hygiene and avoiding drugs including smoking, alcohol and recreational drugs.    RTC: 1 month.     Please do not hesitate to contact me with any updates, questions or concerns.    I spent a total of 30 minutes on the day of the visit.This includes face to face time and non-face to face time preparing to see the patient (eg, review of tests),   obtaining and/or reviewing separately obtained history, documenting clinical information in the electronic or other health record,   independently interpreting results and communicating results to the patient/family/caregiver, or care coordinator.    Dustin Hamilton MD  General Neurology.       [1]   Current Outpatient Medications:     donepeziL (ARICEPT) 10 MG tablet, Take 10 mg by mouth every evening., Disp: , Rfl:     EScitalopram oxalate (LEXAPRO) 5 MG Tab, Take 1 tablet (5 mg total) by mouth once daily., Disp: 90 tablet, Rfl: 3    multivitamin capsule, Take 2 capsules by mouth once daily., Disp: , Rfl:     oxybutynin (DITROPAN-XL) 5 MG TR24, Take 1  tablet (5 mg total) by mouth once daily., Disp: 30 tablet, Rfl: 11    QUEtiapine (SEROQUEL) 25 MG Tab, Take 25 mg by mouth 2 (two) times daily., Disp: , Rfl:     rosuvastatin (CRESTOR) 5 MG tablet, TAKE 1 TABLET EVERY DAY (NEED MD APPOINTMENT), Disp: 90 tablet, Rfl: 3  [2]   Social History  Socioeconomic History    Marital status: Single   Tobacco Use    Smoking status: Never    Smokeless tobacco: Never   Substance and Sexual Activity    Alcohol use: Not Currently     Alcohol/week: 13.0 standard drinks of alcohol     Types: 13 Glasses of wine per week    Drug use: No   Social History Narrative    The patient is  and is now living with his girlfriend who is in ill health.  He has 3 adult children.  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     Social Drivers of Health     Financial Resource Strain: Low Risk  (4/8/2025)    Overall Financial Resource Strain (CARDIA)     Difficulty of Paying Living Expenses: Not hard at all   Food Insecurity: No Food Insecurity (4/8/2025)    Hunger Vital Sign     Worried About Running Out of Food in the Last Year: Never true     Ran Out of Food in the Last Year: Never true   Transportation Needs: No Transportation Needs (4/8/2025)    PRAPARE - Transportation     Lack of Transportation (Medical): No     Lack of Transportation (Non-Medical): No   Physical Activity: Insufficiently Active (4/8/2025)    Exercise Vital Sign     Days of Exercise per Week: 3 days     Minutes of Exercise per Session: 20 min   Stress: Stress Concern Present (4/8/2025)    [employer] [place] [employer] - Occupational Stress Questionnaire     Feeling of Stress : To some extent   Housing Stability: Low Risk  (4/8/2025)    Housing Stability Vital Sign     Unable to Pay for Housing in the Last Year: No     Homeless in the Last Year: No

## 2025-06-11 ENCOUNTER — LAB VISIT (OUTPATIENT)
Dept: LAB | Facility: HOSPITAL | Age: 80
End: 2025-06-11
Attending: PSYCHIATRY & NEUROLOGY
Payer: MEDICARE

## 2025-06-11 ENCOUNTER — OFFICE VISIT (OUTPATIENT)
Dept: NEUROLOGY | Facility: CLINIC | Age: 80
End: 2025-06-11
Payer: MEDICARE

## 2025-06-11 VITALS
HEIGHT: 70 IN | HEART RATE: 44 BPM | BODY MASS INDEX: 26.51 KG/M2 | SYSTOLIC BLOOD PRESSURE: 132 MMHG | DIASTOLIC BLOOD PRESSURE: 59 MMHG | WEIGHT: 185.19 LBS

## 2025-06-11 DIAGNOSIS — G30.1 MILD LATE ONSET ALZHEIMER'S DEMENTIA WITH AGITATION: Primary | Chronic | ICD-10-CM

## 2025-06-11 DIAGNOSIS — F02.A11 MILD LATE ONSET ALZHEIMER'S DEMENTIA WITH AGITATION: Chronic | ICD-10-CM

## 2025-06-11 DIAGNOSIS — F02.A11 MILD LATE ONSET ALZHEIMER'S DEMENTIA WITH AGITATION: Primary | Chronic | ICD-10-CM

## 2025-06-11 DIAGNOSIS — R41.3 OTHER AMNESIA: ICD-10-CM

## 2025-06-11 DIAGNOSIS — G30.1 MILD LATE ONSET ALZHEIMER'S DEMENTIA WITH AGITATION: Chronic | ICD-10-CM

## 2025-06-11 PROCEDURE — 99999 PR PBB SHADOW E&M-EST. PATIENT-LVL IV: CPT | Mod: PBBFAC,,, | Performed by: PSYCHIATRY & NEUROLOGY

## 2025-06-11 PROCEDURE — 83520 IMMUNOASSAY QUANT NOS NONAB: CPT

## 2025-06-11 PROCEDURE — 1159F MED LIST DOCD IN RCRD: CPT | Mod: CPTII,S$GLB,, | Performed by: PSYCHIATRY & NEUROLOGY

## 2025-06-11 PROCEDURE — 36415 COLL VENOUS BLD VENIPUNCTURE: CPT

## 2025-06-11 PROCEDURE — 99214 OFFICE O/P EST MOD 30 MIN: CPT | Mod: S$GLB,,, | Performed by: PSYCHIATRY & NEUROLOGY

## 2025-06-11 PROCEDURE — 3078F DIAST BP <80 MM HG: CPT | Mod: CPTII,S$GLB,, | Performed by: PSYCHIATRY & NEUROLOGY

## 2025-06-11 PROCEDURE — 82234 BETA-AMYLOID 1-42 (ABETA 42): CPT

## 2025-06-11 PROCEDURE — 3288F FALL RISK ASSESSMENT DOCD: CPT | Mod: CPTII,S$GLB,, | Performed by: PSYCHIATRY & NEUROLOGY

## 2025-06-11 PROCEDURE — 1126F AMNT PAIN NOTED NONE PRSNT: CPT | Mod: CPTII,S$GLB,, | Performed by: PSYCHIATRY & NEUROLOGY

## 2025-06-11 PROCEDURE — 1160F RVW MEDS BY RX/DR IN RCRD: CPT | Mod: CPTII,S$GLB,, | Performed by: PSYCHIATRY & NEUROLOGY

## 2025-06-11 PROCEDURE — 1100F PTFALLS ASSESS-DOCD GE2>/YR: CPT | Mod: CPTII,S$GLB,, | Performed by: PSYCHIATRY & NEUROLOGY

## 2025-06-11 PROCEDURE — 83519 RIA NONANTIBODY: CPT

## 2025-06-11 PROCEDURE — 3075F SYST BP GE 130 - 139MM HG: CPT | Mod: CPTII,S$GLB,, | Performed by: PSYCHIATRY & NEUROLOGY

## 2025-06-11 PROCEDURE — 83884 ASSAY NEURFLMNT LIGHT CHAIN: CPT

## 2025-06-11 RX ORDER — QUETIAPINE FUMARATE 50 MG/1
50 TABLET, FILM COATED ORAL NIGHTLY
Qty: 30 TABLET | Refills: 6 | Status: SHIPPED | OUTPATIENT
Start: 2025-06-11 | End: 2026-01-07

## 2025-06-13 ENCOUNTER — PATIENT MESSAGE (OUTPATIENT)
Dept: NEUROLOGY | Facility: CLINIC | Age: 80
End: 2025-06-13
Payer: MEDICARE

## 2025-06-13 LAB — NEUROFILAMENT LIGHT CHAIN, PLASMA: 18.7 PG/ML

## 2025-06-16 LAB — LC PHOSPHO-TAU (181P): 0.64 PG/ML (ref 0–0.97)

## 2025-06-18 LAB
ABETA 42/40 RATIO: 0.16
ABETA40: 265 PG/ML
ABETA42: 42 PG/ML

## 2025-06-25 ENCOUNTER — PATIENT MESSAGE (OUTPATIENT)
Dept: FAMILY MEDICINE | Facility: CLINIC | Age: 80
End: 2025-06-25
Payer: MEDICARE

## 2025-06-26 DIAGNOSIS — E78.49 OTHER HYPERLIPIDEMIA: Primary | Chronic | ICD-10-CM

## 2025-06-27 ENCOUNTER — OFFICE VISIT (OUTPATIENT)
Dept: CARDIOLOGY | Facility: CLINIC | Age: 80
End: 2025-06-27
Payer: MEDICARE

## 2025-06-27 ENCOUNTER — HOSPITAL ENCOUNTER (OUTPATIENT)
Dept: CARDIOLOGY | Facility: HOSPITAL | Age: 80
Discharge: HOME | End: 2025-06-27
Attending: INTERNAL MEDICINE
Payer: MEDICARE

## 2025-06-27 VITALS
RESPIRATION RATE: 16 BRPM | WEIGHT: 192 LBS | HEART RATE: 51 BPM | BODY MASS INDEX: 27.49 KG/M2 | DIASTOLIC BLOOD PRESSURE: 72 MMHG | HEIGHT: 70 IN | SYSTOLIC BLOOD PRESSURE: 139 MMHG | OXYGEN SATURATION: 97 %

## 2025-06-27 DIAGNOSIS — E78.2 MIXED HYPERLIPIDEMIA: Chronic | ICD-10-CM

## 2025-06-27 DIAGNOSIS — I65.29 STENOSIS OF CAROTID ARTERY, UNSPECIFIED LATERALITY: ICD-10-CM

## 2025-06-27 DIAGNOSIS — E78.49 OTHER HYPERLIPIDEMIA: Chronic | ICD-10-CM

## 2025-06-27 DIAGNOSIS — R00.1 BRADYCARDIA: Primary | ICD-10-CM

## 2025-06-27 LAB
OHS QRS DURATION: 94 MS
OHS QTC CALCULATION: 407 MS

## 2025-06-27 PROCEDURE — 99999 PR PBB SHADOW E&M-EST. PATIENT-LVL III: CPT | Mod: PBBFAC,,, | Performed by: INTERNAL MEDICINE

## 2025-06-27 PROCEDURE — 93005 ELECTROCARDIOGRAM TRACING: CPT

## 2025-06-27 PROCEDURE — 93010 ELECTROCARDIOGRAM REPORT: CPT | Mod: ,,, | Performed by: INTERNAL MEDICINE

## 2025-06-27 NOTE — PROGRESS NOTES
Subjective:   Patient ID:  Jose Soto Jr. is a 79 y.o. male who presents for follow-up of No chief complaint on file.  Pt referred for bradycardia. Pt asymptomatic. Patient denies CP, angina or anginal equivalent.  EKG sinus bradycardia 45 bpm    HPI  Hyperlipidemia  This is a chronic problem. The current episode started more than 1 year ago. The problem is controlled. Pertinent negatives include no chest pain or shortness of breath. Current antihyperlipidemic treatment includes diet. The current treatment provides moderate improvement of lipids. There are no compliance problems.   Review of Systems   Constitutional: Negative. Negative for weight gain.   HENT: Negative.     Eyes: Negative.    Cardiovascular: Negative.  Negative for chest pain, leg swelling and palpitations.   Respiratory: Negative.  Negative for shortness of breath.    Endocrine: Negative.    Hematologic/Lymphatic: Negative.    Skin: Negative.    Musculoskeletal:  Negative for muscle weakness.   Gastrointestinal: Negative.    Genitourinary: Negative.    Neurological: Negative.  Negative for dizziness.   Psychiatric/Behavioral: Negative.     Allergic/Immunologic: Negative.    All other systems reviewed and are negative.    Family History   Problem Relation Name Age of Onset    Heart disease Father      Colon cancer Mother      No Known Problems Sister      No Known Problems Sister      Prostate cancer Brother ESTELLA     Dementia Other CE      Past Medical History:   Diagnosis Date    Carotid artery disease     Mild    Dyshidrotic eczema     ED (erectile dysfunction)     History of cardiac arrhythmia     Hyperlipidemia     Mild cognitive impairment     Pancreatic cyst     Pre-op evaluation 4/27/2023    Right inguinal hernia 4/27/2023    Shingles     Tinnitus of both ears     Urinary incontinence      Social History[1]  Medications Ordered Prior to Encounter[2]  Review of patient's allergies indicates:  No Known Allergies    Objective:      Physical Exam  Vitals and nursing note reviewed.   Constitutional:       Appearance: He is well-developed.   HENT:      Head: Normocephalic and atraumatic.   Eyes:      Conjunctiva/sclera: Conjunctivae normal.      Pupils: Pupils are equal, round, and reactive to light.   Cardiovascular:      Rate and Rhythm: Normal rate and regular rhythm.      Pulses: Intact distal pulses.      Heart sounds: Normal heart sounds.   Pulmonary:      Effort: Pulmonary effort is normal.      Breath sounds: Normal breath sounds.   Abdominal:      General: Bowel sounds are normal.      Palpations: Abdomen is soft.   Musculoskeletal:      Cervical back: Normal range of motion and neck supple.   Skin:     General: Skin is warm and dry.   Neurological:      Mental Status: He is alert and oriented to person, place, and time.         Assessment:     1. Bradycardia    2. Mixed hyperlipidemia    3. Stenosis of carotid artery, unspecified laterality        Plan:     Bradycardia    Mixed hyperlipidemia    Stenosis of carotid artery, unspecified laterality      Stop ditropan  Holter, echo       [1]   Social History  Socioeconomic History    Marital status: Single   Tobacco Use    Smoking status: Never    Smokeless tobacco: Never   Substance and Sexual Activity    Alcohol use: Yes     Alcohol/week: 13.0 standard drinks of alcohol     Types: 13 Glasses of wine per week    Drug use: No    Sexual activity: Not Currently   Social History Narrative    The patient is  and is now living with his girlfriend who is in ill health.  He has 3 adult children.  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     Social Drivers of Health     Financial Resource Strain: Low Risk  (4/8/2025)    Overall Financial Resource Strain (CARDIA)     Difficulty of Paying Living Expenses: Not hard at all   Food Insecurity: No Food Insecurity (4/8/2025)    Hunger Vital Sign     Worried About Running Out of Food in the Last Year: Never true     Ran Out  of Food in the Last Year: Never true   Transportation Needs: No Transportation Needs (4/8/2025)    PRAPARE - Transportation     Lack of Transportation (Medical): No     Lack of Transportation (Non-Medical): No   Physical Activity: Insufficiently Active (4/8/2025)    Exercise Vital Sign     Days of Exercise per Week: 3 days     Minutes of Exercise per Session: 20 min   Stress: Stress Concern Present (4/8/2025)    [employer] [place] [employer] - Occupational Stress Questionnaire     Feeling of Stress : To some extent   Housing Stability: Low Risk  (4/8/2025)    Housing Stability Vital Sign     Unable to Pay for Housing in the Last Year: No     Homeless in the Last Year: No   [2]   Current Outpatient Medications on File Prior to Visit   Medication Sig Dispense Refill    donepeziL (ARICEPT) 10 MG tablet Take 10 mg by mouth every evening.      EScitalopram oxalate (LEXAPRO) 5 MG Tab Take 1 tablet (5 mg total) by mouth once daily. 90 tablet 3    oxybutynin (DITROPAN-XL) 5 MG TR24 Take 1 tablet (5 mg total) by mouth once daily. 30 tablet 11    QUEtiapine (SEROQUEL) 50 MG tablet Take 1 tablet (50 mg total) by mouth every evening. 30 tablet 6    rosuvastatin (CRESTOR) 5 MG tablet TAKE 1 TABLET EVERY DAY (NEED MD APPOINTMENT) 90 tablet 3     No current facility-administered medications on file prior to visit.

## 2025-07-07 ENCOUNTER — HOSPITAL ENCOUNTER (OUTPATIENT)
Dept: CARDIOLOGY | Facility: HOSPITAL | Age: 80
Discharge: HOME | End: 2025-07-07
Attending: INTERNAL MEDICINE
Payer: MEDICARE

## 2025-07-07 VITALS
BODY MASS INDEX: 27.49 KG/M2 | SYSTOLIC BLOOD PRESSURE: 139 MMHG | DIASTOLIC BLOOD PRESSURE: 72 MMHG | WEIGHT: 192 LBS | HEIGHT: 70 IN

## 2025-07-07 DIAGNOSIS — R00.1 BRADYCARDIA: ICD-10-CM

## 2025-07-07 LAB
AORTIC ROOT ANNULUS: 3.3 CM
AORTIC SIZE INDEX (SOV): 1.5 CM/M2
AORTIC SIZE INDEX: 1.7 CM/M2
ASCENDING AORTA: 3.4 CM
AV INDEX (PROSTH): 0.98
AV MEAN GRADIENT: 3 MMHG
AV PEAK GRADIENT: 5 MMHG
AV VALVE AREA BY VELOCITY RATIO: 3.4 CM²
AV VALVE AREA: 3.1 CM²
AV VELOCITY RATIO: 1.09
BSA FOR ECHO PROCEDURE: 2.07 M2
CV ECHO LV RWT: 0.5 CM
DOP CALC AO PEAK VEL: 1.1 M/S
DOP CALC AO VTI: 26.1 CM
DOP CALC LVOT AREA: 3.1 CM2
DOP CALC LVOT DIAMETER: 2 CM
DOP CALC LVOT PEAK VEL: 1.2 M/S
DOP CALC LVOT STROKE VOLUME: 80.7 CM3
DOP CALC RVOT PEAK VEL: 0.53 M/S
DOP CALC RVOT VTI: 14.2 CM
DOP CALCLVOT PEAK VEL VTI: 25.7 CM
E WAVE DECELERATION TIME: 237 MSEC
E/A RATIO: 0.89
E/E' RATIO: 10 M/S
ECHO LV POSTERIOR WALL: 1.1 CM (ref 0.6–1.1)
FRACTIONAL SHORTENING: 29.5 % (ref 28–44)
INTERVENTRICULAR SEPTUM: 1.2 CM (ref 0.6–1.1)
IVRT: 63 MSEC
LA MAJOR: 5.6 CM
LA MINOR: 5.6 CM
LA WIDTH: 4.2 CM
LEFT ATRIUM AREA SYSTOLIC (APICAL 2 CHAMBER): 23.12 CM2
LEFT ATRIUM AREA SYSTOLIC (APICAL 4 CHAMBER): 22.58 CM2
LEFT ATRIUM SIZE: 3.6 CM
LEFT ATRIUM VOLUME INDEX MOD: 35 ML/M2
LEFT ATRIUM VOLUME INDEX: 35 ML/M2
LEFT ATRIUM VOLUME MOD: 71 ML
LEFT ATRIUM VOLUME: 72 CM3
LEFT INTERNAL DIMENSION IN SYSTOLE: 3.1 CM (ref 2.1–4)
LEFT VENTRICLE DIASTOLIC VOLUME INDEX: 43.9 ML/M2
LEFT VENTRICLE DIASTOLIC VOLUME: 90 ML
LEFT VENTRICLE END SYSTOLIC VOLUME APICAL 2 CHAMBER: 72.18 ML
LEFT VENTRICLE END SYSTOLIC VOLUME APICAL 4 CHAMBER: 67.92 ML
LEFT VENTRICLE MASS INDEX: 87.8 G/M2
LEFT VENTRICLE SYSTOLIC VOLUME INDEX: 17.6 ML/M2
LEFT VENTRICLE SYSTOLIC VOLUME: 36 ML
LEFT VENTRICULAR INTERNAL DIMENSION IN DIASTOLE: 4.4 CM (ref 3.5–6)
LEFT VENTRICULAR MASS: 180 G
LV LATERAL E/E' RATIO: 10.4 M/S
LV SEPTAL E/E' RATIO: 10.4 M/S
LVED V (TEICH): 89.74 ML
LVES V (TEICH): 36.48 ML
LVOT MG: 2.59 MMHG
LVOT MV: 0.74 CM/S
MV PEAK A VEL: 0.82 M/S
MV PEAK E VEL: 0.73 M/S
MV STENOSIS PRESSURE HALF TIME: 68.66 MS
MV VALVE AREA P 1/2 METHOD: 3.2 CM2
OHS CV RV/LV RATIO: 0.77 CM
PISA TR MAX VEL: 2.7 M/S
PULM VEIN S/D RATIO: 0.93
PV MEAN GRADIENT: 1 MMHG
PV MV: 1.2 M/S
PV PEAK D VEL: 0.59 M/S
PV PEAK GRADIENT: 13 MMHG
PV PEAK S VEL: 0.55 M/S
PV PEAK VELOCITY: 1.78 M/S
RA MAJOR: 5.39 CM
RA PRESSURE ESTIMATED: 3 MMHG
RA WIDTH: 4.72 CM
RIGHT VENTRICLE DIASTOLIC BASEL DIMENSION: 3.4 CM
RIGHT VENTRICULAR END-DIASTOLIC DIMENSION: 3.36 CM
RV TB RVSP: 6 MMHG
SINUS: 3.1 CM
STJ: 2.6 CM
TDI LATERAL: 0.07 M/S
TDI SEPTAL: 0.07 M/S
TDI: 0.07 M/S
TR MAX PG: 29 MMHG
TRICUSPID ANNULAR PLANE SYSTOLIC EXCURSION: 2.7 CM
TV REST PULMONARY ARTERY PRESSURE: 32 MMHG
Z-SCORE OF LEFT VENTRICULAR DIMENSION IN END DIASTOLE: -3.35
Z-SCORE OF LEFT VENTRICULAR DIMENSION IN END SYSTOLE: -1.54

## 2025-07-07 PROCEDURE — 93227 XTRNL ECG REC<48 HR R&I: CPT | Mod: ,,, | Performed by: INTERNAL MEDICINE

## 2025-07-07 PROCEDURE — 93306 TTE W/DOPPLER COMPLETE: CPT

## 2025-07-07 PROCEDURE — 93306 TTE W/DOPPLER COMPLETE: CPT | Mod: 26,,, | Performed by: INTERNAL MEDICINE

## 2025-07-07 PROCEDURE — 93226 XTRNL ECG REC<48 HR SCAN A/R: CPT

## 2025-07-08 LAB
OHS CV EVENT MONITOR DAY: 0
OHS CV HOLTER LENGTH DECIMAL HOURS: 23.98
OHS CV HOLTER LENGTH HOURS: 23
OHS CV HOLTER LENGTH MINUTES: 59
OHS CV HOLTER SINUS AVERAGE HR: 50
OHS CV HOLTER SINUS MAX HR: 107
OHS CV HOLTER SINUS MIN HR: 34

## 2025-07-14 ENCOUNTER — HOSPITAL ENCOUNTER (OUTPATIENT)
Dept: RADIOLOGY | Facility: HOSPITAL | Age: 80
Discharge: HOME | End: 2025-07-14
Attending: PSYCHIATRY & NEUROLOGY
Payer: MEDICARE

## 2025-07-14 DIAGNOSIS — R41.3 OTHER AMNESIA: ICD-10-CM

## 2025-07-14 PROCEDURE — 70551 MRI BRAIN STEM W/O DYE: CPT | Mod: TC

## 2025-07-14 PROCEDURE — 70551 MRI BRAIN STEM W/O DYE: CPT | Mod: 26,,, | Performed by: RADIOLOGY

## 2025-07-16 ENCOUNTER — HOSPITAL ENCOUNTER (OUTPATIENT)
Dept: CARDIOLOGY | Facility: HOSPITAL | Age: 80
Discharge: HOME | End: 2025-07-16
Payer: MEDICARE

## 2025-07-16 ENCOUNTER — PATIENT MESSAGE (OUTPATIENT)
Dept: CARDIOLOGY | Facility: HOSPITAL | Age: 80
End: 2025-07-16
Payer: MEDICARE

## 2025-07-16 ENCOUNTER — TELEPHONE (OUTPATIENT)
Dept: CARDIOLOGY | Facility: CLINIC | Age: 80
End: 2025-07-16
Payer: MEDICARE

## 2025-07-16 ENCOUNTER — OFFICE VISIT (OUTPATIENT)
Dept: CARDIOLOGY | Facility: CLINIC | Age: 80
End: 2025-07-16
Payer: MEDICARE

## 2025-07-16 VITALS
WEIGHT: 194 LBS | SYSTOLIC BLOOD PRESSURE: 144 MMHG | DIASTOLIC BLOOD PRESSURE: 80 MMHG | HEIGHT: 70 IN | BODY MASS INDEX: 27.77 KG/M2 | HEART RATE: 52 BPM | OXYGEN SATURATION: 98 %

## 2025-07-16 DIAGNOSIS — E78.5 HYPERLIPIDEMIA, UNSPECIFIED HYPERLIPIDEMIA TYPE: Primary | ICD-10-CM

## 2025-07-16 DIAGNOSIS — I11.9 HYPERTENSIVE HEART DISEASE WITHOUT HEART FAILURE: ICD-10-CM

## 2025-07-16 DIAGNOSIS — G47.30 SLEEP APNEA, UNSPECIFIED TYPE: ICD-10-CM

## 2025-07-16 DIAGNOSIS — I45.9 SKIPPED BEATS: Primary | ICD-10-CM

## 2025-07-16 DIAGNOSIS — E78.5 HYPERLIPIDEMIA, UNSPECIFIED HYPERLIPIDEMIA TYPE: ICD-10-CM

## 2025-07-16 DIAGNOSIS — R00.1 BRADYCARDIA: ICD-10-CM

## 2025-07-16 PROCEDURE — 99204 OFFICE O/P NEW MOD 45 MIN: CPT | Mod: S$GLB,,, | Performed by: NURSE PRACTITIONER

## 2025-07-16 PROCEDURE — 3077F SYST BP >= 140 MM HG: CPT | Mod: CPTII,S$GLB,, | Performed by: NURSE PRACTITIONER

## 2025-07-16 PROCEDURE — 3079F DIAST BP 80-89 MM HG: CPT | Mod: CPTII,S$GLB,, | Performed by: NURSE PRACTITIONER

## 2025-07-16 PROCEDURE — 93005 ELECTROCARDIOGRAM TRACING: CPT

## 2025-07-16 PROCEDURE — 93010 ELECTROCARDIOGRAM REPORT: CPT | Mod: ,,, | Performed by: INTERNAL MEDICINE

## 2025-07-16 PROCEDURE — 1159F MED LIST DOCD IN RCRD: CPT | Mod: CPTII,S$GLB,, | Performed by: NURSE PRACTITIONER

## 2025-07-16 PROCEDURE — 99999 PR PBB SHADOW E&M-EST. PATIENT-LVL III: CPT | Mod: PBBFAC,,, | Performed by: NURSE PRACTITIONER

## 2025-07-16 PROCEDURE — 1101F PT FALLS ASSESS-DOCD LE1/YR: CPT | Mod: CPTII,S$GLB,, | Performed by: NURSE PRACTITIONER

## 2025-07-16 PROCEDURE — 1126F AMNT PAIN NOTED NONE PRSNT: CPT | Mod: CPTII,S$GLB,, | Performed by: NURSE PRACTITIONER

## 2025-07-16 PROCEDURE — 3288F FALL RISK ASSESSMENT DOCD: CPT | Mod: CPTII,S$GLB,, | Performed by: NURSE PRACTITIONER

## 2025-07-16 NOTE — PROGRESS NOTES
Subjective:   Patient ID:  Jose Soto Jr. is a 79 y.o. male who presents for evaluation of Bradycardia      HPI    Jose Soto Jr. Is a 79 year old male who presents to Arrhythmia clinic for abnormal holter monitor results. His current medical conditions include bradycardia, MCI, mild carotid artery disease, urinary incontinence.     He does have stigmata for SYLVIA and given skipped beats on recent holter monitor.     He has been very active running, jogging and sprints a few times per week. He is very active and goes for walks regularly without any issues.     No dizziness, LH, near syncope or syncopal events.     Reports compliance with medications and dietary restrictions.     EKG_ NSR, FDAVB HR 59 Alecia 220 ms QTc 455 ms      Past Medical History:   Diagnosis Date    Carotid artery disease     Mild    Dyshidrotic eczema     ED (erectile dysfunction)     History of cardiac arrhythmia     Hyperlipidemia     Mild cognitive impairment     Pancreatic cyst     Pre-op evaluation 4/27/2023    Right inguinal hernia 4/27/2023    Shingles     Tinnitus of both ears     Urinary incontinence        Past Surgical History:   Procedure Laterality Date    CATARACT EXTRACTION W/  INTRAOCULAR LENS IMPLANT Bilateral 08/2017    COLONOSCOPY N/A 5/3/2023    Procedure: COLONOSCOPY;  Surgeon: Maria Luisa Brown DO;  Location: [facility] ENDO;  Service: Endoscopy;  Laterality: N/A;    EYE SURGERY Bilateral 05/2018    Laser treatment     INGUINAL HERNIA REPAIR      ROBOT-ASSISTED LAPAROSCOPIC REPAIR OF INGUINAL HERNIA USING DA KEILA XI Right 5/4/2023    Procedure: XI ROBOTIC REPAIR, HERNIA, INGUINAL;  Surgeon: Maria Luisa Brown DO;  Location: [facility] OR;  Service: General;  Laterality: Right;    TONSILLECTOMY         Social History[1]    Family History   Problem Relation Name Age of Onset    Heart disease Father      Colon cancer Mother      No Known Problems Sister      No Known Problems Sister      Prostate cancer Brother ESTELLA      Dementia Other CE        Wt Readings from Last 3 Encounters:   07/16/25 88 kg (194 lb 0.1 oz)   07/07/25 87.1 kg (192 lb)   06/27/25 87.1 kg (192 lb 0.3 oz)     Temp Readings from Last 3 Encounters:   04/08/25 (!) 95.5 °F (35.3 °C)   02/25/25 96.3 °F (35.7 °C) (Tympanic)   01/27/25 98 °F (36.7 °C)     BP Readings from Last 3 Encounters:   07/16/25 (!) 144/80   07/07/25 139/72   06/27/25 139/72     Pulse Readings from Last 3 Encounters:   07/16/25 (!) 52   06/27/25 (!) 51   06/11/25 (!) 44       Medications Ordered Prior to Encounter[2]    Holter monitor - 24 hour  Result Date: 7/8/2025    The predominant rhythm is sinus.    Sinus rhythm with heart rates varying between 34 and 107 BPM with an   average of 50BPM.    Monitoring started at 10:14 AM and continued for 23 hr 59 min. The average   heart rate was 50 BPM. The minimum heart rate was 34 BPM, occurring at   5:04:22 PM. The maximum heart rate was 107 BPM, occurring at 5:10:00 PM.     Ventricular ectopic activity consisted of 45 beats, of which, 6 were in   single PVCs, 1 was in interpolated PVCs, 11 were single VEs, 27 were in   bigeminy.     The patient's rhythm included 22 hr 1 min 12 sec of bradycardia. The   slowest single episode of bradycardia occurred at 4:59:30 PM, lasting 8   min 30 sec, with minimum heart rate of 34 BPM.     The patient's rhythm included 49 sec of tachycardia. The fastest single   episode of tachycardia occurred at 5:09:57 PM, lasting 8 sec, with maximum   heart rate of 107 BPM.     Supraventricular ectopic activity consisted of 152 beats, of which, 6 were   in atrial couplets, 74 were late beats, 72 were single PACs.     There were 23 dropped beats. The longest R-R interval was 2.2 seconds   occurring at 2:05:42 PM. The longest N-N interval was 2.2 seconds   occurring at 2:05:42 PM.          Results for orders placed during the hospital encounter of 07/07/25    Echo    Interpretation Summary    Left Ventricle: The left ventricle is  normal in size. Mildly increased wall thickness. There is concentric remodeling. There is normal systolic function with a visually estimated ejection fraction of 60 - 65%. Grade I diastolic dysfunction.    Right Ventricle: The right ventricle is normal in size measuring 3.4 cm. Wall thickness is normal. Systolic function is normal.    Left Atrium: The left atrium is mildly dilated    Aortic Valve: There is mild aortic valve sclerosis.    Mitral Valve: Mildly thickened leaflets. There is mild mitral annular calcification. There is normal leaflet mobility. There is mitral valve prolapse. There is mild to moderate regurgitation.    Tricuspid Valve: There is mild regurgitation.    Pulmonary Artery: The estimated pulmonary artery systolic pressure is 32 mmHg.    IVC/SVC: Normal venous pressure at 3 mmHg.    No results found for this or any previous visit.        Results for orders placed or performed during the hospital encounter of 07/16/25   SCHEDULED EKG 12-LEAD (to Muse)    Collection Time: 07/16/25 12:32 PM   Result Value Ref Range    QRS Duration 102 ms    [facility] QTC Calculation 455 ms    Narrative    Test Reason : E78.5,    Vent. Rate :  59 BPM     Atrial Rate :  59 BPM     P-R Int : 220 ms          QRS Dur : 102 ms      QT Int : 460 ms       P-R-T Axes :  27  22  40 degrees    QTcB Int : 455 ms    Sinus bradycardia with 1st degree A-V block  Otherwise normal ECG  When compared with ECG of 27-Jun-2025 13:01,  Premature atrial complexes are no longer Present  MD interval has increased    Referred By: KATARZYNA ENAMORADO           Confirmed By:          Review of Systems   Constitutional: Negative for malaise/fatigue.   HENT:  Negative for hearing loss and hoarse voice.    Eyes:  Negative for blurred vision and visual disturbance.   Cardiovascular:  Negative for chest pain, claudication, dyspnea on exertion, irregular heartbeat, leg swelling, near-syncope, orthopnea, palpitations, paroxysmal nocturnal dyspnea and syncope.  "  Respiratory:  Positive for sleep disturbances due to breathing and snoring. Negative for cough, hemoptysis, shortness of breath and wheezing.    Endocrine: Negative for cold intolerance and heat intolerance.   Hematologic/Lymphatic: Does not bruise/bleed easily.   Skin:  Negative for color change, dry skin and nail changes.   Musculoskeletal:  Positive for arthritis. Negative for back pain, joint pain and myalgias.   Gastrointestinal:  Negative for bloating, abdominal pain, constipation, nausea and vomiting.   Genitourinary:  Negative for dysuria, flank pain, hematuria and hesitancy.   Neurological:  Negative for headaches, light-headedness, loss of balance, numbness, paresthesias and weakness.   Psychiatric/Behavioral:  Negative for altered mental status.    Allergic/Immunologic: Negative for environmental allergies.         Objective:BP (!) 144/80 (BP Location: Left arm, Patient Position: Sitting)   Pulse (!) 52   Ht 5' 10" (1.778 m)   Wt 88 kg (194 lb 0.1 oz)   SpO2 98%   BMI 27.84 kg/m²      Physical Exam  Vitals and nursing note reviewed.   Constitutional:       General: He is not in acute distress.     Appearance: Normal appearance. He is well-developed. He is not ill-appearing.   HENT:      Head: Normocephalic and atraumatic.      Nose: Nose normal.      Mouth/Throat:      Mouth: Mucous membranes are moist.   Eyes:      Pupils: Pupils are equal, round, and reactive to light.   Neck:      Thyroid: No thyromegaly.      Vascular: No JVD.      Trachea: No tracheal deviation.   Cardiovascular:      Rate and Rhythm: Regular rhythm. Bradycardia present.      Chest Wall: PMI is not displaced.      Pulses: Intact distal pulses.           Radial pulses are 2+ on the right side and 2+ on the left side.        Dorsalis pedis pulses are 2+ on the right side and 2+ on the left side.      Heart sounds: S1 normal and S2 normal. Heart sounds not distant. No murmur heard.  Pulmonary:      Effort: Pulmonary effort is " normal. No respiratory distress.      Breath sounds: Normal breath sounds. No wheezing.   Abdominal:      General: Bowel sounds are normal.      Palpations: Abdomen is soft.   Musculoskeletal:         General: No swelling. Normal range of motion.      Cervical back: Full passive range of motion without pain, normal range of motion and neck supple.      Right ankle: No swelling.      Left ankle: No swelling.   Skin:     General: Skin is warm and dry.      Capillary Refill: Capillary refill takes less than 2 seconds.      Nails: There is no clubbing.   Neurological:      General: No focal deficit present.      Mental Status: He is alert and oriented to person, place, and time.   Psychiatric:         Speech: Speech normal.         Behavior: Behavior normal.         Thought Content: Thought content normal.         Judgment: Judgment normal.         Lab Results   Component Value Date    CHOL 191 07/12/2024    CHOL 185 02/28/2023    CHOL 198 09/09/2021     Lab Results   Component Value Date    HDL 77 (H) 07/12/2024    HDL 80 (H) 02/28/2023    HDL 80 (H) 09/09/2021     Lab Results   Component Value Date    LDLCALC 98.4 07/12/2024    LDLCALC 92.6 02/28/2023    LDLCALC 106.6 09/09/2021     Lab Results   Component Value Date    TRIG 78 07/12/2024    TRIG 62 02/28/2023    TRIG 57 09/09/2021     Lab Results   Component Value Date    CHOLHDL 40.3 07/12/2024    CHOLHDL 43.2 02/28/2023    CHOLHDL 40.4 09/09/2021       Chemistry        Component Value Date/Time     07/12/2024 0839    K 4.1 07/12/2024 0839     07/12/2024 0839    CO2 26 07/12/2024 0839    BUN 10 07/12/2024 0839    CREATININE 1.0 07/12/2024 0839    GLU 81 07/12/2024 0839        Component Value Date/Time    CALCIUM 9.4 07/12/2024 0839    ALKPHOS 56 07/12/2024 0839    AST 30 07/12/2024 0839    ALT 25 07/12/2024 0839    BILITOT 0.8 07/12/2024 0839    ESTGFRAFRICA >60.0 09/09/2021 1648    EGFRNONAA >60.0 09/09/2021 1648          Lab Results   Component Value  "Date    TSH 1.674 07/12/2024     No results found for: "INR", "PROTIME"  Lab Results   Component Value Date    WBC 6.12 07/12/2024    HGB 15.3 07/12/2024    HCT 48.0 07/12/2024    MCV 92 07/12/2024     07/12/2024        Assessment:      1. Skipped beats    2. Bradycardia    3. Hypertensive heart disease without heart failure    4. Sleep apnea, unspecified type        Plan:   Needs 7 day Extended holter for further eval of bradycardia  ETT for chronotropic incompetency given bradycardia issues  Recommend to stop Aricept given concern for bradycardia inducing medication  Home sleep study to rule out SYLVIA   Encourage oral rehydration with electrolytes post runs or walks given excessive sweating.     RTC after tests to discuss further steps if any are needed at that time    Nicole May, FNP-C [facility]       [1]   Social History  Tobacco Use    Smoking status: Never    Smokeless tobacco: Never   Substance Use Topics    Alcohol use: Yes     Alcohol/week: 13.0 standard drinks of alcohol     Types: 13 Glasses of wine per week    Drug use: No   [2]   Current Outpatient Medications on File Prior to Visit   Medication Sig Dispense Refill    donepeziL (ARICEPT) 10 MG tablet Take 10 mg by mouth every evening.      EScitalopram oxalate (LEXAPRO) 5 MG Tab Take 1 tablet (5 mg total) by mouth once daily. 90 tablet 3    QUEtiapine (SEROQUEL) 50 MG tablet Take 1 tablet (50 mg total) by mouth every evening. 30 tablet 6    rosuvastatin (CRESTOR) 5 MG tablet TAKE 1 TABLET EVERY DAY (NEED MD APPOINTMENT) 90 tablet 3     No current facility-administered medications on file prior to visit.     "

## 2025-07-17 LAB
OHS QRS DURATION: 102 MS
OHS QTC CALCULATION: 455 MS

## 2025-07-20 ENCOUNTER — TELEPHONE (OUTPATIENT)
Dept: SLEEP MEDICINE | Facility: CLINIC | Age: 80
End: 2025-07-20
Payer: MEDICARE

## 2025-07-20 ENCOUNTER — PATIENT MESSAGE (OUTPATIENT)
Dept: NEUROLOGY | Facility: CLINIC | Age: 80
End: 2025-07-20
Payer: MEDICARE

## 2025-07-21 ENCOUNTER — OFFICE VISIT (OUTPATIENT)
Dept: FAMILY MEDICINE | Facility: CLINIC | Age: 80
End: 2025-07-21
Payer: MEDICARE

## 2025-07-21 ENCOUNTER — LAB VISIT (OUTPATIENT)
Dept: LAB | Facility: HOSPITAL | Age: 80
End: 2025-07-21
Attending: FAMILY MEDICINE
Payer: MEDICARE

## 2025-07-21 VITALS
OXYGEN SATURATION: 96 % | DIASTOLIC BLOOD PRESSURE: 72 MMHG | BODY MASS INDEX: 27.84 KG/M2 | HEART RATE: 82 BPM | SYSTOLIC BLOOD PRESSURE: 134 MMHG | WEIGHT: 194.44 LBS | TEMPERATURE: 97 F | HEIGHT: 70 IN

## 2025-07-21 DIAGNOSIS — Z00.00 PREVENTATIVE HEALTH CARE: Primary | ICD-10-CM

## 2025-07-21 DIAGNOSIS — I77.9 CAROTID ARTERY DISEASE, UNSPECIFIED LATERALITY, UNSPECIFIED TYPE: ICD-10-CM

## 2025-07-21 DIAGNOSIS — E78.49 OTHER HYPERLIPIDEMIA: Chronic | ICD-10-CM

## 2025-07-21 DIAGNOSIS — G30.1 MILD LATE ONSET ALZHEIMER'S DEMENTIA WITH AGITATION: Chronic | ICD-10-CM

## 2025-07-21 DIAGNOSIS — E78.49 OTHER HYPERLIPIDEMIA: ICD-10-CM

## 2025-07-21 DIAGNOSIS — F02.A11 MILD LATE ONSET ALZHEIMER'S DEMENTIA WITH AGITATION: Chronic | ICD-10-CM

## 2025-07-21 DIAGNOSIS — R00.1 BRADYCARDIA: ICD-10-CM

## 2025-07-21 LAB
ABSOLUTE EOSINOPHIL (OHS): 0.42 K/UL
ABSOLUTE MONOCYTE (OHS): 0.74 K/UL (ref 0.3–1)
ABSOLUTE NEUTROPHIL COUNT (OHS): 2.94 K/UL (ref 1.8–7.7)
ALBUMIN SERPL BCP-MCNC: 4 G/DL (ref 3.5–5.2)
ALP SERPL-CCNC: 61 UNIT/L (ref 40–150)
ALT SERPL W/O P-5'-P-CCNC: 22 UNIT/L (ref 10–44)
ANION GAP (OHS): 9 MMOL/L (ref 8–16)
AST SERPL-CCNC: 23 UNIT/L (ref 11–45)
BASOPHILS # BLD AUTO: 0.02 K/UL
BASOPHILS NFR BLD AUTO: 0.3 %
BILIRUB SERPL-MCNC: 0.6 MG/DL (ref 0.1–1)
BUN SERPL-MCNC: 8 MG/DL (ref 8–23)
CALCIUM SERPL-MCNC: 9 MG/DL (ref 8.7–10.5)
CHLORIDE SERPL-SCNC: 103 MMOL/L (ref 95–110)
CHOLEST SERPL-MCNC: 190 MG/DL (ref 120–199)
CHOLEST/HDLC SERPL: 3.2 {RATIO} (ref 2–5)
CO2 SERPL-SCNC: 25 MMOL/L (ref 23–29)
CREAT SERPL-MCNC: 1 MG/DL (ref 0.5–1.4)
ERYTHROCYTE [DISTWIDTH] IN BLOOD BY AUTOMATED COUNT: 14.5 % (ref 11.5–14.5)
GFR SERPLBLD CREATININE-BSD FMLA CKD-EPI: >60 ML/MIN/1.73/M2
GLUCOSE SERPL-MCNC: 88 MG/DL (ref 70–110)
HCT VFR BLD AUTO: 44.3 % (ref 40–54)
HDLC SERPL-MCNC: 60 MG/DL (ref 40–75)
HDLC SERPL: 31.6 % (ref 20–50)
HGB BLD-MCNC: 14.9 GM/DL (ref 14–18)
IMM GRANULOCYTES # BLD AUTO: 0.02 K/UL (ref 0–0.04)
IMM GRANULOCYTES NFR BLD AUTO: 0.3 % (ref 0–0.5)
LDLC SERPL CALC-MCNC: 90.6 MG/DL (ref 63–159)
LYMPHOCYTES # BLD AUTO: 1.87 K/UL (ref 1–4.8)
MCH RBC QN AUTO: 29.6 PG (ref 27–31)
MCHC RBC AUTO-ENTMCNC: 33.6 G/DL (ref 32–36)
MCV RBC AUTO: 88 FL (ref 82–98)
NONHDLC SERPL-MCNC: 130 MG/DL
NUCLEATED RBC (/100WBC) (OHS): 0 /100 WBC
PLATELET # BLD AUTO: 195 K/UL (ref 150–450)
PMV BLD AUTO: 10.5 FL (ref 9.2–12.9)
POTASSIUM SERPL-SCNC: 3.8 MMOL/L (ref 3.5–5.1)
PROT SERPL-MCNC: 6.8 GM/DL (ref 6–8.4)
RBC # BLD AUTO: 5.03 M/UL (ref 4.6–6.2)
RELATIVE EOSINOPHIL (OHS): 7 %
RELATIVE LYMPHOCYTE (OHS): 31.1 % (ref 18–48)
RELATIVE MONOCYTE (OHS): 12.3 % (ref 4–15)
RELATIVE NEUTROPHIL (OHS): 49 % (ref 38–73)
SODIUM SERPL-SCNC: 137 MMOL/L (ref 136–145)
TRIGL SERPL-MCNC: 197 MG/DL (ref 30–150)
TSH SERPL-ACNC: 1.37 UIU/ML (ref 0.4–4)
WBC # BLD AUTO: 6.01 K/UL (ref 3.9–12.7)

## 2025-07-21 PROCEDURE — 1126F AMNT PAIN NOTED NONE PRSNT: CPT | Mod: CPTII,S$GLB,, | Performed by: FAMILY MEDICINE

## 2025-07-21 PROCEDURE — 84443 ASSAY THYROID STIM HORMONE: CPT

## 2025-07-21 PROCEDURE — 1159F MED LIST DOCD IN RCRD: CPT | Mod: CPTII,S$GLB,, | Performed by: FAMILY MEDICINE

## 2025-07-21 PROCEDURE — 80053 COMPREHEN METABOLIC PANEL: CPT

## 2025-07-21 PROCEDURE — 85025 COMPLETE CBC W/AUTO DIFF WBC: CPT

## 2025-07-21 PROCEDURE — 3075F SYST BP GE 130 - 139MM HG: CPT | Mod: CPTII,S$GLB,, | Performed by: FAMILY MEDICINE

## 2025-07-21 PROCEDURE — 99999 PR PBB SHADOW E&M-EST. PATIENT-LVL IV: CPT | Mod: PBBFAC,,, | Performed by: FAMILY MEDICINE

## 2025-07-21 PROCEDURE — 80061 LIPID PANEL: CPT

## 2025-07-21 PROCEDURE — 36415 COLL VENOUS BLD VENIPUNCTURE: CPT | Mod: PO

## 2025-07-21 PROCEDURE — 99397 PER PM REEVAL EST PAT 65+ YR: CPT | Mod: S$GLB,,, | Performed by: FAMILY MEDICINE

## 2025-07-21 PROCEDURE — 3078F DIAST BP <80 MM HG: CPT | Mod: CPTII,S$GLB,, | Performed by: FAMILY MEDICINE

## 2025-07-21 RX ORDER — OXYBUTYNIN CHLORIDE 5 MG/1
5 TABLET, EXTENDED RELEASE ORAL DAILY
Start: 2025-07-21 | End: 2026-07-21

## 2025-07-21 NOTE — PROGRESS NOTES
CHIEF COMPLAINT:  This is a 79-year-old male here for preventive health exam.       SUBJECTIVE:  Patient is doing well without complaints.  He is being worked up for bradycardia by cardiologist. He was diagnosed with mild Alzheimer's disease by his neurologist and is taking Aricept 10 mg daily day and Seroquel 50 mg nightly  (for behavioral changes). He has hyperlipidemia for which he takes takes Crestor 5 mg daily.  He has a history of mild carotid artery disease and arrhythmia. The patient is followed by urologist for urinary urgency and incontinence for which he takes oxybutynin XL.  He has tinnitus of both ears, especially at night and a past history of shingles.  The patient exercises 2-3 times weekly by going to the gym and running. He has had several falls while running a marathon in January 2025. Patient is a vegetarian but still eats animal products such as eggs and cheese.  Patient drives, cooks and does his own finances.  He lives with his ailing girlfriend whom he cares for.       Eye exam 2021.  Colonoscopy May 2023 due again in May 2028.  Tdap December 2017.  Prevnar June 2019.  Flu November 2024.  COVID 19 vaccine January, February, December 2021, October 2022.     ROS:  GENERAL: Patient denies fever, chills, night sweats.  Patient denies weight gain or loss. Patient denies anorexia, fatigue, weakness or swollen glands.  SKIN: Patient denies rash or hair loss.  HEENT: Patient denies sore throat, ear pain, hearing loss, nasal congestion, or runny nose. Patient denies visual disturbance, eye irritation or discharge.  Positive tinnitus.  LUNGS: Patient denies cough, wheeze or hemoptysis.  CARDIOVASCULAR: Patient denies chest pain, shortness of breath, palpitations, syncope or lower extremity edema.  GI: Patient denies abdominal pain, nausea, vomiting, diarrhea, constipation, blood in stool or melena.  GENITOURINARY: Patient denies irregular vaginal bleeding.  Patient denies dysuria, frequency, hematuria,  nocturia, or urgency.  Positive for incontinence and erectile dysfunction.  MUSCULOSKELETAL: Patient denies joint pain, swelling, redness or warmth.  NEUROLOGIC: Patient denies headache, vertigo, paresthesias, weakness in limb, dysarthria, dysphagia or abnormality of gait.  PSYCHIATRIC: Patient denies anxiety, depression, or memory loss.     OBJECTIVE:   GENERAL:  Well-developed well-nourished pleasant slightly overweight white male alert and oriented x3, in no acute distress.  Mild cognitive impairment.    SKIN: Clear without rash.  Normal color and tone.  HEENT: Eyes: Clear conjunctivae.  No scleral icterus.  Pupils equal reactive to light and accommodation.  Ears: Left ear canal with cerumen impaction.  Right ear canal clear.  Right TM clear.   Nose: Without congestion.  Pharynx: Without injection or exudates.  NECK: Supple, normal range of motion.  No masses, nodes or enlarged thyroid.  No JVD.  Carotids 2+ and equal.  No bruits.  LUNGS: Clear to auscultation.  Normal respiratory effort.  CARDIOVASCULAR: Regular rhythm, normal S1, S2 without murmur, gallop or rub.  BACK: No CVA or spinal tenderness.  ABDOMEN: Normal appearance.  Active bowel sounds.  Soft, nontender without mass or organomegaly.  No rebound or guarding.  EXTREMITIES: Without cyanosis or clubbing.  Trace ankle edema bilaterally.  Distal pulses 2+ and equal.  Normal range of motion in all extremities.  No joint effusion, erythema or warmth.  NEUROLOGIC:   Cranial nerves II through XII without deficit.  Motor strength equal bilaterally.  Sensation normal to touch.  Deep tendon reflexes 2+ and equal.  Gait without abnormality.  No tremor.  Negative cerebellar signs.    TAMERA:  Deferred to urology.    ASSESSMENT:  1. Preventative health care    2. Other hyperlipidemia    3. 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 Carotid artery disease, unspecified laterality, unspecified type    5. Bradycardia      PLAN:  1. Weight reduction.  Exercise regularly.  2. Age-appropriate counseling.  3. Fasting lab.  4. Eye exam.  5. Refill medications as needed.    6. Follow-up in 6-12 months      This note is generated with speech recognition software and is subject to transcription error and sound alike phrases that may be missed by proofreading.

## 2025-07-23 ENCOUNTER — HOSPITAL ENCOUNTER (OUTPATIENT)
Dept: CARDIOLOGY | Facility: HOSPITAL | Age: 80
Discharge: HOME | End: 2025-07-23
Attending: NURSE PRACTITIONER
Payer: MEDICARE

## 2025-07-23 DIAGNOSIS — I11.9 HYPERTENSIVE HEART DISEASE WITHOUT HEART FAILURE: ICD-10-CM

## 2025-07-23 DIAGNOSIS — I45.9 SKIPPED BEATS: ICD-10-CM

## 2025-07-23 DIAGNOSIS — R00.1 BRADYCARDIA: ICD-10-CM

## 2025-07-23 LAB
CV STRESS BASE HR: 56 BPM
DIASTOLIC BLOOD PRESSURE: 95 MMHG
OHS CV CPX 1 MINUTE RECOVERY HEART RATE: 61 BPM
OHS CV CPX 85 PERCENT MAX PREDICTED HEART RATE MALE: 121
OHS CV CPX ESTIMATED METS: 5
OHS CV CPX MAX PREDICTED HEART RATE: 142
OHS CV CPX PATIENT IS FEMALE: 0
OHS CV CPX PATIENT IS MALE: 1
OHS CV CPX PEAK DIASTOLIC BLOOD PRESSURE: 107 MMHG
OHS CV CPX PEAK HEAR RATE: 95 BPM
OHS CV CPX PEAK RATE PRESSURE PRODUCT: NORMAL
OHS CV CPX PEAK SYSTOLIC BLOOD PRESSURE: 204 MMHG
OHS CV CPX PERCENT MAX PREDICTED HEART RATE ACHIEVED: 67
OHS CV CPX RATE PRESSURE PRODUCT PRESENTING: 7616
STRESS ECHO POST EXERCISE DUR MIN: 8 MINUTES
STRESS ECHO POST EXERCISE DUR SEC: 31 SECONDS
SYSTOLIC BLOOD PRESSURE: 136 MMHG

## 2025-07-23 PROCEDURE — 93017 CV STRESS TEST TRACING ONLY: CPT

## 2025-07-23 PROCEDURE — 93016 CV STRESS TEST SUPVJ ONLY: CPT | Mod: ,,, | Performed by: INTERNAL MEDICINE

## 2025-07-23 PROCEDURE — 93018 CV STRESS TEST I&R ONLY: CPT | Mod: ,,, | Performed by: INTERNAL MEDICINE

## 2025-07-24 ENCOUNTER — TELEPHONE (OUTPATIENT)
Dept: CARDIOLOGY | Facility: CLINIC | Age: 80
End: 2025-07-24
Payer: MEDICARE

## 2025-07-24 NOTE — TELEPHONE ENCOUNTER
Please phone patient.      ETT reviewed  HR peak of 95 bpm.   Does show signs of chronotropic incompetency.   Continue regular exercise.  Call if any warning signs such as dizziness, LH, syncope, near syncopal events.   Await Extended holter report. NO changes to medical therapy for time being.     Thanks  DRU Isaac    Called patient to advise of results, spoke to pt verbalized understanding

## 2025-07-29 ENCOUNTER — TELEPHONE (OUTPATIENT)
Dept: CARDIOLOGY | Facility: CLINIC | Age: 80
End: 2025-07-29
Payer: MEDICARE

## 2025-07-29 NOTE — TELEPHONE ENCOUNTER
Spoke with patient rescheduled appt.  Copied from CRM #[id number]. Topic: General Inquiry - Return Call  >> Jul 29, 2025  8:55 AM Gisella wrote:  Type:  Patient Returning Call    Who Called: Jose Soto Jr.   Who Left Message for Patient: Unknown  Does the patient know what this is regarding?:R/s appt.  Would the patient rather a call back or a response via [facility]? Call back  Best Call Back Number:[phone]  Additional Information: Missed call

## 2025-08-05 ENCOUNTER — PATIENT MESSAGE (OUTPATIENT)
Dept: FAMILY MEDICINE | Facility: CLINIC | Age: 80
End: 2025-08-05
Payer: MEDICARE

## 2025-08-06 ENCOUNTER — TELEPHONE (OUTPATIENT)
Dept: CARDIOLOGY | Facility: CLINIC | Age: 80
End: 2025-08-06
Payer: MEDICARE

## 2025-08-11 ENCOUNTER — OFFICE VISIT (OUTPATIENT)
Dept: NEUROLOGY | Facility: CLINIC | Age: 80
End: 2025-08-11
Payer: MEDICARE

## 2025-08-11 VITALS
SYSTOLIC BLOOD PRESSURE: 132 MMHG | WEIGHT: 194.69 LBS | DIASTOLIC BLOOD PRESSURE: 78 MMHG | HEIGHT: 70 IN | HEART RATE: 46 BPM | BODY MASS INDEX: 27.87 KG/M2

## 2025-08-11 DIAGNOSIS — I65.23 CAROTID STENOSIS, BILATERAL: ICD-10-CM

## 2025-08-11 DIAGNOSIS — F02.A11 MILD LATE ONSET ALZHEIMER'S DEMENTIA WITH AGITATION: Primary | Chronic | ICD-10-CM

## 2025-08-11 DIAGNOSIS — G30.1 MILD LATE ONSET ALZHEIMER'S DEMENTIA WITH AGITATION: Primary | Chronic | ICD-10-CM

## 2025-08-11 PROCEDURE — 3078F DIAST BP <80 MM HG: CPT | Mod: CPTII,S$GLB,, | Performed by: PSYCHIATRY & NEUROLOGY

## 2025-08-11 PROCEDURE — 3075F SYST BP GE 130 - 139MM HG: CPT | Mod: CPTII,S$GLB,, | Performed by: PSYCHIATRY & NEUROLOGY

## 2025-08-11 PROCEDURE — 99214 OFFICE O/P EST MOD 30 MIN: CPT | Mod: S$GLB,,, | Performed by: PSYCHIATRY & NEUROLOGY

## 2025-08-11 PROCEDURE — 99999 PR PBB SHADOW E&M-EST. PATIENT-LVL III: CPT | Mod: PBBFAC,,, | Performed by: PSYCHIATRY & NEUROLOGY

## 2025-08-11 PROCEDURE — 1160F RVW MEDS BY RX/DR IN RCRD: CPT | Mod: CPTII,S$GLB,, | Performed by: PSYCHIATRY & NEUROLOGY

## 2025-08-11 PROCEDURE — 1159F MED LIST DOCD IN RCRD: CPT | Mod: CPTII,S$GLB,, | Performed by: PSYCHIATRY & NEUROLOGY

## 2025-08-11 RX ORDER — MEMANTINE HYDROCHLORIDE 5 MG/1
5 TABLET ORAL 2 TIMES DAILY
Qty: 60 TABLET | Refills: 3 | Status: SHIPPED | OUTPATIENT
Start: 2025-08-11 | End: 2025-08-12 | Stop reason: SDUPTHER

## 2025-08-11 RX ORDER — ESCITALOPRAM OXALATE 10 MG/1
10 TABLET ORAL DAILY
Qty: 90 TABLET | Refills: 3 | Status: SHIPPED | OUTPATIENT
Start: 2025-08-11 | End: 2026-08-11

## 2025-08-11 RX ORDER — QUETIAPINE FUMARATE 50 MG/1
50 TABLET, FILM COATED ORAL NIGHTLY
Qty: 30 TABLET | Refills: 6 | Status: SHIPPED | OUTPATIENT
Start: 2025-08-11 | End: 2026-03-09

## 2025-08-11 RX ORDER — DONEPEZIL HYDROCHLORIDE 10 MG/1
10 TABLET, FILM COATED ORAL NIGHTLY
Qty: 90 TABLET | Refills: 3 | Status: SHIPPED | OUTPATIENT
Start: 2025-08-11

## 2025-08-12 ENCOUNTER — PATIENT MESSAGE (OUTPATIENT)
Dept: NEUROLOGY | Facility: CLINIC | Age: 80
End: 2025-08-12
Payer: MEDICARE

## 2025-08-12 DIAGNOSIS — F02.A11 MILD LATE ONSET ALZHEIMER'S DEMENTIA WITH AGITATION: ICD-10-CM

## 2025-08-12 DIAGNOSIS — G30.1 MILD LATE ONSET ALZHEIMER'S DEMENTIA WITH AGITATION: ICD-10-CM

## 2025-08-12 RX ORDER — MEMANTINE HYDROCHLORIDE 5 MG/1
5 TABLET ORAL 2 TIMES DAILY
Qty: 60 TABLET | Refills: 3 | Status: SHIPPED | OUTPATIENT
Start: 2025-08-12 | End: 2025-12-10

## 2025-08-22 ENCOUNTER — HOSPITAL ENCOUNTER (OUTPATIENT)
Dept: SLEEP MEDICINE | Facility: HOSPITAL | Age: 80
Discharge: HOME | End: 2025-08-22
Attending: NURSE PRACTITIONER
Payer: MEDICARE

## 2025-08-22 DIAGNOSIS — G47.30 SLEEP APNEA, UNSPECIFIED TYPE: ICD-10-CM

## 2025-08-22 DIAGNOSIS — G47.33 OSA (OBSTRUCTIVE SLEEP APNEA): Primary | ICD-10-CM

## 2025-08-25 ENCOUNTER — PATIENT MESSAGE (OUTPATIENT)
Dept: FAMILY MEDICINE | Facility: CLINIC | Age: 80
End: 2025-08-25
Payer: MEDICARE

## 2025-08-25 ENCOUNTER — HOSPITAL ENCOUNTER (OUTPATIENT)
Dept: CARDIOLOGY | Facility: HOSPITAL | Age: 80
Discharge: HOME | End: 2025-08-25
Attending: PSYCHIATRY & NEUROLOGY
Payer: MEDICARE

## 2025-08-25 ENCOUNTER — OFFICE VISIT (OUTPATIENT)
Dept: CARDIOLOGY | Facility: CLINIC | Age: 80
End: 2025-08-25
Payer: MEDICARE

## 2025-08-25 VITALS
OXYGEN SATURATION: 98 % | DIASTOLIC BLOOD PRESSURE: 80 MMHG | BODY MASS INDEX: 27.71 KG/M2 | HEIGHT: 70 IN | HEART RATE: 47 BPM | SYSTOLIC BLOOD PRESSURE: 138 MMHG | WEIGHT: 193.56 LBS

## 2025-08-25 DIAGNOSIS — I77.9 CAROTID ARTERY DISEASE, UNSPECIFIED LATERALITY, UNSPECIFIED TYPE: ICD-10-CM

## 2025-08-25 DIAGNOSIS — R00.1 BRADYCARDIA: ICD-10-CM

## 2025-08-25 DIAGNOSIS — E78.2 MIXED HYPERLIPIDEMIA: Primary | Chronic | ICD-10-CM

## 2025-08-25 DIAGNOSIS — I65.23 CAROTID STENOSIS, BILATERAL: ICD-10-CM

## 2025-08-25 PROCEDURE — 1159F MED LIST DOCD IN RCRD: CPT | Mod: CPTII,S$GLB,,

## 2025-08-25 PROCEDURE — 93880 EXTRACRANIAL BILAT STUDY: CPT

## 2025-08-25 PROCEDURE — 99999 PR PBB SHADOW E&M-EST. PATIENT-LVL III: CPT | Mod: PBBFAC,,,

## 2025-08-25 PROCEDURE — 99214 OFFICE O/P EST MOD 30 MIN: CPT | Mod: S$GLB,,,

## 2025-08-25 PROCEDURE — 3075F SYST BP GE 130 - 139MM HG: CPT | Mod: CPTII,S$GLB,,

## 2025-08-25 PROCEDURE — 93880 EXTRACRANIAL BILAT STUDY: CPT | Mod: 26,,, | Performed by: INTERNAL MEDICINE

## 2025-08-25 PROCEDURE — 1101F PT FALLS ASSESS-DOCD LE1/YR: CPT | Mod: CPTII,S$GLB,,

## 2025-08-25 PROCEDURE — 95800 SLP STDY UNATTENDED: CPT | Mod: 26,,, | Performed by: INTERNAL MEDICINE

## 2025-08-25 PROCEDURE — 1160F RVW MEDS BY RX/DR IN RCRD: CPT | Mod: CPTII,S$GLB,,

## 2025-08-25 PROCEDURE — 3288F FALL RISK ASSESSMENT DOCD: CPT | Mod: CPTII,S$GLB,,

## 2025-08-25 PROCEDURE — 3079F DIAST BP 80-89 MM HG: CPT | Mod: CPTII,S$GLB,,

## 2025-08-26 LAB
LEFT ARM DIASTOLIC BLOOD PRESSURE: 90 MMHG
LEFT ARM SYSTOLIC BLOOD PRESSURE: 166 MMHG
LEFT CBA DIAS: 20 CM/S
LEFT CBA SYS: 111 CM/S
LEFT CCA DIST DIAS: 17 CM/S
LEFT CCA DIST SYS: 85 CM/S
LEFT CCA MID DIAS: 17 CM/S
LEFT CCA MID SYS: 107 CM/S
LEFT CCA PROX DIAS: 21 CM/S
LEFT CCA PROX SYS: 125 CM/S
LEFT ECA DIAS: 14 CM/S
LEFT ECA SYS: 98 CM/S
LEFT ICA DIST DIAS: 25 CM/S
LEFT ICA DIST SYS: 101 CM/S
LEFT ICA MID DIAS: 29 CM/S
LEFT ICA MID SYS: 86 CM/S
LEFT ICA PROX DIAS: 13 CM/S
LEFT ICA PROX SYS: 43 CM/S
LEFT VERTEBRAL DIAS: 9 CM/S
LEFT VERTEBRAL SYS: 35 CM/S
OHS CV CAROTID RIGHT ICA EDV HIGHEST: 29
OHS CV CAROTID ULTRASOUND LEFT ICA/CCA RATIO: 1.19
OHS CV CAROTID ULTRASOUND RIGHT ICA/CCA RATIO: 1.19
OHS CV CPX PATIENT HEIGHT IN: 70
OHS CV PV CAROTID LEFT HIGHEST CCA: 125
OHS CV PV CAROTID LEFT HIGHEST ICA: 101
OHS CV PV CAROTID RIGHT HIGHEST CCA: 122
OHS CV PV CAROTID RIGHT HIGHEST ICA: 100
OHS CV US CAROTID LEFT HIGHEST EDV: 29
RIGHT ARM DIASTOLIC BLOOD PRESSURE: 91 MMHG
RIGHT ARM SYSTOLIC BLOOD PRESSURE: 164 MMHG
RIGHT CBA DIAS: 19 CM/S
RIGHT CBA SYS: 86 CM/S
RIGHT CCA DIST DIAS: 17 CM/S
RIGHT CCA DIST SYS: 84 CM/S
RIGHT CCA MID DIAS: 15 CM/S
RIGHT CCA MID SYS: 85 CM/S
RIGHT CCA PROX DIAS: 17 CM/S
RIGHT CCA PROX SYS: 122 CM/S
RIGHT ECA DIAS: 21 CM/S
RIGHT ECA SYS: 113 CM/S
RIGHT ICA DIST DIAS: 23 CM/S
RIGHT ICA DIST SYS: 96 CM/S
RIGHT ICA MID DIAS: 29 CM/S
RIGHT ICA MID SYS: 100 CM/S
RIGHT ICA PROX DIAS: 14 CM/S
RIGHT ICA PROX SYS: 73 CM/S
RIGHT VERTEBRAL DIAS: 19 CM/S
RIGHT VERTEBRAL SYS: 61 CM/S

## 2025-08-27 ENCOUNTER — TELEPHONE (OUTPATIENT)
Dept: PULMONOLOGY | Facility: CLINIC | Age: 80
End: 2025-08-27
Payer: MEDICARE

## 2025-08-27 PROBLEM — G47.30 SLEEP APNEA: Status: ACTIVE | Noted: 2025-08-27

## 2025-08-29 ENCOUNTER — OFFICE VISIT (OUTPATIENT)
Dept: PULMONOLOGY | Facility: CLINIC | Age: 80
End: 2025-08-29
Payer: MEDICARE

## 2025-08-29 VITALS
HEART RATE: 53 BPM | HEIGHT: 70 IN | DIASTOLIC BLOOD PRESSURE: 72 MMHG | BODY MASS INDEX: 27.9 KG/M2 | SYSTOLIC BLOOD PRESSURE: 124 MMHG | OXYGEN SATURATION: 97 % | WEIGHT: 194.88 LBS | RESPIRATION RATE: 13 BRPM

## 2025-08-29 DIAGNOSIS — G47.30 SLEEP APNEA, UNSPECIFIED TYPE: Primary | ICD-10-CM

## 2025-08-29 DIAGNOSIS — R00.1 BRADYCARDIA: ICD-10-CM

## 2025-08-29 DIAGNOSIS — G47.33 OSA (OBSTRUCTIVE SLEEP APNEA): ICD-10-CM

## 2025-08-29 PROCEDURE — 99999 PR PBB SHADOW E&M-EST. PATIENT-LVL IV: CPT | Mod: PBBFAC,,,

## 2025-09-03 ENCOUNTER — TELEPHONE (OUTPATIENT)
Dept: PULMONOLOGY | Facility: CLINIC | Age: 80
End: 2025-09-03
Payer: MEDICARE

## (undated) DEVICE — TAPE SILK 3IN

## (undated) DEVICE — SUT VICRYL 3-0 27 SH

## (undated) DEVICE — SEAL UNIVERSAL 5MM-8MM XI

## (undated) DEVICE — COVER TIP CURVED SCISSORS XI

## (undated) DEVICE — UNDERGLOVE BIOGEL PI SZ 6.5 LF

## (undated) DEVICE — DRAPE ARM DAVINCI XI

## (undated) DEVICE — SUPPORT ULNA NERVE PROTECTOR

## (undated) DEVICE — NDL PNEUMO INSUFFLATI 120MM

## (undated) DEVICE — TRAY CATH FOL SIL URIMTR 16FR

## (undated) DEVICE — MANIFOLD 4 PORT

## (undated) DEVICE — SET PNEUMOCLEAR HEAT HUM SE HF

## (undated) DEVICE — GOWN POLY REINF BRTH SLV XL

## (undated) DEVICE — SUT CTD VICRYL VIL BR UR-6

## (undated) DEVICE — GLOVE SURGICAL LATEX SZ 6.5

## (undated) DEVICE — SOL NS 1000CC

## (undated) DEVICE — ADHESIVE DERMABOND ADVANCED

## (undated) DEVICE — APPLICATOR CHLORAPREP ORN 26ML

## (undated) DEVICE — TOWEL OR DISP STRL BLUE 4/PK

## (undated) DEVICE — DRAPE COLUMN DAVINCI XI

## (undated) DEVICE — HEADREST ROUND DISP FOAM 9IN

## (undated) DEVICE — DRAPE THREE-QTR REINF 53X77IN

## (undated) DEVICE — SYR 10CC LUER LOCK

## (undated) DEVICE — DECANTER 6 VIAL

## (undated) DEVICE — SUT MONOCRYL 4.0 PS2 CP496G

## (undated) DEVICE — TOWEL OR NONABSORB ADH 17X26

## (undated) DEVICE — CLIPPER BLADE MOD 4406 (CAREF)

## (undated) DEVICE — DRAPE ABDOMINAL TIBURON 14X11

## (undated) DEVICE — NDL SAFETY 22G X 1.5 ECLIPSE

## (undated) DEVICE — OBTURATOR BLADELESS 8MM XI CLR

## (undated) DEVICE — PAD PINK TRENDELENBURG POS XL

## (undated) DEVICE — PACK BASIC SETUP SC BR

## (undated) DEVICE — CONTAINER SPECIMEN OR STER 4OZ

## (undated) DEVICE — COVER LIGHT HANDLE 80/CA

## (undated) DEVICE — ELECTRODE REM PLYHSV RETURN 9

## (undated) DEVICE — SOL 9P NACL IRR PIC IL

## (undated) DEVICE — KIT ANTIFOG W/SPONG & FLUID

## (undated) DEVICE — SUT MCRYL PLUS 4-0 PS2 27IN